# Patient Record
Sex: MALE | Race: WHITE | NOT HISPANIC OR LATINO | Employment: OTHER | URBAN - METROPOLITAN AREA
[De-identification: names, ages, dates, MRNs, and addresses within clinical notes are randomized per-mention and may not be internally consistent; named-entity substitution may affect disease eponyms.]

---

## 2017-06-11 ENCOUNTER — APPOINTMENT (OUTPATIENT)
Dept: URGENT CARE | Facility: CLINIC | Age: 57
End: 2017-06-11
Payer: COMMERCIAL

## 2017-06-11 ENCOUNTER — TRANSCRIBE ORDERS (OUTPATIENT)
Dept: URGENT CARE | Facility: CLINIC | Age: 57
End: 2017-06-11

## 2017-06-11 ENCOUNTER — OFFICE VISIT (OUTPATIENT)
Dept: URGENT CARE | Facility: CLINIC | Age: 57
End: 2017-06-11
Attending: EMERGENCY MEDICINE
Payer: COMMERCIAL

## 2017-06-11 ENCOUNTER — OFFICE VISIT (OUTPATIENT)
Dept: URGENT CARE | Facility: CLINIC | Age: 57
End: 2017-06-11
Payer: COMMERCIAL

## 2017-06-11 DIAGNOSIS — R07.9 CHEST PAIN, UNSPECIFIED: Primary | ICD-10-CM

## 2017-06-11 DIAGNOSIS — R07.9 CHEST PAIN, UNSPECIFIED: ICD-10-CM

## 2017-06-11 LAB — GLUCOSE SERPL-MCNC: 87 MG/DL (ref 65–140)

## 2017-06-11 PROCEDURE — 82948 REAGENT STRIP/BLOOD GLUCOSE: CPT

## 2017-06-11 PROCEDURE — 93005 ELECTROCARDIOGRAM TRACING: CPT

## 2017-06-11 PROCEDURE — 99213 OFFICE O/P EST LOW 20 MIN: CPT

## 2017-06-12 LAB
ATRIAL RATE: 166 BPM
QRS AXIS: 19 DEGREES
QRSD INTERVAL: 94 MS
QT INTERVAL: 294 MS
QTC INTERVAL: 465 MS
T WAVE AXIS: 75 DEGREES
VENTRICULAR RATE: 151 BPM

## 2017-11-12 ENCOUNTER — OFFICE VISIT (OUTPATIENT)
Dept: URGENT CARE | Facility: CLINIC | Age: 57
End: 2017-11-12
Payer: COMMERCIAL

## 2017-11-12 PROCEDURE — 99213 OFFICE O/P EST LOW 20 MIN: CPT
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
## 2018-09-10 PROBLEM — E78.5 DYSLIPIDEMIA: Status: ACTIVE | Noted: 2018-09-10

## 2018-09-10 PROBLEM — G47.30 SLEEP APNEA: Status: ACTIVE | Noted: 2018-09-10

## 2018-09-10 PROBLEM — E78.2 MIXED HYPERLIPIDEMIA: Status: ACTIVE | Noted: 2018-09-10

## 2018-09-10 PROBLEM — I25.10 CORONARY ARTERIOSCLEROSIS IN NATIVE ARTERY: Status: ACTIVE | Noted: 2018-09-10

## 2018-09-10 PROBLEM — I48.91 ATRIAL FIBRILLATION (HCC): Status: ACTIVE | Noted: 2018-09-10

## 2018-09-10 PROBLEM — I10 ESSENTIAL (PRIMARY) HYPERTENSION: Status: ACTIVE | Noted: 2018-09-10

## 2018-09-10 PROBLEM — F32.A DEPRESSION: Status: ACTIVE | Noted: 2018-09-10

## 2018-09-10 PROBLEM — F41.9 ANXIETY: Status: ACTIVE | Noted: 2018-09-10

## 2018-09-10 PROBLEM — E66.9 OBESITY: Status: ACTIVE | Noted: 2018-09-10

## 2018-09-10 PROBLEM — R00.2 PALPITATIONS: Status: ACTIVE | Noted: 2018-09-10

## 2018-09-10 RX ORDER — FLUOXETINE HYDROCHLORIDE 20 MG/1
40 CAPSULE ORAL DAILY
COMMUNITY

## 2018-09-10 RX ORDER — ALPRAZOLAM 1 MG/1
1 TABLET ORAL
COMMUNITY

## 2018-09-10 RX ORDER — ASPIRIN 325 MG
325 TABLET ORAL DAILY
COMMUNITY
End: 2019-10-08 | Stop reason: HOSPADM

## 2018-09-10 RX ORDER — BUSPIRONE HYDROCHLORIDE 5 MG/1
TABLET ORAL
COMMUNITY
End: 2018-10-08

## 2018-10-08 ENCOUNTER — OFFICE VISIT (OUTPATIENT)
Dept: CARDIOLOGY CLINIC | Facility: CLINIC | Age: 58
End: 2018-10-08
Payer: COMMERCIAL

## 2018-10-08 VITALS
DIASTOLIC BLOOD PRESSURE: 76 MMHG | WEIGHT: 315 LBS | HEART RATE: 68 BPM | HEIGHT: 68 IN | BODY MASS INDEX: 47.74 KG/M2 | SYSTOLIC BLOOD PRESSURE: 130 MMHG

## 2018-10-08 DIAGNOSIS — I10 ESSENTIAL (PRIMARY) HYPERTENSION: ICD-10-CM

## 2018-10-08 DIAGNOSIS — I48.0 PAROXYSMAL ATRIAL FIBRILLATION (HCC): Primary | ICD-10-CM

## 2018-10-08 PROCEDURE — 99214 OFFICE O/P EST MOD 30 MIN: CPT | Performed by: INTERNAL MEDICINE

## 2018-10-08 RX ORDER — METOPROLOL SUCCINATE 50 MG/1
50 TABLET, EXTENDED RELEASE ORAL DAILY
Qty: 30 TABLET | Refills: 5
Start: 2018-10-08 | End: 2018-12-10 | Stop reason: SDUPTHER

## 2018-10-08 NOTE — PROGRESS NOTES
Subjective:        Patient ID: Antonette Mendoza is a 62 y o  male  Chief Complaint:  Michelle Harmon offers no specific cardiac complaints today  No unusual dyspnea with exertion no change in his mild lower extremity edema which is worse at night better by mornings, no claudication like symptoms, no chest pains with or without exertion  Sounds of like he is having some right-sided sciatica, told to bring this to your attention  Spent a lot of time reviewing his records  He has no reminiscent AFib symptoms post ablation  He has no bradycardic symptoms  He is on CPAP  He remains on aspirin therapy  He is on low-dose long-acting beta-blocker therapy  For the record, despite with the after visit summary says I did not tell him to stop any medications today whatsoever, I simply deleted medications he was not taking  Epic states then this is me telling him to stop meds  This is not the case  The following portions of the patient's history were reviewed and updated as appropriate: allergies, current medications, past family history, past medical history, past social history, past surgical history and problem list   Review of Systems   Constitution: Negative for chills, diaphoresis, malaise/fatigue and weight gain  HENT: Negative for nosebleeds and stridor  Eyes: Negative for double vision, vision loss in left eye, vision loss in right eye and visual disturbance  Cardiovascular: Positive for dyspnea on exertion (Mild nonprogressive) and leg swelling ( chronic non progressive)  Negative for chest pain, claudication, cyanosis, irregular heartbeat, near-syncope, orthopnea, palpitations, paroxysmal nocturnal dyspnea and syncope  Respiratory: Negative for cough, shortness of breath, snoring and wheezing  Endocrine: Negative for polydipsia, polyphagia and polyuria  Hematologic/Lymphatic: Negative for bleeding problem  Does not bruise/bleed easily  Skin: Negative for flushing and rash     Musculoskeletal: Negative for falls and myalgias  Gastrointestinal: Negative for abdominal pain, heartburn, hematemesis, hematochezia, melena and nausea  Genitourinary: Negative for hematuria  Neurological: Negative for brief paralysis, dizziness, focal weakness, headaches, light-headedness, loss of balance and vertigo  Psychiatric/Behavioral: Negative for altered mental status and substance abuse  Allergic/Immunologic: Negative for hives  Objective:      /76   Pulse 68   Ht 5' 8" (1 727 m)   Wt (!) 153 kg (338 lb)   BMI 51 39 kg/m²   Physical Exam   Constitutional: He is oriented to person, place, and time  He appears well-developed and well-nourished  No distress  HENT:   Head: Normocephalic and atraumatic  Eyes: Pupils are equal, round, and reactive to light  EOM are normal  No scleral icterus  Neck: Normal range of motion  Neck supple  No JVD present  No thyromegaly present  Cardiovascular: Normal rate, regular rhythm and normal heart sounds  Exam reveals no gallop and no friction rub  No murmur heard  Pulmonary/Chest: Effort normal and breath sounds normal  No stridor  No respiratory distress  He has no wheezes  He has no rales  Abdominal: Soft  Bowel sounds are normal  He exhibits no distension and no mass  There is no tenderness  Musculoskeletal: Normal range of motion  He exhibits edema (Mild bilateral lower extremity below-knee)  He exhibits no deformity  Neurological: He is alert and oriented to person, place, and time  Coordination normal    Skin: Skin is warm and dry  No erythema  No pallor  Psychiatric: He has a normal mood and affect  His behavior is normal        Lab Review:   No visits with results within 2 Month(s) from this visit     Latest known visit with results is:   Office Visit on 06/11/2017   Component Date Value    Ventricular Rate 06/11/2017 151     Atrial Rate 06/11/2017 166     QRSD Interval 06/11/2017 94     QT Interval 06/11/2017 294     QTC Interval 06/11/2017 465     QRS Axis 06/11/2017 19     T Wave Axis 06/11/2017 75      No results found  Assessment:       1  Paroxysmal atrial fibrillation (HCC)  metoprolol succinate (TOPROL-XL) 50 mg 24 hr tablet   2  Essential (primary) hypertension          Plan:       Alcides Plascencia is without signs or symptoms reminiscent of angina, heart failure, nor electrical instability/recurrent AFib  He remains on aspirin for stroke prevention in light of previously deemed low stroke risk score  Blood pressure is well controlled  I advise no changes today and no surveillance testing  I will see him back in 6 months, sooner as needed

## 2018-10-08 NOTE — PATIENT INSTRUCTIONS

## 2018-12-10 DIAGNOSIS — I48.0 PAROXYSMAL ATRIAL FIBRILLATION (HCC): ICD-10-CM

## 2018-12-10 RX ORDER — METOPROLOL SUCCINATE 50 MG/1
50 TABLET, EXTENDED RELEASE ORAL DAILY
Qty: 90 TABLET | Refills: 3 | Status: SHIPPED | OUTPATIENT
Start: 2018-12-10 | End: 2018-12-10

## 2018-12-10 RX ORDER — METOPROLOL SUCCINATE 50 MG/1
TABLET, EXTENDED RELEASE ORAL
Qty: 30 TABLET | Refills: 0 | Status: SHIPPED | OUTPATIENT
Start: 2018-12-10 | End: 2018-12-18 | Stop reason: SDUPTHER

## 2018-12-18 ENCOUNTER — CONSULT (OUTPATIENT)
Dept: VASCULAR SURGERY | Facility: CLINIC | Age: 58
End: 2018-12-18
Payer: COMMERCIAL

## 2018-12-18 VITALS
SYSTOLIC BLOOD PRESSURE: 100 MMHG | HEART RATE: 60 BPM | DIASTOLIC BLOOD PRESSURE: 60 MMHG | WEIGHT: 315 LBS | BODY MASS INDEX: 47.74 KG/M2 | HEIGHT: 68 IN | TEMPERATURE: 95.8 F

## 2018-12-18 DIAGNOSIS — I87.2 CHRONIC VENOUS INSUFFICIENCY: Primary | ICD-10-CM

## 2018-12-18 DIAGNOSIS — I48.0 PAROXYSMAL ATRIAL FIBRILLATION (HCC): ICD-10-CM

## 2018-12-18 PROCEDURE — 99243 OFF/OP CNSLTJ NEW/EST LOW 30: CPT | Performed by: SURGERY

## 2018-12-18 RX ORDER — BUSPIRONE HYDROCHLORIDE 5 MG/1
TABLET ORAL
COMMUNITY

## 2018-12-18 RX ORDER — METOPROLOL SUCCINATE 50 MG/1
50 TABLET, EXTENDED RELEASE ORAL DAILY
Qty: 30 TABLET | Refills: 5 | Status: SHIPPED | OUTPATIENT
Start: 2018-12-18 | End: 2019-06-28 | Stop reason: SDUPTHER

## 2018-12-18 NOTE — PATIENT INSTRUCTIONS
Patient presents with chronic venous insufficiency hyperpigmentation changes left lower extremity  Also has symptomatic varicosities left greater than right with heaviness and aching after long day standing  Plan:  Obtain reflux study for further evaluation and return to the office for recommendations

## 2018-12-18 NOTE — PROGRESS NOTES
Assessment/Plan:    Chronic venous insufficiency  Patient presents with chronic venous insufficiency hyperpigmentation changes left lower extremity  Also has symptomatic varicosities left greater than right with heaviness and aching after long day standing  Plan:  Obtain reflux study for further evaluation and return to the office for recommendations  Diagnoses and all orders for this visit:    Chronic venous insufficiency  -     VAS reflux lower limb venous duplex study with reflux assessment, complete bilateral; Future    Other orders  -     busPIRone (BUSPAR) 5 mg tablet; take 2 tablets in the morning and 1 tablet at night        Subjective:      Patient ID: Baljit Bryan is a 62 y o  male  HPI heaviness and aching after long day standing, hyperpigmentation changes along the left medial malleolar region indicative of chronic venous insufficiency as well as varicosities proximal to this area  The following portions of the patient's history were reviewed and updated as appropriate: allergies, current medications, past family history, past medical history, past social history, past surgical history and problem list     Review of Systems  bilateral heaviness and aching with venous insufficiency, all other systems negative    Objective:      /60 (BP Location: Left arm, Patient Position: Sitting)   Pulse 60   Temp (!) 95 8 °F (35 4 °C)   Ht 5' 8" (1 727 m)   Wt (!) 156 kg (344 lb)   BMI 52 31 kg/m²          Physical Exam        Oriented x3 no evidence of clinical depression  Neck is supple carotid pulses equal bilaterally no bruits heard    Chest lungs clear, heart regular rhythm  Abdomen soft nontender no evidence of pulsatile masses  Pulses are palpable bilateral femoral popliteal and dorsalis pedis pulses bilaterally  Hyperpigmentation changes seen in the left medial malleolar region upon standing large varicosities are seen clustering above that area    Bilateral modest swelling  Paucity of other varicosity seen bilaterally  Neurological exam intact cranial nerves 2-12 grossly intact no gross motor sensory deficits detected    Vitals:    12/18/18 1011   BP: 100/60   BP Location: Left arm   Patient Position: Sitting   Pulse: 60   Temp: (!) 95 8 °F (35 4 °C)   Weight: (!) 156 kg (344 lb)   Height: 5' 8" (1 727 m)       Patient Active Problem List   Diagnosis    Osteoarthritis of knees, bilateral    Coronary arteriosclerosis in native artery    Sleep apnea    Obesity    Mixed hyperlipidemia    Atrial fibrillation (HCC)    Palpitations    Anxiety    Depression    Essential (primary) hypertension    Dyslipidemia    Chronic venous insufficiency       Past Surgical History:   Procedure Laterality Date    ABLATION RADIO FREQUENCY ATRIAL (MAZE/CRYOABLATION)  02/08/2018    Cryoablation lesion summary, 10 applications were delivered   CARDIAC CATHETERIZATION  11/2012    No sig CAD  6/26/17    COLONOSCOPY      [place] TOTAL KNEE ARTHROPLASTY Bilateral 6/1/2016    Procedure: ARTHROPLASTY KNEE TOTAL BILATERAL;  Surgeon: Lucia Bobby MD;  Location: [facility];  Service: Orthopedics       Family History   Problem Relation Age of Onset    Coronary artery disease Family         less than 61 yrs of age   [facility] Heart attack Mother         MI    Heart attack Sister         MI    Heart attack Brother         MI       Social History     Social History    Marital status: Unknown     Spouse name: N/A    Number of children: N/A    Years of education: N/A     Occupational History    Not on file       Social History Main Topics    Smoking status: Former Smoker     Packs/day: 1 00     Years: 18 00     Types: Cigarettes    Smokeless tobacco: Never Used      Comment: Quit 21 years ago    Alcohol use Yes    Drug use: No    Sexual activity: Not on file     Other Topics Concern    Not on file     Social History Narrative    No narrative on file       Allergies   Allergen Reactions    Benadryl [Diphenhydramine] Other (See Comments)     Tachycardia         Current Outpatient Prescriptions:     ALPRAZolam (XANAX) 1 mg tablet, Take 1 mg by mouth daily at bedtime, Disp: , Rfl:     aspirin 325 mg tablet, Take 325 mg by mouth daily, Disp: , Rfl:     busPIRone (BUSPAR) 5 mg tablet, take 2 tablets in the morning and 1 tablet at night, Disp: , Rfl:     FLUoxetine (PROzac) 20 mg capsule, Take 40 mg by mouth daily In the morning, Disp: , Rfl:     metoprolol succinate (TOPROL-XL) 50 mg 24 hr tablet, TAKE ONE TABLET DAILY   , Disp: 30 tablet, Rfl: 0    zolpidem (AMBIEN) 5 mg tablet, Take 5 mg by mouth daily at bedtime as needed for sleep , Disp: , Rfl:     escitalopram (LEXAPRO) 10 mg tablet, Take 10 mg by mouth daily at bedtime  , Disp: , Rfl:     LORazepam (ATIVAN) 0 5 mg tablet, Take 0 5 mg by mouth as needed for anxiety  , Disp: , Rfl:

## 2018-12-31 ENCOUNTER — HOSPITAL ENCOUNTER (OUTPATIENT)
Dept: ULTRASOUND IMAGING | Facility: HOSPITAL | Age: 58
Discharge: HOME | End: 2018-12-31
Attending: SURGERY
Payer: COMMERCIAL

## 2018-12-31 DIAGNOSIS — I87.2 CHRONIC VENOUS INSUFFICIENCY: ICD-10-CM

## 2018-12-31 PROCEDURE — 93970 EXTREMITY STUDY: CPT

## 2019-01-01 PROCEDURE — 93970 EXTREMITY STUDY: CPT | Performed by: SURGERY

## 2019-01-17 ENCOUNTER — OFFICE VISIT (OUTPATIENT)
Dept: URGENT CARE | Facility: CLINIC | Age: 59
End: 2019-01-17
Payer: COMMERCIAL

## 2019-01-17 VITALS
DIASTOLIC BLOOD PRESSURE: 72 MMHG | HEIGHT: 68 IN | HEART RATE: 74 BPM | BODY MASS INDEX: 47.74 KG/M2 | TEMPERATURE: 98 F | WEIGHT: 315 LBS | OXYGEN SATURATION: 97 % | RESPIRATION RATE: 32 BRPM | SYSTOLIC BLOOD PRESSURE: 140 MMHG

## 2019-01-17 DIAGNOSIS — R05.9 COUGH: Primary | ICD-10-CM

## 2019-01-17 PROCEDURE — 99213 OFFICE O/P EST LOW 20 MIN: CPT | Performed by: PHYSICIAN ASSISTANT

## 2019-01-17 RX ORDER — ALBUTEROL SULFATE 90 UG/1
2 AEROSOL, METERED RESPIRATORY (INHALATION) EVERY 6 HOURS PRN
Qty: 18 G | Refills: 0 | Status: SHIPPED | OUTPATIENT
Start: 2019-01-17 | End: 2020-02-10

## 2019-01-17 RX ORDER — BENZONATATE 100 MG/1
100 CAPSULE ORAL 3 TIMES DAILY PRN
Qty: 30 CAPSULE | Refills: 0 | Status: SHIPPED | OUTPATIENT
Start: 2019-01-17 | End: 2019-02-21 | Stop reason: ALTCHOICE

## 2019-01-17 RX ORDER — AZITHROMYCIN 250 MG/1
TABLET, FILM COATED ORAL
Qty: 6 TABLET | Refills: 0 | Status: SHIPPED | OUTPATIENT
Start: 2019-01-17 | End: 2019-01-21

## 2019-01-17 NOTE — PROGRESS NOTES
[facility]        NAME: Millie Skelton is a 62 y o  male  : 1960    MRN: [id number]  DATE: 2019  TIME: 1:34 PM    Assessment and Plan   Cough [R05]  1  Cough  azithromycin (ZITHROMAX Z-CESARIO) 250 mg tablet    benzonatate (TESSALON PERLES) 100 mg capsule    albuterol (VENTOLIN HFA) 90 mcg/act inhaler         Patient Instructions   Due to cough x2+ weeks with worsening of sx's, recommend abx treatment at this time and pt agrees  Will rx azithromycin  Tesselon perles and ventolin inhaler for symptomatic relief of cough/dyspnea  Increase fluids and rest     Follow up with PCP in 3-5 days  Proceed to  ER if symptoms worsen  Chief Complaint     Chief Complaint   Patient presents with    Cold Like Symptoms     C/O harsh cough ,sinus pressure, headache,post nasal drip, tight in the chest and SOB for more than 2 weeks  Pt thought symptoms were improving but worsened yesterday  Pt did not have the flu vaccine  History of Present Illness       Pt presents for eval of cough onset 2-3 weeks ago with associated dyspnea, chest tightness, sinus pain/pressure, PND  Pt states sx's were improving but then worsened yesterday  Has tried taking delsym with mild improvement  Pt notes his wife is sick at home with similar sx's  Review of Systems   Review of Systems   Constitutional: Negative for chills, fatigue and fever  HENT: Positive for congestion, postnasal drip and sinus pressure  Negative for ear pain, rhinorrhea and sore throat  Eyes: Negative for discharge and redness  Respiratory: Positive for cough and shortness of breath  Negative for wheezing  Cardiovascular: Negative for chest pain and palpitations  Gastrointestinal: Negative for abdominal pain, diarrhea, nausea and vomiting  Musculoskeletal: Negative for myalgias  Skin: Negative for rash  Neurological: Negative for dizziness and headaches           Current Medications       Current Outpatient Prescriptions:    ALPRAZolam (XANAX) 1 mg tablet, Take 1 mg by mouth daily at bedtime, Disp: , Rfl:     aspirin 325 mg tablet, Take 325 mg by mouth daily, Disp: , Rfl:     busPIRone (BUSPAR) 5 mg tablet, take 2 tablets in the morning and 1 tablet at night, Disp: , Rfl:     FLUoxetine (PROzac) 20 mg capsule, Take 40 mg by mouth daily In the morning, Disp: , Rfl:     metoprolol succinate (TOPROL-XL) 50 mg 24 hr tablet, Take 1 tablet (50 mg total) by mouth daily, Disp: 30 tablet, Rfl: 5    zolpidem (AMBIEN) 5 mg tablet, Take 5 mg by mouth daily at bedtime as needed for sleep , Disp: , Rfl:     albuterol (VENTOLIN HFA) 90 mcg/act inhaler, Inhale 2 puffs every 6 (six) hours as needed for wheezing, Disp: 18 g, Rfl: 0    azithromycin (ZITHROMAX Z-CESARIO) 250 mg tablet, Take 2 tabs PO on the first day, then 1 tab PO daily thereafter, Disp: 6 tablet, Rfl: 0    benzonatate (TESSALON PERLES) 100 mg capsule, Take 1 capsule (100 mg total) by mouth 3 (three) times a day as needed for cough, Disp: 30 capsule, Rfl: 0    Current Allergies     Allergies as of 01/17/2019 - Reviewed 01/17/2019   Allergen Reaction Noted    Benadryl [diphenhydramine] Other (See Comments) 05/03/2016            The following portions of the patient's history were reviewed and updated as appropriate: allergies, current medications, past family history, past medical history, past social history, past surgical history and problem list      Past Medical History:   Diagnosis Date    Anxiety     Arthritis     Athscl heart disease of native coronary artery w/o ang pctrs     Atrial fibrillation ([address] ) 2012 and 2016    CPAP (continuous positive airway pressure) dependence     Diverticulitis     Edema     Legs and hands    Essential tremor     Hard of hearing     Hx of cardiovascular stress test 04/28/2016    EF0 50 (50%) normal LVSF  No evidence for prior ischemia or MI    Hx of echocardiogram 05/18/2016    EF0 55 (55%) severely technically limited suboptimal study  No significant valvular insufficiency or stenosis   / EF0 60 (60%) Trace MR and TR 6/22/17  / EF0 65 (65%) technically difficult study  no hemodynamically significant valve disease identified  11/30/17      Hypercholesteremia     Hypertension     Mixed hyperlipidemia     Morbid obesity ([address] )     Obesity     Panic attacks     Rash     On extremeties and chest  Chronic, recurrent    Sleep apnea     Varicose veins of left lower extremity     Bilateral       Past Surgical History:   Procedure Laterality Date    ABLATION RADIO FREQUENCY ATRIAL (MAZE/CRYOABLATION)  02/08/2018    Cryoablation lesion summary, 10 applications were delivered   CARDIAC CATHETERIZATION  11/2012    No sig CAD  6/26/17    COLONOSCOPY      [place] TOTAL KNEE ARTHROPLASTY Bilateral 6/1/2016    Procedure: ARTHROPLASTY KNEE TOTAL BILATERAL;  Surgeon: Karrie Beebe MD;  Location: [facility];  Service: Orthopedics       Family History   Problem Relation Age of Onset    Coronary artery disease Family         less than 61 yrs of age   [facility] Heart attack Mother         MI    Heart attack Sister         MI    Heart attack Brother         MI         Medications have been verified  Objective   /72   Pulse 74   Temp 98 °F (36 7 °C) (Tympanic)   Resp (!) 32   Ht 5' 8" (1 727 m)   Wt (!) 156 kg (344 lb)   SpO2 97%   BMI 52 31 kg/m²        Physical Exam     Physical Exam   Constitutional: He is oriented to person, place, and time  He appears well-developed and well-nourished  HENT:   Head: Normocephalic  Right Ear: Tympanic membrane, external ear and ear canal normal    Left Ear: Tympanic membrane, external ear and ear canal normal    Nose: Nose normal    Mouth/Throat: Uvula is midline, oropharynx is clear and moist and mucous membranes are normal    Eyes: Pupils are equal, round, and reactive to light  Conjunctivae and EOM are normal    Cardiovascular: Normal rate, regular rhythm and normal heart sounds      Pulmonary/Chest: Effort normal and breath sounds normal    Lymphadenopathy:     He has no cervical adenopathy  Neurological: He is alert and oriented to person, place, and time  Skin: Skin is warm and dry  No rash noted  Psychiatric: He has a normal mood and affect  Nursing note and vitals reviewed

## 2019-01-21 ENCOUNTER — OFFICE VISIT (OUTPATIENT)
Dept: URGENT CARE | Facility: CLINIC | Age: 59
End: 2019-01-21
Payer: COMMERCIAL

## 2019-01-21 VITALS
WEIGHT: 315 LBS | TEMPERATURE: 98.7 F | BODY MASS INDEX: 47.74 KG/M2 | OXYGEN SATURATION: 97 % | RESPIRATION RATE: 24 BRPM | HEIGHT: 68 IN | HEART RATE: 88 BPM | DIASTOLIC BLOOD PRESSURE: 80 MMHG | SYSTOLIC BLOOD PRESSURE: 126 MMHG

## 2019-01-21 DIAGNOSIS — J20.8 ACUTE BACTERIAL BRONCHITIS: Primary | ICD-10-CM

## 2019-01-21 DIAGNOSIS — B96.89 ACUTE BACTERIAL BRONCHITIS: Primary | ICD-10-CM

## 2019-01-21 PROCEDURE — 99213 OFFICE O/P EST LOW 20 MIN: CPT | Performed by: FAMILY MEDICINE

## 2019-01-21 RX ORDER — GUAIFENESIN AND CODEINE PHOSPHATE 100; 10 MG/5ML; MG/5ML
5 SOLUTION ORAL 3 TIMES DAILY PRN
Qty: 120 ML | Refills: 0 | Status: SHIPPED | OUTPATIENT
Start: 2019-01-21 | End: 2019-02-21 | Stop reason: ALTCHOICE

## 2019-01-21 RX ORDER — AMOXICILLIN AND CLAVULANATE POTASSIUM 875; 125 MG/1; MG/1
1 TABLET, FILM COATED ORAL EVERY 12 HOURS SCHEDULED
COMMUNITY
End: 2019-02-21 | Stop reason: ALTCHOICE

## 2019-01-21 NOTE — PATIENT INSTRUCTIONS
Plain Mucinex, plain Robitussin, or plain guaifenesin for congestion and cough during the day  Prescription cough medicine with codeine at bedtime  You may use the a m  your is often as every 3 or 4 hr  If worsening of symptoms, recommended evaluation in the emergency room  Follow up with PCP in 3-5 days  Proceed to  ER if symptoms worsen  Acute Bronchitis   AMBULATORY CARE:   Acute bronchitis  is swelling and irritation in the air passages of your lungs  This irritation may cause you to cough or have other breathing problems  Acute bronchitis often starts because of another illness, such as a cold or the flu  The illness spreads from your nose and throat to your windpipe and airways  Bronchitis is often called a chest cold  Acute bronchitis lasts about 3 to 6 weeks and is usually not a serious illness  Your cough can last for several weeks  You may have any of the following symptoms:   · A cough with sputum that may be clear, yellow, or green    · Feeling more tired than usual, and body aches    · A fever and chills    · Wheezing when you breathe    · A tight chest or pain when you breathe or cough  Seek care immediately if:   · You cough up blood  · Your lips or fingernails turn blue  · You feel like you are not getting enough air when you breathe  Contact your healthcare provider if:   · You have a fever  · Your breathing problems do not go away or get worse  · Your cough does not get better within 4 weeks  · You have questions or concerns about your condition or care  Self-care:   · Get more rest   Rest helps your body to heal  Slowly start to do more each day  Rest when you feel it is needed  · Avoid irritants in the air  Avoid chemicals, fumes, and dust  Wear a face mask if you must work around dust or fumes  Stay inside on days when air pollution levels are high  If you have allergies, stay inside when pollen counts are high   Do not use aerosol products, such as spray-on deodorant, bug spray, and hair spray  · Do not smoke or be around others who smoke  Nicotine and other chemicals in cigarettes and cigars damages the cilia that move mucus out of your lungs  Ask your healthcare provider for information if you currently smoke and need help to quit  E-cigarettes or smokeless tobacco still contain nicotine  Talk to your healthcare provider before you use these products  · Drink liquids as directed  Liquids help keep your air passages moist and help you cough up mucus  You may need to drink more liquids when you have acute bronchitis  Ask how much liquid to drink each day and which liquids are best for you  · Use a humidifier or vaporizer  Use a cool mist humidifier or a vaporizer to increase air moisture in your home  This may make it easier for you to breathe and help decrease your cough  Prevent acute bronchitis by doing the following:   · Get the vaccinations you need  Ask your healthcare provider if you should get vaccinated against the flu or pneumonia  · Prevent the spread of germs  You can decrease your risk of acute bronchitis and other illnesses by doing the following:     [facility] your hands often with soap and water  Carry germ-killing hand lotion or gel with you  You can use the lotion or gel to clean your hands when soap and water are not available  ¨ Do not touch your eyes, nose, or mouth unless you have washed your hands first     ¨ Always cover your mouth when you cough to prevent the spread of germs  It is best to cough into a tissue or your shirt sleeve instead of into your hand  Ask those around you cover their mouths when they cough  ¨ Try to avoid people who have a cold or the flu  If you are sick, stay away from others as much as possible  Medicines: Your healthcare provider may  give you any of the following:  · Ibuprofen or acetaminophen  are medicines that help lower your fever  They are available without a doctor's order   Ask your healthcare provider which medicine is right for you  Ask how much to take and how often to take it  Follow directions  These medicines can cause stomach bleeding if not taken correctly  Ibuprofen can cause kidney damage  Do not take ibuprofen if you have kidney disease, an ulcer, or allergies to aspirin  Acetaminophen can cause liver damage  Do not take more than 4,000 milligrams in 24 hours  · Decongestants  help loosen mucus in your lungs and make it easier to cough up  This can help you breathe easier  · Cough suppressants  decrease your urge to cough  If your cough produces mucus, do not take a cough suppressant unless your healthcare provider tells you to  Your healthcare provider may suggest that you take a cough suppressant at night so you can rest     · Inhalers  may be given  Your healthcare provider may give you one or more inhalers to help you breathe easier and cough less  An inhaler gives your medicine to open your airways  Ask your healthcare provider to show you how to use your inhaler correctly  Follow up with your healthcare provider as directed:  Write down questions you have so you will remember to ask them during your follow-up visits  © 2017 [facility] Information is for End User's use only and may not be sold, redistributed or otherwise used for commercial purposes  All illustrations and images included in CareNotes® are the copyrighted property of A D [employer] M [employer]  or Steffen Bowden  The above information is an  only  It is not intended as medical advice for individual conditions or treatments  Talk to your doctor, nurse or pharmacist before following any medical regimen to see if it is safe and effective for you

## 2019-01-21 NOTE — PROGRESS NOTES
[facility]        NAME: Brittaney Wyman is a 62 y o  male  : 1960    MRN: [id number]  DATE: 2019  TIME: 1:41 PM    Assessment and Plan   Acute bacterial bronchitis [J20 8, B96 89]  1  Acute bacterial bronchitis  guaifenesin-codeine (GUAIFENESIN AC) 100-10 MG/5ML liquid     Patient could benefit from prednisone, but refused  Patient had a bad interaction with prednisone in the past that exacerbated atrial fibrillation  Patient has had a cardiac ablation, but still prefers not to try the prednisone again  Patient Instructions     Plain Mucinex, plain Robitussin, or plain guaifenesin for congestion and cough during the day  Prescription cough medicine with codeine at bedtime  You may use the a m  your is often as every 3 or 4 hr  If worsening of symptoms, recommended evaluation in the emergency room  Follow up with PCP in 3-5 days  Proceed to  ER if symptoms worsen  Chief Complaint     Chief Complaint   Patient presents with    Cough     Cough, congestion, wheezing and chills started last week  History of Present Illness       Cough (Cough, congestion, wheezing and chills started last week  )  Patient is taking the azithromycin, Tessalon Perles, and using the inhaler twice a day  Still with a mucousy cough that is keeping him awake at night  Cough   This is a recurrent problem  The current episode started 1 to 4 weeks ago  The problem has been gradually improving  The problem occurs constantly  The cough is productive of sputum  Associated symptoms include chills and wheezing  He has tried a beta-agonist inhaler and OTC cough suppressant for the symptoms  Review of Systems   Review of Systems   Constitutional: Positive for chills and fatigue  HENT: Positive for congestion  Respiratory: Positive for cough and wheezing  Cardiovascular: Negative            Current Medications       Current Outpatient Prescriptions:     albuterol (VENTOLIN HFA) 90 mcg/act inhaler, Inhale 2 puffs every 6 (six) hours as needed for wheezing, Disp: 18 g, Rfl: 0    ALPRAZolam (XANAX) 1 mg tablet, Take 1 mg by mouth daily at bedtime, Disp: , Rfl:     amoxicillin-clavulanate (AUGMENTIN) 875-125 mg per tablet, Take 1 tablet by mouth every 12 (twelve) hours, Disp: , Rfl:     aspirin 325 mg tablet, Take 325 mg by mouth daily, Disp: , Rfl:     benzonatate (TESSALON PERLES) 100 mg capsule, Take 1 capsule (100 mg total) by mouth 3 (three) times a day as needed for cough, Disp: 30 capsule, Rfl: 0    busPIRone (BUSPAR) 5 mg tablet, take 2 tablets in the morning and 1 tablet at night, Disp: , Rfl:     FLUoxetine (PROzac) 20 mg capsule, Take 40 mg by mouth daily In the morning, Disp: , Rfl:     metoprolol succinate (TOPROL-XL) 50 mg 24 hr tablet, Take 1 tablet (50 mg total) by mouth daily, Disp: 30 tablet, Rfl: 5    zolpidem (AMBIEN) 5 mg tablet, Take 5 mg by mouth daily at bedtime as needed for sleep , Disp: , Rfl:     azithromycin (ZITHROMAX Z-CESARIO) 250 mg tablet, Take 2 tabs PO on the first day, then 1 tab PO daily thereafter (Patient not taking: Reported on 1/21/2019 ), Disp: 6 tablet, Rfl: 0    guaifenesin-codeine (GUAIFENESIN AC) 100-10 MG/5ML liquid, Take 5 mL by mouth 3 (three) times a day as needed for cough, Disp: 120 mL, Rfl: 0    Current Allergies     Allergies as of 01/21/2019 - Reviewed 01/21/2019   Allergen Reaction Noted    Benadryl [diphenhydramine] Other (See Comments) 05/03/2016            The following portions of the patient's history were reviewed and updated as appropriate: allergies, current medications, past family history, past medical history, past social history, past surgical history and problem list      Past Medical History:   Diagnosis Date    Anxiety     Arthritis     Athscl heart disease of native coronary artery w/o ang pctrs     Atrial fibrillation ([facility] [address] ) 2012 and 2016    CPAP (continuous positive airway pressure) dependence     Diverticulitis     Edema     Legs and hands    Essential tremor     Hard of hearing     Hx of cardiovascular stress test 04/28/2016    EF0 50 (50%) normal LVSF  No evidence for prior ischemia or MI    Hx of echocardiogram 05/18/2016    EF0 55 (55%) severely technically limited suboptimal study  No significant valvular insufficiency or stenosis   / EF0 60 (60%) Trace MR and TR 6/22/17  / EF0 65 (65%) technically difficult study  no hemodynamically significant valve disease identified  11/30/17      Hypercholesteremia     Hypertension     Mixed hyperlipidemia     Morbid obesity ([address] )     Obesity     Panic attacks     Rash     On extremeties and chest  Chronic, recurrent    Sleep apnea     Varicose veins of left lower extremity     Bilateral       Past Surgical History:   Procedure Laterality Date    ABLATION RADIO FREQUENCY ATRIAL (MAZE/CRYOABLATION)  02/08/2018    Cryoablation lesion summary, 10 applications were delivered   CARDIAC CATHETERIZATION  11/2012    No sig CAD  6/26/17    COLONOSCOPY      MA TOTAL KNEE ARTHROPLASTY Bilateral 6/1/2016    Procedure: ARTHROPLASTY KNEE TOTAL BILATERAL;  Surgeon: Claudio Reid MD;  Location: [facility];  Service: Orthopedics       Family History   Problem Relation Age of Onset    Coronary artery disease Family         less than 61 yrs of age   [facility] Heart attack Mother         MI    Heart attack Sister         MI    Heart attack Brother         MI         Medications have been verified  Objective   /80   Pulse 88   Temp 98 7 °F (37 1 °C) (Tympanic)   Resp (!) 24   Ht 5' 8" (1 727 m)   Wt (!) 152 kg (335 lb)   SpO2 97%   BMI 50 94 kg/m²        Physical Exam     Physical Exam   Constitutional: He appears well-developed  HENT:   Right Ear: External ear normal    Left Ear: External ear normal    Nose: Nose normal    Mouth/Throat: Oropharynx is clear and moist  No oropharyngeal exudate     Eyes: Conjunctivae are normal    Neck: Normal range of motion  Neck supple  Cardiovascular: Normal rate, regular rhythm and normal heart sounds  No murmur heard  Pulmonary/Chest: Effort normal  No respiratory distress  He has wheezes  He has no rales  He exhibits no tenderness  Lymphadenopathy:     He has no cervical adenopathy

## 2019-02-21 ENCOUNTER — APPOINTMENT (EMERGENCY)
Dept: RADIOLOGY | Facility: HOSPITAL | Age: 59
End: 2019-02-21
Payer: COMMERCIAL

## 2019-02-21 ENCOUNTER — APPOINTMENT (EMERGENCY)
Dept: CT IMAGING | Facility: HOSPITAL | Age: 59
End: 2019-02-21
Payer: COMMERCIAL

## 2019-02-21 ENCOUNTER — HOSPITAL ENCOUNTER (EMERGENCY)
Facility: HOSPITAL | Age: 59
Discharge: HOME | End: 2019-02-22
Attending: EMERGENCY MEDICINE
Payer: COMMERCIAL

## 2019-02-21 DIAGNOSIS — N39.0 UTI (URINARY TRACT INFECTION): Primary | ICD-10-CM

## 2019-02-21 LAB
ALBUMIN SERPL BCP-MCNC: 4.1 G/DL (ref 3.5–5.7)
ALP SERPL-CCNC: 52 U/L (ref 40–150)
ALT SERPL W P-5'-P-CCNC: 19 U/L (ref 7–52)
ANION GAP SERPL CALCULATED.3IONS-SCNC: 6 MMOL/L (ref 4–13)
APTT PPP: 32 SECONDS (ref 26–38)
AST SERPL W P-5'-P-CCNC: 12 U/L (ref 13–39)
BACTERIA UR QL AUTO: ABNORMAL /HPF
BASOPHILS # BLD AUTO: 0.2 THOUSANDS/ΜL (ref 0–0.1)
BASOPHILS NFR BLD AUTO: 2 % (ref 0–2)
BILIRUB SERPL-MCNC: 0.8 MG/DL (ref 0.2–1)
BILIRUB UR QL STRIP: NEGATIVE
BNP SERPL-MCNC: 56 PG/ML (ref 1–100)
BUN SERPL-MCNC: 17 MG/DL (ref 7–25)
CALCIUM SERPL-MCNC: 9.6 MG/DL (ref 8.6–10.5)
CHLORIDE SERPL-SCNC: 98 MMOL/L (ref 98–107)
CLARITY UR: CLEAR
CO2 SERPL-SCNC: 27 MMOL/L (ref 21–31)
COLOR UR: YELLOW
CREAT SERPL-MCNC: 1.06 MG/DL (ref 0.7–1.3)
EOSINOPHIL # BLD AUTO: 0.2 THOUSAND/ΜL (ref 0–0.61)
EOSINOPHIL NFR BLD AUTO: 2 % (ref 0–5)
ERYTHROCYTE [DISTWIDTH] IN BLOOD BY AUTOMATED COUNT: 15 % (ref 11.5–14.5)
GFR SERPL CREATININE-BSD FRML MDRD: 77 ML/MIN/1.73SQ M
GLUCOSE SERPL-MCNC: 106 MG/DL (ref 65–99)
GLUCOSE UR STRIP-MCNC: NEGATIVE MG/DL
HCT VFR BLD AUTO: 43.8 % (ref 42–47)
HGB BLD-MCNC: 14.8 G/DL (ref 14–18)
HGB UR QL STRIP.AUTO: ABNORMAL
INR PPP: 1.18 (ref 0.9–1.5)
KETONES UR STRIP-MCNC: ABNORMAL MG/DL
LACTATE SERPL-SCNC: 1 MMOL/L (ref 0.5–2)
LEUKOCYTE ESTERASE UR QL STRIP: ABNORMAL
LYMPHOCYTES # BLD AUTO: 0.5 THOUSANDS/ΜL (ref 0.6–4.47)
LYMPHOCYTES NFR BLD AUTO: 4 % (ref 21–51)
MCH RBC QN AUTO: 28.2 PG (ref 26–34)
MCHC RBC AUTO-ENTMCNC: 33.9 G/DL (ref 31–37)
MCV RBC AUTO: 83 FL (ref 81–99)
MONOCYTES # BLD AUTO: 0.5 THOUSAND/ΜL (ref 0.17–1.22)
MONOCYTES NFR BLD AUTO: 5 % (ref 2–12)
NEUTROPHILS # BLD AUTO: 9.2 THOUSANDS/ΜL (ref 1.4–6.5)
NEUTS SEG NFR BLD AUTO: 88 % (ref 42–75)
NITRITE UR QL STRIP: NEGATIVE
NON-SQ EPI CELLS URNS QL MICRO: ABNORMAL /HPF
NRBC BLD AUTO-RTO: 0 /100 WBCS
PH UR STRIP.AUTO: 7.5 [PH] (ref 5–8)
PLATELET # BLD AUTO: 192 THOUSANDS/UL (ref 149–390)
PMV BLD AUTO: 7.3 FL (ref 8.6–11.7)
POTASSIUM SERPL-SCNC: 4 MMOL/L (ref 3.5–5.5)
PROT SERPL-MCNC: 7.3 G/DL (ref 6.4–8.9)
PROT UR STRIP-MCNC: NEGATIVE MG/DL
PROTHROMBIN TIME: 13.7 SECONDS (ref 10.2–13)
RBC # BLD AUTO: 5.27 MILLION/UL (ref 4.3–5.9)
RBC #/AREA URNS AUTO: ABNORMAL /HPF
SODIUM SERPL-SCNC: 131 MMOL/L (ref 134–143)
SP GR UR STRIP.AUTO: 1.01 (ref 1–1.03)
TROPONIN I SERPL-MCNC: <0.03 NG/ML
UROBILINOGEN UR QL STRIP.AUTO: 1 E.U./DL
WBC # BLD AUTO: 10.5 THOUSAND/UL (ref 4.8–10.8)
WBC #/AREA URNS AUTO: ABNORMAL /HPF

## 2019-02-21 PROCEDURE — 96375 TX/PRO/DX INJ NEW DRUG ADDON: CPT

## 2019-02-21 PROCEDURE — 83880 ASSAY OF NATRIURETIC PEPTIDE: CPT | Performed by: EMERGENCY MEDICINE

## 2019-02-21 PROCEDURE — 36415 COLL VENOUS BLD VENIPUNCTURE: CPT | Performed by: EMERGENCY MEDICINE

## 2019-02-21 PROCEDURE — 71046 X-RAY EXAM CHEST 2 VIEWS: CPT

## 2019-02-21 PROCEDURE — 99284 EMERGENCY DEPT VISIT MOD MDM: CPT

## 2019-02-21 PROCEDURE — 84484 ASSAY OF TROPONIN QUANT: CPT | Performed by: EMERGENCY MEDICINE

## 2019-02-21 PROCEDURE — 81003 URINALYSIS AUTO W/O SCOPE: CPT | Performed by: EMERGENCY MEDICINE

## 2019-02-21 PROCEDURE — 85730 THROMBOPLASTIN TIME PARTIAL: CPT | Performed by: EMERGENCY MEDICINE

## 2019-02-21 PROCEDURE — 74177 CT ABD & PELVIS W/CONTRAST: CPT

## 2019-02-21 PROCEDURE — 85610 PROTHROMBIN TIME: CPT | Performed by: EMERGENCY MEDICINE

## 2019-02-21 PROCEDURE — 87040 BLOOD CULTURE FOR BACTERIA: CPT | Performed by: EMERGENCY MEDICINE

## 2019-02-21 PROCEDURE — 85025 COMPLETE CBC W/AUTO DIFF WBC: CPT | Performed by: EMERGENCY MEDICINE

## 2019-02-21 PROCEDURE — 80053 COMPREHEN METABOLIC PANEL: CPT | Performed by: EMERGENCY MEDICINE

## 2019-02-21 PROCEDURE — 84145 PROCALCITONIN (PCT): CPT | Performed by: EMERGENCY MEDICINE

## 2019-02-21 PROCEDURE — 96361 HYDRATE IV INFUSION ADD-ON: CPT

## 2019-02-21 PROCEDURE — 81001 URINALYSIS AUTO W/SCOPE: CPT | Performed by: EMERGENCY MEDICINE

## 2019-02-21 PROCEDURE — 93005 ELECTROCARDIOGRAM TRACING: CPT

## 2019-02-21 PROCEDURE — 83605 ASSAY OF LACTIC ACID: CPT | Performed by: EMERGENCY MEDICINE

## 2019-02-21 PROCEDURE — 96365 THER/PROPH/DIAG IV INF INIT: CPT

## 2019-02-21 RX ORDER — KETOROLAC TROMETHAMINE 30 MG/ML
15 INJECTION, SOLUTION INTRAMUSCULAR; INTRAVENOUS ONCE
Status: COMPLETED | OUTPATIENT
Start: 2019-02-21 | End: 2019-02-21

## 2019-02-21 RX ORDER — ONDANSETRON 2 MG/ML
4 INJECTION INTRAMUSCULAR; INTRAVENOUS ONCE
Status: COMPLETED | OUTPATIENT
Start: 2019-02-21 | End: 2019-02-21

## 2019-02-21 RX ORDER — ACETAMINOPHEN 325 MG/1
975 TABLET ORAL ONCE
Status: COMPLETED | OUTPATIENT
Start: 2019-02-21 | End: 2019-02-21

## 2019-02-21 RX ORDER — CEFTRIAXONE 2 G/50ML
2000 INJECTION, SOLUTION INTRAVENOUS ONCE
Status: DISCONTINUED | OUTPATIENT
Start: 2019-02-21 | End: 2019-02-21

## 2019-02-21 RX ORDER — 0.9 % SODIUM CHLORIDE 0.9 %
3 VIAL (ML) INJECTION AS NEEDED
Status: DISCONTINUED | OUTPATIENT
Start: 2019-02-21 | End: 2019-02-22 | Stop reason: HOSPADM

## 2019-02-21 RX ORDER — MORPHINE SULFATE 10 MG/ML
6 INJECTION, SOLUTION INTRAMUSCULAR; INTRAVENOUS ONCE
Status: COMPLETED | OUTPATIENT
Start: 2019-02-21 | End: 2019-02-21

## 2019-02-21 RX ADMIN — IOHEXOL 100 ML: 350 INJECTION, SOLUTION INTRAVENOUS at 23:04

## 2019-02-21 RX ADMIN — MORPHINE SULFATE 6 MG: 10 INJECTION, SOLUTION INTRAMUSCULAR; INTRAVENOUS at 22:21

## 2019-02-21 RX ADMIN — ONDANSETRON 4 MG: 2 INJECTION INTRAMUSCULAR; INTRAVENOUS at 22:15

## 2019-02-21 RX ADMIN — KETOROLAC TROMETHAMINE 15 MG: 30 INJECTION, SOLUTION INTRAMUSCULAR; INTRAVENOUS at 23:08

## 2019-02-21 RX ADMIN — SODIUM CHLORIDE 1000 ML: 0.9 INJECTION, SOLUTION INTRAVENOUS at 22:33

## 2019-02-21 RX ADMIN — ACETAMINOPHEN 975 MG: 325 TABLET ORAL at 22:11

## 2019-02-21 RX ADMIN — SODIUM CHLORIDE 1000 ML: 0.9 INJECTION, SOLUTION INTRAVENOUS at 22:15

## 2019-02-21 RX ADMIN — PIPERACILLIN SODIUM AND TAZOBACTAM SODIUM 4.5 G: 4; .5 INJECTION, POWDER, LYOPHILIZED, FOR SOLUTION INTRAVENOUS at 23:09

## 2019-02-22 VITALS
RESPIRATION RATE: 20 BRPM | OXYGEN SATURATION: 94 % | SYSTOLIC BLOOD PRESSURE: 108 MMHG | TEMPERATURE: 99.2 F | BODY MASS INDEX: 47.74 KG/M2 | HEART RATE: 68 BPM | HEIGHT: 68 IN | DIASTOLIC BLOOD PRESSURE: 59 MMHG | WEIGHT: 315 LBS

## 2019-02-22 LAB
ATRIAL RATE: 95 BPM
P AXIS: 50 DEGREES
PR INTERVAL: 136 MS
PROCALCITONIN SERPL-MCNC: 0.07 NG/ML
QRS AXIS: 16 DEGREES
QRSD INTERVAL: 94 MS
QT INTERVAL: 356 MS
QTC INTERVAL: 447 MS
T WAVE AXIS: 48 DEGREES
VENTRICULAR RATE: 95 BPM

## 2019-02-22 PROCEDURE — 96361 HYDRATE IV INFUSION ADD-ON: CPT

## 2019-02-22 PROCEDURE — 93010 ELECTROCARDIOGRAM REPORT: CPT | Performed by: INTERNAL MEDICINE

## 2019-02-22 PROCEDURE — 96376 TX/PRO/DX INJ SAME DRUG ADON: CPT

## 2019-02-22 RX ORDER — CEPHALEXIN 500 MG/1
500 CAPSULE ORAL EVERY 12 HOURS SCHEDULED
Qty: 20 CAPSULE | Refills: 0 | Status: SHIPPED | OUTPATIENT
Start: 2019-02-22 | End: 2019-03-04

## 2019-02-22 RX ORDER — MORPHINE SULFATE 4 MG/ML
4 INJECTION, SOLUTION INTRAMUSCULAR; INTRAVENOUS ONCE
Status: COMPLETED | OUTPATIENT
Start: 2019-02-22 | End: 2019-02-22

## 2019-02-22 RX ADMIN — MORPHINE SULFATE 4 MG: 4 INJECTION INTRAVENOUS at 00:34

## 2019-02-22 RX ADMIN — SODIUM CHLORIDE 1000 ML: 0.9 INJECTION, SOLUTION INTRAVENOUS at 00:34

## 2019-02-22 NOTE — ED PROVIDER NOTES
History  Chief Complaint   Patient presents with    Back Pain     pt dev back and abd pain a few days ago, today C/O fever and chills  80-year-old male with history of AFib, diverticulitis, CAD, hypertension and hyperlipidemia presents for evaluation of abdominal and back pain x3 days  Pain is bilateral back with radiation to the lower abdomen and currently an 8/10  Patient reports no aggravating or relieving factors  Patient reports fevers and chills x1 day with T-max of 101° at home  Patient denies sick contacts, chest pain, shortness of breath, nausea or vomiting  Patient with no history of ureteral stones in the past       History provided by:  Patient   used: No    Back Pain   Associated symptoms: abdominal pain and fever    Associated symptoms: no chest pain, no dysuria and no headaches        Prior to Admission Medications   Prescriptions Last Dose Informant Patient Reported? Taking? ALPRAZolam (XANAX) 1 mg tablet 2/20/2019 at Unknown time  Yes Yes   Sig: Take 1 mg by mouth daily at bedtime   FLUoxetine (PROzac) 20 mg capsule 2/21/2019 at Unknown time  Yes Yes   Sig: Take 40 mg by mouth daily In the morning   albuterol (VENTOLIN HFA) 90 mcg/act inhaler   No No   Sig: Inhale 2 puffs every 6 (six) hours as needed for wheezing   aspirin 325 mg tablet 2/21/2019 at Unknown time  Yes Yes   Sig: Take 325 mg by mouth daily   busPIRone (BUSPAR) 5 mg tablet 2/21/2019 at Unknown time  Yes Yes   Sig: take 2 tablets in the morning and 1 tablet at night   metoprolol succinate (TOPROL-XL) 50 mg 24 hr tablet 2/21/2019 at Unknown time  No Yes   Sig: Take 1 tablet (50 mg total) by mouth daily   zolpidem (AMBIEN) 5 mg tablet 2/20/2019 at Unknown time  Yes Yes   Sig: Take 5 mg by mouth daily at bedtime as needed for sleep        Facility-Administered Medications: None       Past Medical History:   Diagnosis Date    Anxiety     Arthritis     Athscl heart disease of native coronary artery w/o ang pctrs     Atrial fibrillation ([facility] [address] ) 2012 and 2016    CPAP (continuous positive airway pressure) dependence     Diverticulitis     Edema     Legs and hands    Essential tremor     Hard of hearing     Hx of cardiovascular stress test 04/28/2016    EF0 50 (50%) normal LVSF  No evidence for prior ischemia or MI    Hx of echocardiogram 05/18/2016    EF0 55 (55%) severely technically limited suboptimal study  No significant valvular insufficiency or stenosis   / EF0 60 (60%) Trace MR and TR 6/22/17  / EF0 65 (65%) technically difficult study  no hemodynamically significant valve disease identified  11/30/17      Hypercholesteremia     Hypertension     Mixed hyperlipidemia     Morbid obesity ([facility] [address] )     Obesity     Panic attacks     Rash     On extremeties and chest  Chronic, recurrent    Sleep apnea     Varicose veins of left lower extremity     Bilateral       Past Surgical History:   Procedure Laterality Date    ABLATION RADIO FREQUENCY ATRIAL (MAZE/CRYOABLATION)  02/08/2018    Cryoablation lesion summary, 10 applications were delivered   CARDIAC CATHETERIZATION  11/2012    No sig CAD  6/26/17    COLONOSCOPY      [place] TOTAL KNEE ARTHROPLASTY Bilateral 6/1/2016    Procedure: ARTHROPLASTY KNEE TOTAL BILATERAL;  Surgeon: Miguel Myles MD;  Location: [facility];  Service: Orthopedics       Family History   Problem Relation Age of Onset    Coronary artery disease Family         less than 61 yrs of age   [facility] Heart attack Mother         MI    Heart attack Sister         MI    Heart attack Brother         MI     I have reviewed and agree with the history as documented  Social History     Tobacco Use    Smoking status: Former Smoker     Packs/day: 1 00     Years: 18 00     Pack years: 18 00     Types: Cigarettes    Smokeless tobacco: Never Used    Tobacco comment: Quit 21 years ago   Substance Use Topics    Alcohol use:  Yes    Drug use: No        Review of Systems   Constitutional: Positive for chills and fever  HENT: Negative for rhinorrhea and sore throat  Eyes: Negative for visual disturbance  Respiratory: Negative for cough and shortness of breath  Cardiovascular: Negative for chest pain and leg swelling  Gastrointestinal: Positive for abdominal pain  Negative for constipation, diarrhea, nausea and vomiting  Genitourinary: Positive for hematuria  Negative for dysuria  Musculoskeletal: Positive for back pain  Negative for myalgias  Skin: Negative for rash  Neurological: Negative for dizziness and headaches  Psychiatric/Behavioral: Negative for confusion  All other systems reviewed and are negative  Physical Exam  Physical Exam   Constitutional: He is oriented to person, place, and time  He appears well-developed and well-nourished  HENT:   Nose: Nose normal    Mouth/Throat: Oropharynx is clear and moist  No oropharyngeal exudate  Eyes: Pupils are equal, round, and reactive to light  Conjunctivae and EOM are normal  No scleral icterus  Neck: Normal range of motion  Neck supple  No JVD present  No tracheal deviation present  Cardiovascular: Normal rate, regular rhythm and normal heart sounds  No murmur heard  Pulmonary/Chest: Effort normal and breath sounds normal  No respiratory distress  He has no wheezes  He has no rales  Abdominal: Soft  Bowel sounds are normal  There is tenderness in the right lower quadrant and left lower quadrant  There is CVA tenderness (Bilaterally)  There is no guarding, no tenderness at McBurney's point and negative Marroquin's sign  Musculoskeletal: Normal range of motion  He exhibits no edema or tenderness  Neurological: He is alert and oriented to person, place, and time  No cranial nerve deficit or sensory deficit  He exhibits normal muscle tone  5/5 motor, nl sens   Skin: Skin is warm and dry  Psychiatric: He has a normal mood and affect  His behavior is normal    Nursing note and vitals reviewed        Vital Signs  ED Triage Vitals Temperature Pulse Respirations Blood Pressure SpO2   02/21/19 2125 02/21/19 2126 02/21/19 2126 02/21/19 2126 02/21/19 2126   (!) 101 6 °F (38 7 °C) 101 20 146/75 94 %      Temp Source Heart Rate Source Patient Position - Orthostatic VS BP Location FiO2 (%)   02/21/19 2125 02/21/19 2126 02/21/19 2126 02/21/19 2126 --   Temporal Monitor Lying Left arm       Pain Score       02/21/19 2126       7           Vitals:    02/21/19 2130 02/21/19 2230 02/21/19 2327 02/22/19 0000   BP: 146/75 136/73 138/66 110/57   Pulse: 101 93 86 74   Patient Position - Orthostatic VS:   Lying        Visual Acuity      ED Medications  Medications   sodium chloride (PF) 0 9 % injection 3 mL (has no administration in time range)   sodium chloride 0 9 % bolus 1,000 mL (0 mL Intravenous Stopped 2/22/19 0035)     Followed by   sodium chloride 0 9 % bolus 1,000 mL (1,000 mL Intravenous New Bag 2/21/19 2233)     Followed by   sodium chloride 0 9 % bolus 1,000 mL (1,000 mL Intravenous New Bag 2/22/19 0034)   ondansetron (ZOFRAN) injection 4 mg (4 mg Intravenous Given 2/21/19 2215)   morphine (PF) 10 mg/mL injection 6 mg (6 mg Intravenous Given 2/21/19 2221)   acetaminophen (TYLENOL) tablet 975 mg (975 mg Oral Given 2/21/19 2211)   ketorolac (TORADOL) injection 15 mg (15 mg Intravenous Given 2/21/19 2308)   piperacillin-tazobactam (ZOSYN) 4 5 g in sodium chloride 0 9 % 100 mL IVPB (0 g Intravenous Stopped 2/22/19 0001)   iohexol (OMNIPAQUE) 350 MG/ML injection (MULTI-DOSE) 100 mL (100 mL Intravenous Given 2/21/19 2304)   morphine (PF) 4 mg/mL injection 4 mg (4 mg Intravenous Given 2/22/19 0034)       Diagnostic Studies  Results Reviewed     Procedure Component Value Units Date/Time    Troponin I [[id number]]     Lab Status:  No result Specimen:  Blood     Troponin I [[id number]]     Lab Status:  No result Specimen:  Blood     B-Type Natriuretic Peptide [facility] ONLY) [[id number]]  (Normal) Collected:  02/21/19 2157    Lab Status:  Final result Specimen:  Blood from Arm, Right Updated:  02/21/19 2233     BNP 56 pg/mL     Lactic Acid x2 [[id number]]  (Normal) Collected:  02/21/19 2157    Lab Status:  Final result Specimen:  Blood from Arm, Right Updated:  02/21/19 2232     LACTIC ACID 1 0 mmol/L     Narrative:       Result may be elevated if tourniquet was used during collection  Troponin I [[id number]]  (Normal) Collected:  02/21/19 2157    Lab Status:  Final result Specimen:  Blood from Arm, Right Updated:  02/21/19 2232     Troponin I <0 03 ng/mL     Comprehensive metabolic panel [[id number]]  (Abnormal) Collected:  02/21/19 2157    Lab Status:  Final result Specimen:  Blood from Arm, Right Updated:  02/21/19 2232     Sodium 131 mmol/L      Potassium 4 0 mmol/L      Chloride 98 mmol/L      CO2 27 mmol/L      ANION GAP 6 mmol/L      BUN 17 mg/dL      Creatinine 1 06 mg/dL      Glucose 106 mg/dL      Calcium 9 6 mg/dL      AST 12 U/L      ALT 19 U/L      Alkaline Phosphatase 52 U/L      Total Protein 7 3 g/dL      Albumin 4 1 g/dL      Total Bilirubin 0 80 mg/dL      eGFR 77 ml/min/1 73sq m     Narrative:       National Kidney Disease Education Program recommendations are as follows:  GFR calculation is accurate only with a steady state creatinine  Chronic Kidney disease less than 60 ml/min/1 73 sq  meters  Kidney failure less than 15 ml/min/1 73 sq  meters      Protime-INR [[id number]]  (Abnormal) Collected:  02/21/19 2157    Lab Status:  Final result Specimen:  Blood from Arm, Right Updated:  02/21/19 2221     Protime 13 7 seconds      INR 1 18    APTT [[id number]]  (Normal) Collected:  02/21/19 2157    Lab Status:  Final result Specimen:  Blood from Arm, Right Updated:  02/21/19 2221     PTT 32 seconds     CBC and differential [[id number]]  (Abnormal) Collected:  02/21/19 2156    Lab Status:  Final result Specimen:  Blood from Arm, Right Updated:  02/21/19 2211     WBC 10 50 Thousand/uL      RBC 5 27 Million/uL      Hemoglobin 14 8 g/dL Hematocrit 43 8 %      MCV 83 fL      MCH 28 2 pg      MCHC 33 9 g/dL      RDW 15 0 %      MPV 7 3 fL      Platelets 035 Thousands/uL      nRBC 0 /100 WBCs      Neutrophils Relative 88 %      Lymphocytes Relative 4 %      Monocytes Relative 5 %      Eosinophils Relative 2 %      Basophils Relative 2 %      Neutrophils Absolute 9 20 Thousands/µL      Lymphocytes Absolute 0 50 Thousands/µL      Monocytes Absolute 0 50 Thousand/µL      Eosinophils Absolute 0 20 Thousand/µL      Basophils Absolute 0 20 Thousands/µL     Procalcitonin [[id number]] Collected:  02/21/19 2157    Lab Status: In process Specimen:  Blood from Arm, Right Updated:  02/21/19 2207    Blood culture #2 [[id number]] Collected:  02/21/19 2155    Lab Status: In process Specimen:  Blood from Arm, Right Updated:  02/21/19 2206    Blood culture #1 [[id number]] Collected:  02/21/19 2155    Lab Status: In process Specimen:  Blood from Arm, Right Updated:  02/21/19 2206    Urine Microscopic [[id number]]  (Abnormal) Collected:  02/21/19 2146    Lab Status:  Final result Specimen:  Urine, Clean Catch Updated:  02/21/19 2156     RBC, UA 10-20 /hpf      WBC, UA 4-10 /hpf      Epithelial Cells Occasional /hpf      Bacteria, UA Occasional /hpf     UA w Reflex to Microscopic w Reflex to Culture [[id number]]  (Abnormal) Collected:  02/21/19 2146    Lab Status:  Final result Specimen:  Urine, Clean Catch Updated:  02/21/19 2152     Color, UA Yellow     Clarity, UA Clear     Specific Gravity, UA 1 015     pH, UA 7 5     Leukocytes, UA 1+     Nitrite, UA Negative     Protein, UA Negative mg/dl      Glucose, UA Negative mg/dl      Ketones, UA Trace mg/dl      Urobilinogen, UA 1 0 E U /dl      Bilirubin, UA Negative     Blood, UA 2+    Lactic Acid x2 [[id number]]     Lab Status:  No result Specimen:  Blood                  CT abdomen pelvis with contrast   Final Result by Enoch Lindquist MD (02/22 0038)      1    No acute inflammatory process identified within the abdomen or pelvis  2   Diverticulosis without evidence of diverticulitis  3   Enlarged prostate  4   4 mm right lower lobe pulmonary nodule  Based on current Fleischner Society 2017 Guidelines on incidental pulmonary nodule, no routine follow-up is needed if the patient is considered low risk for lung cancer  If the patient is considered high risk    for lung cancer, 12 month follow-up non-contrast chest CT is recommended  The study was marked in EPIC for significant notification  Workstation performed: [id number]         XR chest 2 views    (Results Pending)              Procedures  ECG 12 Lead Documentation  Date/Time: 2/21/2019 10:11 PM  Performed by: Isabella Mendez DO  Authorized by: Isabella Mendez DO     Indications / Diagnosis:  Sepsis  ECG reviewed by me, the ED Provider: yes    Patient location:  ED  Previous ECG:     Previous ECG:  Compared to current    Comparison ECG info:  6/11/16    Similarity:  Changes noted (AFib improved compared to prior)    Comparison to cardiac monitor: Yes    Interpretation:     Interpretation: normal    Rate:     ECG rate:  95 BPM    ECG rate assessment: normal    Rhythm:     Rhythm: sinus rhythm    Ectopy:     Ectopy: none    QRS:     QRS axis:  Normal    QRS intervals:  Normal  Conduction:     Conduction: normal    ST segments:     ST segments:  Normal  T waves:     T waves: normal    Q waves:     Q waves:  III, aVR and V1           Phone Contacts  ED Phone Contact    ED Course  ED Course as of Feb 22 0057   Thu Feb 21, 2019   2130 Patient seen and examined at bedside  Labs, imaging and EKG ordered  NS bolus 30 cc/kg (ideal body weight) morphine 6 mg and Tylenol 975 mg ordered  2239 Labs reviewed, lactic acid 1 troponin negative and BNP 56  No renal dysfunction and no liver dysfunction  No leukocytosis, WBC 10 5  Plan to give dose of Toradol and obtain CT abdomen pelvis  2246 Zosyn 4 5 g ordered and CT abdomen pelvis pending        Fri Feb 22, 2019   0049 Imaging reviewed no acute abnormalities noted  Discussed with patient results of imaging and labs and plan for discharge home  Discussed with patient discharge plan and instructions  Patient to follow up with primary care physician as an outpatient  Patient to return to ED if any change or worsening condition: increased pain, new onset fever or bleeding  MDM    Disposition  Final diagnoses:   UTI (urinary tract infection)     Time reflects when diagnosis was documented in both MDM as applicable and the Disposition within this note     Time User Action Codes Description Comment    2/22/2019 12:49 AM Zenaida Terry Add [N39 0] UTI (urinary tract infection)       ED Disposition     ED Disposition Condition Date/Time Comment    Discharge Stable Fri Feb 22, 2019 12:49 AM Owen Zelaya discharge to home/self care  Follow-up Information     Follow up With Specialties Details Why [address] [facility], Emergency Medicine In 2 days  Jostin Peterson [address] [facility] [place] [facility] [address]  Emergency Department Emergency Medicine  If symptoms worsen, As needed [address] [place] [id number] [phone]          Patient's Medications   Discharge Prescriptions    CEPHALEXIN (KEFLEX) 500 MG CAPSULE    Take 1 capsule (500 mg total) by mouth every 12 (twelve) hours for 10 days       Start Date: 2/22/2019 End Date: 3/4/2019       Order Dose: 500 mg       Quantity: 20 capsule    Refills: 0     No discharge procedures on file      ED Provider  Electronically Signed by           Chelsy Vela DO  02/22/19 8659

## 2019-02-26 ENCOUNTER — OFFICE VISIT (OUTPATIENT)
Dept: VASCULAR SURGERY | Facility: CLINIC | Age: 59
End: 2019-02-26
Payer: COMMERCIAL

## 2019-02-26 VITALS
WEIGHT: 315 LBS | BODY MASS INDEX: 47.74 KG/M2 | HEIGHT: 68 IN | HEART RATE: 60 BPM | DIASTOLIC BLOOD PRESSURE: 70 MMHG | SYSTOLIC BLOOD PRESSURE: 120 MMHG

## 2019-02-26 DIAGNOSIS — I83.893 SYMPTOMATIC VARICOSE VEINS, BILATERAL: Primary | ICD-10-CM

## 2019-02-26 PROCEDURE — 99213 OFFICE O/P EST LOW 20 MIN: CPT | Performed by: SURGERY

## 2019-02-26 NOTE — PROGRESS NOTES
Assessment/Plan:    Symptomatic varicose veins, bilateral  The patient is complaining of heaviness and aching left lower extremity greater than right with superficial varicosities and hyperpigmentation changes  No history of deep vein thrombosis he has had history of superficial thrombophlebitis in the medial aspect of his left calf  Plan:  After review of his reflux study and his symptomatology we have elected to continue to follow him medically with compression stockings elevation and weight loss program   I will see him back in the office in 6 months       Diagnoses and all orders for this visit:    Symptomatic varicose veins, bilateral        Subjective:      Patient ID: August Flowers is a 62 y o  male  HPI history of heaviness and aching left lower extremity greater than right pigmentation changes history of venous laser/phlebectomy surgery in the past   Medical history of atrial fibrillation and ablation  The following portions of the patient's history were reviewed and updated as appropriate: allergies, current medications, past family history, past medical history, past social history, past surgical history and problem list     Review of Systems  venous stasis changes left greater than right lower extremity, obesity, all other systems negative    Objective:      /70   Pulse 60   Ht 5' 8" (1 727 m)   Wt (!) 148 kg (326 lb)   BMI 49 57 kg/m²          Physical Exam        Oriented x3 no evidence of clinical depression  Neck is supple carotid pulses equal bilaterally no bruits heard    Chest lungs clear, heart regular rhythm  Abdomen soft nontender no evidence of pulsatile masses  Pulses are palpable bilateral femoral popliteal dorsalis pedis pulses palpable bilaterally  Upon standing varicosities are seen mostly in the left lower extremity hyperpigmentation changes and mild stasis dermatitis        Neurological exam intact cranial nerves 2-12 grossly intact no gross motor sensory deficits detected  I have reviewed and made appropriate changes to the review of systems input by the medical assistant  Vitals:    02/26/19 1457   BP: 120/70   Pulse: 60   Weight: (!) 148 kg (326 lb)   Height: 5' 8" (1 727 m)       Patient Active Problem List   Diagnosis    Osteoarthritis of knees, bilateral    Coronary arteriosclerosis in native artery    Sleep apnea    Obesity    Mixed hyperlipidemia    Atrial fibrillation (HCC)    Palpitations    Anxiety    Depression    Essential (primary) hypertension    Dyslipidemia    Chronic venous insufficiency    Symptomatic varicose veins, bilateral       Past Surgical History:   Procedure Laterality Date    ABLATION RADIO FREQUENCY ATRIAL (MAZE/CRYOABLATION)  02/08/2018    Cryoablation lesion summary, 10 applications were delivered      CARDIAC CATHETERIZATION  11/2012    No sig CAD  6/26/17    COLONOSCOPY      [place] TOTAL KNEE ARTHROPLASTY Bilateral 6/1/2016    Procedure: ARTHROPLASTY KNEE TOTAL BILATERAL;  Surgeon: Claudio Reid MD;  Location: [facility];  Service: Orthopedics       Family History   Problem Relation Age of Onset    Coronary artery disease Family         less than 61 yrs of age   [facility] Heart attack Mother         MI    Heart attack Sister         MI    Heart attack Brother         MI       Social History     Socioeconomic History    Marital status: Unknown     Spouse name: Not on file    Number of children: Not on file    Years of education: Not on file    Highest education level: Not on file   Occupational History    Not on file   Social Needs    Financial resource strain: Not on file    Food insecurity:     Worry: Not on file     Inability: Not on file    Transportation needs:     Medical: Not on file     Non-medical: Not on file   Tobacco Use    Smoking status: Former Smoker     Packs/day: 1 00     Years: 18 00     Pack years: 18 00     Types: Cigarettes    Smokeless tobacco: Never Used    Tobacco comment: Quit 21 years ago Substance and Sexual Activity    Alcohol use:  Yes    Drug use: No    Sexual activity: Not on file   Lifestyle    Physical activity:     Days per week: Not on file     Minutes per session: Not on file    Stress: Not on file   Relationships    Social connections:     Talks on phone: Not on file     Gets together: Not on file     Attends Gnosticism service: Not on file     Active member of club or organization: Not on file     Attends meetings of clubs or organizations: Not on file     Relationship status: Not on file    Intimate partner violence:     Fear of current or ex partner: Not on file     Emotionally abused: Not on file     Physically abused: Not on file     Forced sexual activity: Not on file   Other Topics Concern    Not on file   Social History Narrative    Not on file       Allergies   Allergen Reactions    Benadryl [Diphenhydramine] Other (See Comments)     Tachycardia         Current Outpatient Medications:     albuterol (VENTOLIN HFA) 90 mcg/act inhaler, Inhale 2 puffs every 6 (six) hours as needed for wheezing, Disp: 18 g, Rfl: 0    ALPRAZolam (XANAX) 1 mg tablet, Take 1 mg by mouth daily at bedtime, Disp: , Rfl:     aspirin 325 mg tablet, Take 325 mg by mouth daily, Disp: , Rfl:     busPIRone (BUSPAR) 5 mg tablet, take 2 tablets in the morning and 1 tablet at night, Disp: , Rfl:     cephalexin (KEFLEX) 500 mg capsule, Take 1 capsule (500 mg total) by mouth every 12 (twelve) hours for 10 days, Disp: 20 capsule, Rfl: 0    FLUoxetine (PROzac) 20 mg capsule, Take 40 mg by mouth daily In the morning, Disp: , Rfl:     metoprolol succinate (TOPROL-XL) 50 mg 24 hr tablet, Take 1 tablet (50 mg total) by mouth daily, Disp: 30 tablet, Rfl: 5    zolpidem (AMBIEN) 5 mg tablet, Take 5 mg by mouth daily at bedtime as needed for sleep , Disp: , Rfl:

## 2019-02-26 NOTE — PATIENT INSTRUCTIONS
The patient is complaining of heaviness and aching left lower extremity greater than right with superficial varicosities and hyperpigmentation changes  No history of deep vein thrombosis he has had history of superficial thrombophlebitis in the medial aspect of his left calf      Plan:  After review of his reflux study and his symptomatology we have elected to continue to follow him medically with compression stockings elevation and weight loss program   I will see him back in the office in 6 months

## 2019-02-27 LAB
BACTERIA BLD CULT: NORMAL
BACTERIA BLD CULT: NORMAL

## 2019-04-24 ENCOUNTER — APPOINTMENT (OUTPATIENT)
Dept: LAB | Facility: MEDICAL CENTER | Age: 59
End: 2019-04-24
Payer: COMMERCIAL

## 2019-04-24 ENCOUNTER — TRANSCRIBE ORDERS (OUTPATIENT)
Dept: LAB | Facility: MEDICAL CENTER | Age: 59
End: 2019-04-24

## 2019-04-24 DIAGNOSIS — Z12.5 SPECIAL SCREENING FOR MALIGNANT NEOPLASM OF PROSTATE: ICD-10-CM

## 2019-04-24 DIAGNOSIS — R25.1 TREMORS OF NERVOUS SYSTEM: ICD-10-CM

## 2019-04-24 DIAGNOSIS — R35.0 URINE FREQUENCY: Primary | ICD-10-CM

## 2019-04-24 DIAGNOSIS — R35.0 URINE FREQUENCY: ICD-10-CM

## 2019-04-24 DIAGNOSIS — Z09 HOSPITAL DISCHARGE FOLLOW-UP: ICD-10-CM

## 2019-04-24 PROCEDURE — 36415 COLL VENOUS BLD VENIPUNCTURE: CPT

## 2019-04-24 PROCEDURE — 82607 VITAMIN B-12: CPT

## 2019-04-24 PROCEDURE — 84443 ASSAY THYROID STIM HORMONE: CPT

## 2019-04-24 PROCEDURE — 84439 ASSAY OF FREE THYROXINE: CPT

## 2019-04-24 PROCEDURE — G0103 PSA SCREENING: HCPCS

## 2019-04-25 ENCOUNTER — TRANSCRIBE ORDERS (OUTPATIENT)
Dept: LAB | Facility: MEDICAL CENTER | Age: 59
End: 2019-04-25

## 2019-04-25 ENCOUNTER — APPOINTMENT (OUTPATIENT)
Dept: LAB | Facility: MEDICAL CENTER | Age: 59
End: 2019-04-25
Payer: COMMERCIAL

## 2019-04-25 DIAGNOSIS — R35.0 URINE FREQUENCY: Primary | ICD-10-CM

## 2019-04-25 LAB
PSA SERPL-MCNC: 0.6 NG/ML (ref 0–4)
T4 FREE SERPL-MCNC: 0.9 NG/DL (ref 0.76–1.46)
TSH SERPL DL<=0.05 MIU/L-ACNC: 2.75 UIU/ML (ref 0.36–3.74)
VIT B12 SERPL-MCNC: 392 PG/ML (ref 100–900)

## 2019-04-25 PROCEDURE — 87186 SC STD MICRODIL/AGAR DIL: CPT

## 2019-04-25 PROCEDURE — 87086 URINE CULTURE/COLONY COUNT: CPT

## 2019-04-25 PROCEDURE — 87077 CULTURE AEROBIC IDENTIFY: CPT

## 2019-04-27 LAB — BACTERIA UR CULT: ABNORMAL

## 2019-06-28 ENCOUNTER — OFFICE VISIT (OUTPATIENT)
Dept: CARDIOLOGY CLINIC | Facility: CLINIC | Age: 59
End: 2019-06-28
Payer: COMMERCIAL

## 2019-06-28 VITALS
HEART RATE: 74 BPM | BODY MASS INDEX: 47.74 KG/M2 | DIASTOLIC BLOOD PRESSURE: 80 MMHG | HEIGHT: 68 IN | WEIGHT: 315 LBS | SYSTOLIC BLOOD PRESSURE: 140 MMHG

## 2019-06-28 DIAGNOSIS — I10 ESSENTIAL (PRIMARY) HYPERTENSION: Primary | ICD-10-CM

## 2019-06-28 DIAGNOSIS — I48.0 PAROXYSMAL ATRIAL FIBRILLATION (HCC): ICD-10-CM

## 2019-06-28 DIAGNOSIS — E78.2 MIXED HYPERLIPIDEMIA: ICD-10-CM

## 2019-06-28 PROCEDURE — 99214 OFFICE O/P EST MOD 30 MIN: CPT | Performed by: INTERNAL MEDICINE

## 2019-06-28 RX ORDER — METOPROLOL SUCCINATE 50 MG/1
50 TABLET, EXTENDED RELEASE ORAL DAILY
Qty: 90 TABLET | Refills: 3 | Status: SHIPPED | OUTPATIENT
Start: 2019-06-28 | End: 2019-07-15 | Stop reason: SDUPTHER

## 2019-07-15 DIAGNOSIS — I48.0 PAROXYSMAL ATRIAL FIBRILLATION (HCC): ICD-10-CM

## 2019-07-15 RX ORDER — METOPROLOL SUCCINATE 50 MG/1
50 TABLET, EXTENDED RELEASE ORAL DAILY
Qty: 30 TABLET | Refills: 5 | Status: SHIPPED | OUTPATIENT
Start: 2019-07-15 | End: 2019-12-11 | Stop reason: SDUPTHER

## 2019-08-29 ENCOUNTER — HOSPITAL ENCOUNTER (EMERGENCY)
Facility: HOSPITAL | Age: 59
Discharge: HOME | End: 2019-08-29
Attending: INTERNAL MEDICINE | Admitting: INTERNAL MEDICINE
Payer: COMMERCIAL

## 2019-08-29 ENCOUNTER — APPOINTMENT (OUTPATIENT)
Dept: RADIOLOGY | Facility: CLINIC | Age: 59
End: 2019-08-29
Payer: COMMERCIAL

## 2019-08-29 ENCOUNTER — OFFICE VISIT (OUTPATIENT)
Dept: URGENT CARE | Facility: CLINIC | Age: 59
End: 2019-08-29
Payer: COMMERCIAL

## 2019-08-29 VITALS
DIASTOLIC BLOOD PRESSURE: 67 MMHG | OXYGEN SATURATION: 97 % | SYSTOLIC BLOOD PRESSURE: 138 MMHG | HEART RATE: 61 BPM | TEMPERATURE: 98.2 F | RESPIRATION RATE: 18 BRPM

## 2019-08-29 VITALS
OXYGEN SATURATION: 99 % | BODY MASS INDEX: 47.74 KG/M2 | SYSTOLIC BLOOD PRESSURE: 124 MMHG | HEIGHT: 68 IN | DIASTOLIC BLOOD PRESSURE: 71 MMHG | WEIGHT: 315 LBS | RESPIRATION RATE: 18 BRPM | HEART RATE: 84 BPM

## 2019-08-29 DIAGNOSIS — R07.9 CHEST PAIN, UNSPECIFIED TYPE: Primary | ICD-10-CM

## 2019-08-29 DIAGNOSIS — R07.9 CHEST PAIN, UNSPECIFIED TYPE: ICD-10-CM

## 2019-08-29 DIAGNOSIS — R07.89 ATYPICAL CHEST PAIN: Primary | ICD-10-CM

## 2019-08-29 LAB
ANION GAP SERPL CALCULATED.3IONS-SCNC: 5 MMOL/L (ref 4–13)
APTT PPP: 34 SECONDS (ref 23–37)
BASOPHILS # BLD AUTO: 0 THOUSANDS/ΜL (ref 0–0.1)
BASOPHILS NFR BLD AUTO: 1 % (ref 0–2)
BUN SERPL-MCNC: 18 MG/DL (ref 7–25)
CALCIUM SERPL-MCNC: 9.4 MG/DL (ref 8.6–10.5)
CHLORIDE SERPL-SCNC: 104 MMOL/L (ref 98–107)
CO2 SERPL-SCNC: 29 MMOL/L (ref 21–31)
CREAT SERPL-MCNC: 0.9 MG/DL (ref 0.7–1.3)
DEPRECATED D DIMER PPP: 172 NG/ML (FEU)
EOSINOPHIL # BLD AUTO: 0.3 THOUSAND/ΜL (ref 0–0.61)
EOSINOPHIL NFR BLD AUTO: 6 % (ref 0–5)
ERYTHROCYTE [DISTWIDTH] IN BLOOD BY AUTOMATED COUNT: 15.2 % (ref 11.5–14.5)
GFR SERPL CREATININE-BSD FRML MDRD: 94 ML/MIN/1.73SQ M
GLUCOSE SERPL-MCNC: 88 MG/DL (ref 65–99)
HCT VFR BLD AUTO: 43.5 % (ref 42–47)
HGB BLD-MCNC: 15.1 G/DL (ref 14–18)
INR PPP: 1.13 (ref 0.9–1.5)
LYMPHOCYTES # BLD AUTO: 1 THOUSANDS/ΜL (ref 0.6–4.47)
LYMPHOCYTES NFR BLD AUTO: 17 % (ref 21–51)
MCH RBC QN AUTO: 29.3 PG (ref 26–34)
MCHC RBC AUTO-ENTMCNC: 34.7 G/DL (ref 31–37)
MCV RBC AUTO: 85 FL (ref 81–99)
MONOCYTES # BLD AUTO: 0.8 THOUSAND/ΜL (ref 0.17–1.22)
MONOCYTES NFR BLD AUTO: 14 % (ref 2–12)
NEUTROPHILS # BLD AUTO: 3.5 THOUSANDS/ΜL (ref 1.4–6.5)
NEUTS SEG NFR BLD AUTO: 63 % (ref 42–75)
PLATELET # BLD AUTO: 152 THOUSANDS/UL (ref 149–390)
PMV BLD AUTO: 7.4 FL (ref 8.6–11.7)
POTASSIUM SERPL-SCNC: 4.2 MMOL/L (ref 3.5–5.5)
PROTHROMBIN TIME: 13.1 SECONDS (ref 10.2–13)
RBC # BLD AUTO: 5.14 MILLION/UL (ref 4.3–5.9)
SODIUM SERPL-SCNC: 138 MMOL/L (ref 134–143)
TROPONIN I SERPL-MCNC: <0.03 NG/ML
WBC # BLD AUTO: 5.6 THOUSAND/UL (ref 4.8–10.8)

## 2019-08-29 PROCEDURE — 99285 EMERGENCY DEPT VISIT HI MDM: CPT

## 2019-08-29 PROCEDURE — 93005 ELECTROCARDIOGRAM TRACING: CPT | Performed by: NURSE PRACTITIONER

## 2019-08-29 PROCEDURE — 99213 OFFICE O/P EST LOW 20 MIN: CPT | Performed by: NURSE PRACTITIONER

## 2019-08-29 PROCEDURE — 84484 ASSAY OF TROPONIN QUANT: CPT | Performed by: INTERNAL MEDICINE

## 2019-08-29 PROCEDURE — 96374 THER/PROPH/DIAG INJ IV PUSH: CPT

## 2019-08-29 PROCEDURE — 85610 PROTHROMBIN TIME: CPT | Performed by: INTERNAL MEDICINE

## 2019-08-29 PROCEDURE — 71046 X-RAY EXAM CHEST 2 VIEWS: CPT

## 2019-08-29 PROCEDURE — 36415 COLL VENOUS BLD VENIPUNCTURE: CPT | Performed by: INTERNAL MEDICINE

## 2019-08-29 PROCEDURE — 85379 FIBRIN DEGRADATION QUANT: CPT | Performed by: INTERNAL MEDICINE

## 2019-08-29 PROCEDURE — 80048 BASIC METABOLIC PNL TOTAL CA: CPT | Performed by: INTERNAL MEDICINE

## 2019-08-29 PROCEDURE — 93005 ELECTROCARDIOGRAM TRACING: CPT

## 2019-08-29 PROCEDURE — 85025 COMPLETE CBC W/AUTO DIFF WBC: CPT | Performed by: INTERNAL MEDICINE

## 2019-08-29 PROCEDURE — 85730 THROMBOPLASTIN TIME PARTIAL: CPT | Performed by: INTERNAL MEDICINE

## 2019-08-29 RX ORDER — 0.9 % SODIUM CHLORIDE 0.9 %
3 VIAL (ML) INJECTION AS NEEDED
Status: DISCONTINUED | OUTPATIENT
Start: 2019-08-29 | End: 2019-08-29 | Stop reason: HOSPADM

## 2019-08-29 RX ORDER — KETOROLAC TROMETHAMINE 30 MG/ML
30 INJECTION, SOLUTION INTRAMUSCULAR; INTRAVENOUS ONCE
Status: COMPLETED | OUTPATIENT
Start: 2019-08-29 | End: 2019-08-29

## 2019-08-29 RX ORDER — IBUPROFEN 600 MG/1
600 TABLET ORAL EVERY 6 HOURS PRN
Qty: 20 TABLET | Refills: 0 | Status: SHIPPED | OUTPATIENT
Start: 2019-08-29 | End: 2019-10-08 | Stop reason: HOSPADM

## 2019-08-29 RX ADMIN — KETOROLAC TROMETHAMINE 30 MG: 30 INJECTION, SOLUTION INTRAMUSCULAR; INTRAVENOUS at 19:28

## 2019-08-29 NOTE — PROGRESS NOTES
[address]        NAME: Alfredo Sinclair is a 62 y o  male  : 1960    MRN: [id number]  DATE: 2019  TIME: 6:32 PM    Assessment and Plan   Chest pain, unspecified type [R07 9]  1  Chest pain, unspecified type  XR chest pa & lateral    Transfer to other facility     Discussed with patient since pain is not reproducible on exam discussed with patient and pain is numbers on exam and not worsening but movements I recommend he go to the ER for full evaluation of symptoms  Patient Instructions     Patient Instructions   As we discussed I would recommend going to the ER for full evaluation  EKG is normal sinus rhythm with PVCs  Chest x-ray does not show any abnormality  You were agreeable with plan and will go to [facility]  Chief Complaint     Chief Complaint   Patient presents with    Chest Pain     Pt c/o left sided chest pain for two weeks  History of Present Illness   Alfredo Sinclair presents to the clinic c/o    This is a 72-year-old male here today with complaints of left-sided chest pain  He states symptoms started about 2 weeks ago  He describes the pain as a toothache  He has been trying Aleve Motrin which have not been helping  He states he thinks this pain is muscle  He denies any specific injury or trauma  Although he does say he the some heavy stuff  He states the pain is fairly constant  He denies any increased shortness of breath although he states he does have some and shortness of breath at baseline  He denies any headaches  He does state that he has had some numbness into the left 5th finger  He has a history of having an AFib with ablation  He states his mother and his father both have had myocardial infarctions  Review of Systems   Review of Systems   Constitutional: Negative  HENT: Negative  Respiratory: Negative  Cardiovascular: Positive for chest pain  Negative for palpitations  Neurological: Positive for numbness  Psychiatric/Behavioral: Negative  Current Medications     Long-Term Medications   Medication Sig Dispense Refill    ALPRAZolam (XANAX) 1 mg tablet Take 1 mg by mouth daily at bedtime      FLUoxetine (PROzac) 20 mg capsule Take 40 mg by mouth daily In the morning      metoprolol succinate (TOPROL-XL) 50 mg 24 hr tablet Take 1 tablet (50 mg total) by mouth daily 30 tablet 5       Current Allergies     Allergies as of 08/29/2019 - Reviewed 08/29/2019   Allergen Reaction Noted    Benadryl [diphenhydramine] Other (See Comments) 05/03/2016            The following portions of the patient's history were reviewed and updated as appropriate: allergies, current medications, past family history, past medical history, past social history, past surgical history and problem list     Objective   /67   Pulse 61   Temp 98 2 °F (36 8 °C)   Resp 18   SpO2 97%        Physical Exam     Physical Exam   Constitutional: He is oriented to person, place, and time  He appears well-developed and well-nourished  Cardiovascular: Normal rate and regular rhythm  Pulmonary/Chest: Effort normal and breath sounds normal    Musculoskeletal:   No reproducible pain over chest wall no increased pain with movements of the left arm or left shoulder   Neurological: He is alert and oriented to person, place, and time  Psychiatric: He has a normal mood and affect  His behavior is normal    Nursing note and vitals reviewed

## 2019-08-29 NOTE — ED PROVIDER NOTES
History  Chief Complaint   Patient presents with    Chest Pain     dull aching pain x2 weeks, described as constant today  was sent by urgent care facility  chest pain nonreproduceable  denies SOB, dizziness, N/V     62year-old white male with history of atrial fibrillation, sleep apnea, and significant anxiety presents with chest pain for the last 2 weeks  Been fairly constant  He describes it is a dull toothache  Just not going away so presents for further evaluation  He did not injury  Has not been doing any heavy lifting  Inspiration does not make it worse or better  Thinks it may be more muscle but he is just not sure  Significant family history significant for premature heart disease in a brother  Exertion does not seem to make the pain worse or better  He has a numb left pinky now for several days as well  Prior to Admission Medications   Prescriptions Last Dose Informant Patient Reported? Taking? ALPRAZolam (XANAX) 1 mg tablet 8/28/2019 at Unknown time  Yes Yes   Sig: Take 1 mg by mouth daily at bedtime   FLUoxetine (PROzac) 20 mg capsule 8/29/2019 at Unknown time  Yes Yes   Sig: Take 40 mg by mouth daily In the morning   albuterol (VENTOLIN HFA) 90 mcg/act inhaler Not Taking at Unknown time  No No   Sig: Inhale 2 puffs every 6 (six) hours as needed for wheezing   Patient not taking: Reported on 6/28/2019   aspirin 325 mg tablet 8/28/2019 at Unknown time  Yes Yes   Sig: Take 325 mg by mouth daily   busPIRone (BUSPAR) 5 mg tablet 8/29/2019 at Unknown time  Yes Yes   Sig: take 2 tablets in the morning and 1 tablet at night   metoprolol succinate (TOPROL-XL) 50 mg 24 hr tablet 8/29/2019 at Unknown time  No Yes   Sig: Take 1 tablet (50 mg total) by mouth daily   zolpidem (AMBIEN) 5 mg tablet 8/28/2019 at Unknown time  Yes Yes   Sig: Take 5 mg by mouth daily at bedtime as needed for sleep        Facility-Administered Medications: None       Past Medical History:   Diagnosis Date    Anxiety  Arthritis     Athscl heart disease of native coronary artery w/o ang pctrs     Atrial fibrillation ([facility] [address] ) 2012 and 2016    CPAP (continuous positive airway pressure) dependence     Diverticulitis     Edema     Legs and hands    Essential tremor     Hard of hearing     Hx of cardiovascular stress test 04/28/2016    EF0 50 (50%) normal LVSF  No evidence for prior ischemia or MI    Hx of echocardiogram 05/18/2016    EF0 55 (55%) severely technically limited suboptimal study  No significant valvular insufficiency or stenosis   / EF0 60 (60%) Trace MR and TR 6/22/17  / EF0 65 (65%) technically difficult study  no hemodynamically significant valve disease identified  11/30/17      Hypercholesteremia     Hypertension     Mixed hyperlipidemia     Morbid obesity ([facility] [address] )     Obesity     Panic attacks     Rash     On extremeties and chest  Chronic, recurrent    Sleep apnea     Varicose veins of left lower extremity     Bilateral       Past Surgical History:   Procedure Laterality Date    ABLATION RADIO FREQUENCY ATRIAL (MAZE/CRYOABLATION)  02/08/2018    Cryoablation lesion summary, 10 applications were delivered   CARDIAC CATHETERIZATION  11/2012    No sig CAD  6/26/17    COLONOSCOPY      JOINT REPLACEMENT Bilateral     knees    ME TOTAL KNEE ARTHROPLASTY Bilateral 6/1/2016    Procedure: ARTHROPLASTY KNEE TOTAL BILATERAL;  Surgeon: Ed Connors MD;  Location: [facility];  Service: Orthopedics       Family History   Problem Relation Age of Onset    Coronary artery disease Family         less than 61 yrs of age   Shelly Biggs Heart attack Mother         MI    Heart attack Sister         MI    Heart attack Brother         MI     I have reviewed and agree with the history as documented      Social History     Tobacco Use    Smoking status: Former Smoker     Packs/day: 1 00     Years: 18 00     Pack years: 18 00     Types: Cigarettes    Smokeless tobacco: Never Used    Tobacco comment: Quit 21 years ago Substance Use Topics    Alcohol use: Yes     Comment: socially    Drug use: No        Review of Systems   Constitutional: Negative for chills and fever  HENT: Negative for ear pain, rhinorrhea and sore throat  Eyes: Negative for pain, redness and visual disturbance  Respiratory: Negative for cough and shortness of breath  Cardiovascular: Positive for chest pain and leg swelling  Gastrointestinal: Negative for abdominal pain, diarrhea, nausea and vomiting  Genitourinary: Negative for dysuria, flank pain, frequency and urgency  Musculoskeletal: Positive for myalgias  Negative for back pain and neck pain  Skin: Negative for rash  Neurological: Negative for dizziness, weakness, light-headedness and headaches  Hematological: Negative  Psychiatric/Behavioral: Negative for agitation, confusion and suicidal ideas  The patient is not nervous/anxious  All other systems reviewed and are negative  Physical Exam  Physical Exam   Constitutional: He is oriented to person, place, and time  He appears well-developed  Morbidly obese   HENT:   Nose: Nose normal    Mouth/Throat: Oropharynx is clear and moist  No oropharyngeal exudate  Eyes: Pupils are equal, round, and reactive to light  Conjunctivae and EOM are normal  No scleral icterus  Neck: Normal range of motion  Neck supple  No JVD present  No tracheal deviation present  Cardiovascular: Normal rate, regular rhythm and normal heart sounds  No murmur heard  Pulmonary/Chest: Effort normal and breath sounds normal  No respiratory distress  He has no wheezes  He has no rales  Abdominal: Soft  Bowel sounds are normal  There is no tenderness  There is no guarding  Musculoskeletal: He exhibits no edema or tenderness  3+ lower extremity edema which is pitting, negative Homans   Neurological: He is alert and oriented to person, place, and time  No cranial nerve deficit or sensory deficit  He exhibits normal muscle tone     5/5 motor, nl sens   Tremor in the right hand  Skin: Skin is warm and dry  Psychiatric: He has a normal mood and affect  His behavior is normal    Nursing note and vitals reviewed        Vital Signs  ED Triage Vitals [08/29/19 1817]   Temp Pulse Respirations Blood Pressure SpO2   -- 64 18 (!) 173/77 100 %      Temp src Heart Rate Source Patient Position - Orthostatic VS BP Location FiO2 (%)   -- -- -- -- --      Pain Score       7           Vitals:    08/29/19 1817   BP: (!) 173/77   Pulse: 64         Visual Acuity      ED Medications  Medications   sodium chloride (PF) 0 9 % injection 3 mL (has no administration in time range)       Diagnostic Studies  Results Reviewed     Procedure Component Value Units Date/Time    CBC and differential [[id number]]     Lab Status:  No result Specimen:  Blood     Basic metabolic panel [[id number]]     Lab Status:  No result Specimen:  Blood     Troponin I [[id number]]     Lab Status:  No result Specimen:  Blood     D-Dimer [[id number]]     Lab Status:  No result Specimen:  Blood                  X-ray chest 2 views    (Results Pending)              Procedures  ECG 12 Lead Documentation Only  Date/Time: 8/29/2019 6:30 PM  Performed by: Edmundo Solis DO  Authorized by: Edmundo Solis DO     Indications / Diagnosis:  Chest pain  ECG reviewed by me, the ED Provider: yes    Patient location:  ED  Previous ECG:     Previous ECG:  Unavailable  Interpretation:     Interpretation: normal    Rate:     ECG rate:  61    ECG rate assessment: normal    Rhythm:     Rhythm: sinus rhythm    Ectopy:     Ectopy: none    QRS:     QRS axis:  Normal  Conduction:     Conduction: normal    ST segments:     ST segments:  Normal  T waves:     T waves: normal             ED Course  ED Course as of Aug 29 1908   Thu Aug 29, 2019   1908 Chest x-ray reviewed from Urgent Care reveal no acute disease                                  MDM    Disposition  Final diagnoses:   None     ED Disposition     None      Follow-up Information    None         Patient's Medications   Discharge Prescriptions    No medications on file     No discharge procedures on file      ED Provider  Electronically Signed by           Edmundo Solis DO  08/29/19 [id number]

## 2019-08-30 LAB
ATRIAL RATE: 61 BPM
ATRIAL RATE: 62 BPM
P AXIS: 24 DEGREES
P AXIS: 7 DEGREES
PR INTERVAL: 146 MS
PR INTERVAL: 152 MS
QRS AXIS: 23 DEGREES
QRS AXIS: 30 DEGREES
QRSD INTERVAL: 94 MS
QRSD INTERVAL: 98 MS
QT INTERVAL: 428 MS
QT INTERVAL: 432 MS
QTC INTERVAL: 430 MS
QTC INTERVAL: 438 MS
T WAVE AXIS: 52 DEGREES
T WAVE AXIS: 70 DEGREES
VENTRICULAR RATE: 61 BPM
VENTRICULAR RATE: 62 BPM

## 2019-08-30 PROCEDURE — 93010 ELECTROCARDIOGRAM REPORT: CPT | Performed by: INTERNAL MEDICINE

## 2019-09-09 ENCOUNTER — TRANSCRIBE ORDERS (OUTPATIENT)
Dept: RADIOLOGY | Facility: CLINIC | Age: 59
End: 2019-09-09

## 2019-09-09 ENCOUNTER — APPOINTMENT (OUTPATIENT)
Dept: RADIOLOGY | Facility: CLINIC | Age: 59
End: 2019-09-09
Payer: COMMERCIAL

## 2019-09-09 DIAGNOSIS — M54.2 NECK PAIN: ICD-10-CM

## 2019-09-09 DIAGNOSIS — I25.10 CORONARY ARTERIOSCLEROSIS IN NATIVE ARTERY: ICD-10-CM

## 2019-09-09 DIAGNOSIS — R06.02 SOB (SHORTNESS OF BREATH): ICD-10-CM

## 2019-09-09 DIAGNOSIS — M54.2 NECK PAIN: Primary | ICD-10-CM

## 2019-09-09 DIAGNOSIS — I48.0 PAROXYSMAL ATRIAL FIBRILLATION (HCC): ICD-10-CM

## 2019-09-09 DIAGNOSIS — R07.89 OTHER CHEST PAIN: ICD-10-CM

## 2019-09-09 PROCEDURE — 72050 X-RAY EXAM NECK SPINE 4/5VWS: CPT

## 2019-09-20 ENCOUNTER — HOSPITAL ENCOUNTER (OUTPATIENT)
Dept: NON INVASIVE DIAGNOSTICS | Facility: CLINIC | Age: 59
Discharge: HOME | End: 2019-09-20
Payer: COMMERCIAL

## 2019-09-20 ENCOUNTER — OFFICE VISIT (OUTPATIENT)
Dept: CARDIOLOGY CLINIC | Facility: CLINIC | Age: 59
End: 2019-09-20
Payer: COMMERCIAL

## 2019-09-20 VITALS
DIASTOLIC BLOOD PRESSURE: 84 MMHG | SYSTOLIC BLOOD PRESSURE: 142 MMHG | BODY MASS INDEX: 49.44 KG/M2 | WEIGHT: 315 LBS | HEIGHT: 67 IN | HEART RATE: 58 BPM

## 2019-09-20 DIAGNOSIS — R07.2 PRECORDIAL PAIN: ICD-10-CM

## 2019-09-20 DIAGNOSIS — R07.89 OTHER CHEST PAIN: ICD-10-CM

## 2019-09-20 DIAGNOSIS — I10 ESSENTIAL (PRIMARY) HYPERTENSION: ICD-10-CM

## 2019-09-20 DIAGNOSIS — I48.0 PAROXYSMAL ATRIAL FIBRILLATION (HCC): Primary | ICD-10-CM

## 2019-09-20 DIAGNOSIS — I48.0 PAROXYSMAL ATRIAL FIBRILLATION (HCC): ICD-10-CM

## 2019-09-20 DIAGNOSIS — I25.10 CORONARY ARTERIOSCLEROSIS IN NATIVE ARTERY: ICD-10-CM

## 2019-09-20 DIAGNOSIS — R06.02 SOB (SHORTNESS OF BREATH): ICD-10-CM

## 2019-09-20 LAB
ARRHY DURING EX: NORMAL
CHEST PAIN STATEMENT: NORMAL
MAX DIASTOLIC BP: 84 MMHG
MAX HEART RATE: 80 BPM
MAX PREDICTED HEART RATE: 162 BPM
MAX. SYSTOLIC BP: 146 MMHG
PROTOCOL NAME: NORMAL
REASON FOR TERMINATION: NORMAL
TARGET HR FORMULA: NORMAL
TEST INDICATION: NORMAL
TIME IN EXERCISE PHASE: NORMAL

## 2019-09-20 PROCEDURE — 78452 HT MUSCLE IMAGE SPECT MULT: CPT | Performed by: INTERNAL MEDICINE

## 2019-09-20 PROCEDURE — 93017 CV STRESS TEST TRACING ONLY: CPT

## 2019-09-20 PROCEDURE — 78452 HT MUSCLE IMAGE SPECT MULT: CPT

## 2019-09-20 PROCEDURE — 99214 OFFICE O/P EST MOD 30 MIN: CPT | Performed by: INTERNAL MEDICINE

## 2019-09-20 PROCEDURE — 93018 CV STRESS TEST I&R ONLY: CPT | Performed by: INTERNAL MEDICINE

## 2019-09-20 PROCEDURE — A9502 TC99M TETROFOSMIN: HCPCS

## 2019-09-20 PROCEDURE — 93016 CV STRESS TEST SUPVJ ONLY: CPT | Performed by: INTERNAL MEDICINE

## 2019-09-20 RX ADMIN — REGADENOSON 0.4 MG: 0.08 INJECTION, SOLUTION INTRAVENOUS at 13:35

## 2019-09-20 NOTE — PATIENT INSTRUCTIONS
Chest Pain   AMBULATORY CARE:   Chest pain  can be caused by a range of conditions, from not serious to life-threatening  It may be caused by a heart attack or a blood clot in your lungs  Sometimes chest pain or pressure is caused by poor blood flow to your heart (angina)  Infection, inflammation, or a fracture in the bones or cartilage in your chest can cause pain or discomfort  Chest pain can also be a symptom of a digestive problem, such as acid reflux or a stomach ulcer  An anxiety attack or a strong emotion such as anger can also cause chest pain  It is important to follow up with your healthcare provider to find the cause of your chest pain  Common symptoms you may have with chest pain:   · Fever or sweating     · Nausea or vomiting     · Shortness of breath     · Discomfort or pressure that spreads from your chest to your back, jaw, or arm     · A racing or slow heartbeat     · Feeling weak, tired, or faint  Call 911 if:   · You have any of the following signs of a heart attack:      ¨ Squeezing, pressure, or pain in your chest that lasts longer than 5 minutes or returns    ¨ Discomfort or pain in your back, neck, jaw, stomach, or arm     ¨ Trouble breathing    ¨ Nausea or vomiting    ¨ Lightheadedness or a sudden cold sweat, especially with chest pain or trouble breathing    Seek care immediately if:   · You have chest discomfort that gets worse, even with medicine  · You cough or vomit blood  · Your bowel movements are black or bloody  · You cannot stop vomiting, or it hurts to swallow  Contact your healthcare provider if:   · You have questions or concerns about your condition or care  Treatment for chest pain  may include medicine to treat your symptoms while your healthcare provider finds the cause of your chest pain  · Medicines  may be given to treat the cause of your chest pain  Examples include pain medicine, anxiety medicine, or medicines to increase blood flow to your heart  · Do not take certain medicines without asking your healthcare provider first   These include NSAIDs, herbal or vitamin supplements, or hormones (estrogen or progestin)  Follow up with your healthcare provider within 72 hours, or as directed: You may need to return for more tests to find the cause of your chest pain  You may be referred to a specialist, such as a cardiologist or gastroenterologist  Write down your questions so you remember to ask them during your visits  Healthy living tips: The following are general healthy guidelines  If your chest pain is caused by a heart problem, your healthcare provider will give you specific guidelines to follow  · Do not smoke  Nicotine and other chemicals in cigarettes and cigars can cause lung and heart damage  Ask your healthcare provider for information if you currently smoke and need help to quit  E-cigarettes or smokeless tobacco still contain nicotine  Talk to your healthcare provider before you use these products  · Eat a variety of healthy, low-fat foods  Healthy foods include fruits, vegetables, whole-grain breads, low-fat dairy products, beans, lean meats, and fish  Ask for more information about a heart healthy diet  · Ask about activity  Your healthcare provider will tell you which activities to limit or avoid  Ask when you can drive, return to work, and have sex  Ask about the best exercise plan for you  · Maintain a healthy weight  Ask your healthcare provider how much you should weigh  Ask him or her to help you create a weight loss plan if you are overweight  · Get the flu and pneumonia vaccines  All adults should get the influenza (flu) vaccine  Get it every year as soon as it becomes available  The pneumococcal vaccine is given to adults aged 72 years or older  The vaccine is given every 5 years to prevent pneumococcal disease, such as pneumonia    © 2017 [employer] Information is for End User's use only and may not be sold, redistributed or otherwise used for commercial purposes  All illustrations and images included in CareNotes® are the copyrighted property of A D A M , Inc  or Steffen Bowden  The above information is an  only  It is not intended as medical advice for individual conditions or treatments  Talk to your doctor, nurse or pharmacist before following any medical regimen to see if it is safe and effective for you

## 2019-09-20 NOTE — PROGRESS NOTES
Subjective:        Patient ID: Sanya Macias is a 62 y o  male  Chief Complaint:  Alvy Schilder is here after ER evaluation for some atypical chest pain  Nuclear stress test today revealed normal myocardial perfusion  Visible ejection fraction 50-55% no regional   It is clearly reproducible with digital palpation over his left trapezius area and left anterior chest wall  Is increased with a  Chest fly maneuver of his arms   Says he has lost some strength is upper arm  Fourth and 5th digits are numb  Just started Flexeril yesterday  On prescription strength nap percent  Also on prednisone at your direction wants know if I could give him something stronger  Pain rather continuous  In the ER his troponin was normal EKG normal   Chest x-ray no acute process  The following portions of the patient's history were reviewed and updated as appropriate: allergies, current medications, past family history, past medical history, past social history, past surgical history and problem list   Review of Systems   Constitution: Negative for chills, diaphoresis, malaise/fatigue and weight gain  HENT: Negative for nosebleeds and stridor  Eyes: Negative for double vision, vision loss in left eye, vision loss in right eye and visual disturbance  Cardiovascular: Positive for chest pain  Negative for claudication, cyanosis, dyspnea on exertion, irregular heartbeat, leg swelling, near-syncope, orthopnea, palpitations, paroxysmal nocturnal dyspnea and syncope  With left upper extremity radiculopathy symptoms and signs  Respiratory: Negative for cough, shortness of breath, snoring and wheezing  Endocrine: Negative for polydipsia, polyphagia and polyuria  Hematologic/Lymphatic: Negative for bleeding problem  Does not bruise/bleed easily  Skin: Negative for flushing and rash  Musculoskeletal: Negative for falls and myalgias     Gastrointestinal: Negative for abdominal pain, heartburn, hematemesis, hematochezia, melena and nausea  Genitourinary: Negative for hematuria  Neurological: Negative for brief paralysis, dizziness, focal weakness, headaches, light-headedness, loss of balance and vertigo  Psychiatric/Behavioral: Negative for altered mental status and substance abuse  Allergic/Immunologic: Negative for hives  Objective:      /84   Pulse 58   Ht 5' 7" (1 702 m)   Wt (!) 150 kg (330 lb)   BMI 51 69 kg/m²   Physical Exam   Constitutional: He is oriented to person, place, and time  He appears well-developed and well-nourished  No distress  HENT:   Head: Normocephalic and atraumatic  Eyes: Pupils are equal, round, and reactive to light  EOM are normal  No scleral icterus  Neck: Normal range of motion  Neck supple  No JVD present  No thyromegaly present  Cardiovascular: Normal rate, regular rhythm and normal heart sounds  Exam reveals no gallop and no friction rub  No murmur heard  Pulmonary/Chest: Effort normal and breath sounds normal  No stridor  No respiratory distress  He has no wheezes  He has no rales  Abdominal: Soft  Bowel sounds are normal  He exhibits no distension and no mass  There is no tenderness  Musculoskeletal: Normal range of motion  He exhibits no edema or deformity  Neurological: He is alert and oriented to person, place, and time  Coordination normal    Skin: Skin is warm and dry  No erythema  No pallor  Psychiatric: He has a normal mood and affect  His behavior is normal        Lab Review:   Hospital Outpatient Visit on 09/20/2019   Component Date Value    Protocol Name 09/20/2019 GLORY SIT     Time In Exercise Phase 09/20/2019 00:03:00     MAX   SYSTOLIC BP 82/42/1509 237     Max Diastolic Bp 56/53/9365 84     Max Heart Rate 09/20/2019 80     Max Predicted Heart Rate 09/20/2019 162     Reason for Termination 09/20/2019 Test Complete     Test Indication 09/20/2019 EVAL KNOWN CAD     Target Hr Formular 09/20/2019 (220 - Age)*85%     Arrhy During Ex 09/20/2019 none     Chest Pain Statement 09/20/2019 none    Admission on 08/29/2019, Discharged on 08/29/2019   Component Date Value    WBC 08/29/2019 5 60     RBC 08/29/2019 5 14     Hemoglobin 08/29/2019 15 1     Hematocrit 08/29/2019 43 5     MCV 08/29/2019 85     MCH 08/29/2019 29 3     MCHC 08/29/2019 34 7     RDW 08/29/2019 15 2*    MPV 08/29/2019 7 4*    Platelets 96/22/3966 152     Neutrophils Relative 08/29/2019 63     Lymphocytes Relative 08/29/2019 17*    Monocytes Relative 08/29/2019 14*    Eosinophils Relative 08/29/2019 6*    Basophils Relative 08/29/2019 1     Neutrophils Absolute 08/29/2019 3 50     Lymphocytes Absolute 08/29/2019 1 00     Monocytes Absolute 08/29/2019 0 80     Eosinophils Absolute 08/29/2019 0 30     Basophils Absolute 08/29/2019 0 00     Sodium 08/29/2019 138     Potassium 08/29/2019 4 2     Chloride 08/29/2019 104     CO2 08/29/2019 29     ANION GAP 08/29/2019 5     BUN 08/29/2019 18     Creatinine 08/29/2019 0 90     Glucose 08/29/2019 88     Calcium 08/29/2019 9 4     eGFR 08/29/2019 94     Troponin I 08/29/2019 <0 03     D-Dimer, Quant 08/29/2019 172     Protime 08/29/2019 13 1*    INR 08/29/2019 1 13     PTT 08/29/2019 34    Appointment on 08/29/2019   Component Date Value    Ventricular Rate 08/29/2019 61     Atrial Rate 08/29/2019 61     MI Interval 08/29/2019 152     QRSD Interval 08/29/2019 94     QT Interval 08/29/2019 428     QTC Interval 08/29/2019 430     P Axis 08/29/2019 7     QRS Axis 08/29/2019 30     T Wave Axis 08/29/2019 70    Office Visit on 08/29/2019   Component Date Value    Ventricular Rate 08/29/2019 62     Atrial Rate 08/29/2019 62     MI Interval 08/29/2019 146     QRSD Interval 08/29/2019 98     QT Interval 08/29/2019 432     QTC Interval 08/29/2019 438     P Axis 08/29/2019 24     QRS Axis 08/29/2019 23     T Wave Axis 08/29/2019 52      Xr Chest Pa & Lateral    Result Date: 8/29/2019  Narrative: CHEST INDICATION:   R07 9: Chest pain, unspecified  COMPARISON:  Chest x-ray dated February 21, 2019  EXAM PERFORMED/VIEWS:  XR CHEST PA & LATERAL FINDINGS: Cardiomediastinal silhouette appears unremarkable  The lungs are clear  No pneumothorax or pleural effusion  Osseous structures appear within normal limits for patient age  Impression: No acute cardiopulmonary disease  Workstation performed: [id number]     Xr Spine Cervical Complete 4 Or 5 Vw Non Injury    Result Date: 9/12/2019  Narrative: CERVICAL SPINE INDICATION: M54 2: Cervicalgia COMPARISON:  None EXAM PERFORMED/VIEWS:  XR SPINE CERVICAL COMPLETE 4 OR 5 VW NON INJURY IMAGES:  6 FINDINGS: Alignment is unremarkable  Mild disc degenerative narrowing at C5-6 and C6-7  There is no radiographic evidence of acute fracture or destructive osseous lesion  Prevertebral soft tissues appear unremarkable  No significant abnormality identified in the visualized lung apices  Impression: No acute osseous abnormality  Degenerative changes as described  Workstation performed: [id number]         Assessment:       1  Paroxysmal atrial fibrillation (HCC)  Echo complete with contrast if indicated   2  Essential (primary) hypertension  Echo complete with contrast if indicated   3  Precordial pain          Plan:         I suspect he has noncardiac reproducible musculoskeletal chest pain  Advised that heFollow up with you  MRI should likely be considered due to lack of improvement  Heating pad and continuation of present medications recommended  No cardiac specific recommendations other than echocardiogram ordered to confirm preserved LVEF as I disagree with EF calculation on gated SPECT today of 45%  Will keep his previously scheduled appointment

## 2019-09-27 ENCOUNTER — TRANSCRIBE ORDERS (OUTPATIENT)
Dept: ADMINISTRATIVE | Facility: HOSPITAL | Age: 59
End: 2019-09-27

## 2019-09-27 DIAGNOSIS — R07.89 CHEST WALL PAIN: Primary | ICD-10-CM

## 2019-10-03 ENCOUNTER — TELEPHONE (OUTPATIENT)
Dept: VASCULAR SURGERY | Facility: CLINIC | Age: 59
End: 2019-10-03

## 2019-10-04 ENCOUNTER — TELEPHONE (OUTPATIENT)
Dept: CARDIOLOGY CLINIC | Facility: CLINIC | Age: 59
End: 2019-10-04

## 2019-10-04 NOTE — TELEPHONE ENCOUNTER
He is there today to get an injection in his shoulder  He stated that high dose steroids give him a fluttering sensation  Is it ok to proceed? Recs?

## 2019-10-07 ENCOUNTER — APPOINTMENT (EMERGENCY)
Dept: RADIOLOGY | Facility: HOSPITAL | Age: 59
End: 2019-10-07
Payer: COMMERCIAL

## 2019-10-07 ENCOUNTER — APPOINTMENT (EMERGENCY)
Dept: CT IMAGING | Facility: HOSPITAL | Age: 59
End: 2019-10-07
Payer: COMMERCIAL

## 2019-10-07 ENCOUNTER — HOSPITAL ENCOUNTER (OUTPATIENT)
Facility: HOSPITAL | Age: 59
Setting detail: OBSERVATION
Discharge: HOME | End: 2019-10-08
Attending: EMERGENCY MEDICINE | Admitting: INTERNAL MEDICINE
Payer: COMMERCIAL

## 2019-10-07 DIAGNOSIS — M17.0 PRIMARY OSTEOARTHRITIS OF BOTH KNEES: ICD-10-CM

## 2019-10-07 DIAGNOSIS — I20.8 STABLE ANGINA PECTORIS (HCC): Primary | ICD-10-CM

## 2019-10-07 PROBLEM — G47.30 SLEEP APNEA: Chronic | Status: ACTIVE | Noted: 2018-09-10

## 2019-10-07 PROBLEM — G89.29 CHRONIC LEFT SHOULDER PAIN: Chronic | Status: ACTIVE | Noted: 2019-10-07

## 2019-10-07 PROBLEM — R07.9 CHEST PAIN: Chronic | Status: ACTIVE | Noted: 2019-10-07

## 2019-10-07 PROBLEM — I10 ESSENTIAL (PRIMARY) HYPERTENSION: Chronic | Status: ACTIVE | Noted: 2018-09-10

## 2019-10-07 PROBLEM — E66.9 OBESITY: Chronic | Status: ACTIVE | Noted: 2018-09-10

## 2019-10-07 PROBLEM — M25.512 CHRONIC LEFT SHOULDER PAIN: Chronic | Status: ACTIVE | Noted: 2019-10-07

## 2019-10-07 PROBLEM — R07.9 CHEST PAIN: Status: ACTIVE | Noted: 2019-10-07

## 2019-10-07 PROBLEM — I25.10 CORONARY ARTERIOSCLEROSIS IN NATIVE ARTERY: Chronic | Status: ACTIVE | Noted: 2018-09-10

## 2019-10-07 PROBLEM — I48.91 ATRIAL FIBRILLATION (HCC): Chronic | Status: ACTIVE | Noted: 2018-09-10

## 2019-10-07 LAB
ALBUMIN SERPL BCP-MCNC: 4.5 G/DL (ref 3.5–5.7)
ALP SERPL-CCNC: 39 U/L (ref 40–150)
ALT SERPL W P-5'-P-CCNC: 19 U/L (ref 7–52)
ANION GAP SERPL CALCULATED.3IONS-SCNC: 7 MMOL/L (ref 4–13)
AST SERPL W P-5'-P-CCNC: 17 U/L (ref 13–39)
ATRIAL RATE: 69 BPM
BASOPHILS # BLD AUTO: 0 THOUSANDS/ΜL (ref 0–0.1)
BASOPHILS NFR BLD AUTO: 1 % (ref 0–2)
BILIRUB SERPL-MCNC: 0.6 MG/DL (ref 0.2–1)
BUN SERPL-MCNC: 18 MG/DL (ref 7–25)
CALCIUM SERPL-MCNC: 10 MG/DL (ref 8.6–10.5)
CHLORIDE SERPL-SCNC: 102 MMOL/L (ref 98–107)
CO2 SERPL-SCNC: 28 MMOL/L (ref 21–31)
CREAT SERPL-MCNC: 0.78 MG/DL (ref 0.7–1.3)
EOSINOPHIL # BLD AUTO: 0.2 THOUSAND/ΜL (ref 0–0.61)
EOSINOPHIL NFR BLD AUTO: 4 % (ref 0–5)
ERYTHROCYTE [DISTWIDTH] IN BLOOD BY AUTOMATED COUNT: 15.1 % (ref 11.5–14.5)
GFR SERPL CREATININE-BSD FRML MDRD: 99 ML/MIN/1.73SQ M
GLUCOSE SERPL-MCNC: 99 MG/DL (ref 65–99)
HCT VFR BLD AUTO: 48.1 % (ref 42–47)
HGB BLD-MCNC: 16.4 G/DL (ref 14–18)
LYMPHOCYTES # BLD AUTO: 1.1 THOUSANDS/ΜL (ref 0.6–4.47)
LYMPHOCYTES NFR BLD AUTO: 19 % (ref 21–51)
MCH RBC QN AUTO: 29.3 PG (ref 26–34)
MCHC RBC AUTO-ENTMCNC: 34.2 G/DL (ref 31–37)
MCV RBC AUTO: 86 FL (ref 81–99)
MONOCYTES # BLD AUTO: 0.6 THOUSAND/ΜL (ref 0.17–1.22)
MONOCYTES NFR BLD AUTO: 10 % (ref 2–12)
NEUTROPHILS # BLD AUTO: 3.8 THOUSANDS/ΜL (ref 1.4–6.5)
NEUTS SEG NFR BLD AUTO: 66 % (ref 42–75)
P AXIS: 60 DEGREES
PLATELET # BLD AUTO: 166 THOUSANDS/UL (ref 149–390)
PMV BLD AUTO: 7.3 FL (ref 8.6–11.7)
POTASSIUM SERPL-SCNC: 4.2 MMOL/L (ref 3.5–5.5)
PR INTERVAL: 140 MS
PROT SERPL-MCNC: 7.8 G/DL (ref 6.4–8.9)
QRS AXIS: 51 DEGREES
QRSD INTERVAL: 90 MS
QT INTERVAL: 396 MS
QTC INTERVAL: 424 MS
RBC # BLD AUTO: 5.62 MILLION/UL (ref 4.3–5.9)
SODIUM SERPL-SCNC: 137 MMOL/L (ref 134–143)
T WAVE AXIS: 75 DEGREES
TROPONIN I SERPL-MCNC: 0.03 NG/ML
TROPONIN I SERPL-MCNC: <0.03 NG/ML
TSH SERPL DL<=0.05 MIU/L-ACNC: 1.44 UIU/ML (ref 0.45–5.33)
VENTRICULAR RATE: 69 BPM
WBC # BLD AUTO: 5.7 THOUSAND/UL (ref 4.8–10.8)

## 2019-10-07 PROCEDURE — 36415 COLL VENOUS BLD VENIPUNCTURE: CPT | Performed by: EMERGENCY MEDICINE

## 2019-10-07 PROCEDURE — 99285 EMERGENCY DEPT VISIT HI MDM: CPT

## 2019-10-07 PROCEDURE — 71275 CT ANGIOGRAPHY CHEST: CPT

## 2019-10-07 PROCEDURE — 84484 ASSAY OF TROPONIN QUANT: CPT | Performed by: EMERGENCY MEDICINE

## 2019-10-07 PROCEDURE — 96375 TX/PRO/DX INJ NEW DRUG ADDON: CPT

## 2019-10-07 PROCEDURE — 84443 ASSAY THYROID STIM HORMONE: CPT | Performed by: NURSE PRACTITIONER

## 2019-10-07 PROCEDURE — 94760 N-INVAS EAR/PLS OXIMETRY 1: CPT

## 2019-10-07 PROCEDURE — 85025 COMPLETE CBC W/AUTO DIFF WBC: CPT | Performed by: EMERGENCY MEDICINE

## 2019-10-07 PROCEDURE — 71046 X-RAY EXAM CHEST 2 VIEWS: CPT

## 2019-10-07 PROCEDURE — 99220 PR INITIAL OBSERVATION CARE/DAY 70 MINUTES: CPT | Performed by: NURSE PRACTITIONER

## 2019-10-07 PROCEDURE — 96374 THER/PROPH/DIAG INJ IV PUSH: CPT

## 2019-10-07 PROCEDURE — 93010 ELECTROCARDIOGRAM REPORT: CPT | Performed by: INTERNAL MEDICINE

## 2019-10-07 PROCEDURE — 84484 ASSAY OF TROPONIN QUANT: CPT | Performed by: NURSE PRACTITIONER

## 2019-10-07 PROCEDURE — 93005 ELECTROCARDIOGRAM TRACING: CPT

## 2019-10-07 PROCEDURE — 74175 CTA ABDOMEN W/CONTRAST: CPT

## 2019-10-07 PROCEDURE — 80053 COMPREHEN METABOLIC PANEL: CPT | Performed by: EMERGENCY MEDICINE

## 2019-10-07 PROCEDURE — 90682 RIV4 VACC RECOMBINANT DNA IM: CPT | Performed by: HOSPITALIST

## 2019-10-07 RX ORDER — MORPHINE SULFATE 4 MG/ML
4 INJECTION, SOLUTION INTRAMUSCULAR; INTRAVENOUS ONCE
Status: COMPLETED | OUTPATIENT
Start: 2019-10-07 | End: 2019-10-07

## 2019-10-07 RX ORDER — BUSPIRONE HYDROCHLORIDE 5 MG/1
10 TABLET ORAL ONCE
Status: COMPLETED | OUTPATIENT
Start: 2019-10-07 | End: 2019-10-07

## 2019-10-07 RX ORDER — ACETAMINOPHEN 325 MG/1
650 TABLET ORAL EVERY 6 HOURS PRN
Status: DISCONTINUED | OUTPATIENT
Start: 2019-10-07 | End: 2019-10-08 | Stop reason: HOSPADM

## 2019-10-07 RX ORDER — ALBUTEROL SULFATE 90 UG/1
2 AEROSOL, METERED RESPIRATORY (INHALATION) EVERY 6 HOURS PRN
Status: DISCONTINUED | OUTPATIENT
Start: 2019-10-07 | End: 2019-10-08 | Stop reason: HOSPADM

## 2019-10-07 RX ORDER — HEPARIN SODIUM 5000 [USP'U]/ML
5000 INJECTION, SOLUTION INTRAVENOUS; SUBCUTANEOUS EVERY 8 HOURS SCHEDULED
Status: DISCONTINUED | OUTPATIENT
Start: 2019-10-07 | End: 2019-10-08 | Stop reason: HOSPADM

## 2019-10-07 RX ORDER — FLUOXETINE HYDROCHLORIDE 20 MG/1
40 CAPSULE ORAL DAILY
Status: DISCONTINUED | OUTPATIENT
Start: 2019-10-08 | End: 2019-10-08 | Stop reason: HOSPADM

## 2019-10-07 RX ORDER — BUSPIRONE HYDROCHLORIDE 10 MG/1
10 TABLET ORAL 2 TIMES DAILY
Status: DISCONTINUED | OUTPATIENT
Start: 2019-10-07 | End: 2019-10-07

## 2019-10-07 RX ORDER — METHOCARBAMOL 750 MG/1
TABLET, FILM COATED ORAL
COMMUNITY
Start: 2019-09-25 | End: 2019-10-08 | Stop reason: HOSPADM

## 2019-10-07 RX ORDER — AMOXICILLIN 250 MG
1 CAPSULE ORAL 2 TIMES DAILY PRN
COMMUNITY
Start: 2019-09-24 | End: 2020-09-23

## 2019-10-07 RX ORDER — ASPIRIN 325 MG
325 TABLET ORAL DAILY
Status: DISCONTINUED | OUTPATIENT
Start: 2019-10-08 | End: 2019-10-08 | Stop reason: HOSPADM

## 2019-10-07 RX ORDER — BUSPIRONE HYDROCHLORIDE 5 MG/1
TABLET ORAL
COMMUNITY
Start: 2017-12-01

## 2019-10-07 RX ORDER — CYCLOBENZAPRINE HCL 10 MG
10 TABLET ORAL 3 TIMES DAILY PRN
Status: DISCONTINUED | OUTPATIENT
Start: 2019-10-07 | End: 2019-10-08 | Stop reason: HOSPADM

## 2019-10-07 RX ORDER — NAPROXEN 500 MG/1
500 TABLET ORAL
COMMUNITY
End: 2020-02-10

## 2019-10-07 RX ORDER — ZOLPIDEM TARTRATE 5 MG/1
5 TABLET ORAL
Status: DISCONTINUED | OUTPATIENT
Start: 2019-10-07 | End: 2019-10-08 | Stop reason: HOSPADM

## 2019-10-07 RX ORDER — DIAZEPAM 5 MG/ML
2.5 INJECTION, SOLUTION INTRAMUSCULAR; INTRAVENOUS ONCE
Status: COMPLETED | OUTPATIENT
Start: 2019-10-07 | End: 2019-10-07

## 2019-10-07 RX ORDER — ALPRAZOLAM 0.5 MG/1
1 TABLET ORAL
Status: DISCONTINUED | OUTPATIENT
Start: 2019-10-07 | End: 2019-10-08 | Stop reason: HOSPADM

## 2019-10-07 RX ORDER — DIGOXIN 250 MCG
TABLET ORAL
COMMUNITY
End: 2020-02-10

## 2019-10-07 RX ORDER — ONDANSETRON 2 MG/ML
4 INJECTION INTRAMUSCULAR; INTRAVENOUS EVERY 6 HOURS PRN
Status: DISCONTINUED | OUTPATIENT
Start: 2019-10-07 | End: 2019-10-08 | Stop reason: HOSPADM

## 2019-10-07 RX ORDER — ASPIRIN 81 MG/1
324 TABLET, CHEWABLE ORAL ONCE
Status: COMPLETED | OUTPATIENT
Start: 2019-10-07 | End: 2019-10-07

## 2019-10-07 RX ORDER — METOPROLOL SUCCINATE 50 MG/1
50 TABLET, EXTENDED RELEASE ORAL DAILY
Status: DISCONTINUED | OUTPATIENT
Start: 2019-10-08 | End: 2019-10-08 | Stop reason: HOSPADM

## 2019-10-07 RX ORDER — 0.9 % SODIUM CHLORIDE 0.9 %
3 VIAL (ML) INJECTION AS NEEDED
Status: DISCONTINUED | OUTPATIENT
Start: 2019-10-07 | End: 2019-10-08 | Stop reason: HOSPADM

## 2019-10-07 RX ORDER — BUSPIRONE HYDROCHLORIDE 5 MG/1
5 TABLET ORAL EVERY EVENING
Status: DISCONTINUED | OUTPATIENT
Start: 2019-10-07 | End: 2019-10-08 | Stop reason: HOSPADM

## 2019-10-07 RX ORDER — BUSPIRONE HYDROCHLORIDE 5 MG/1
10 TABLET ORAL DAILY
Status: DISCONTINUED | OUTPATIENT
Start: 2019-10-08 | End: 2019-10-08 | Stop reason: HOSPADM

## 2019-10-07 RX ORDER — NITROGLYCERIN 0.4 MG/1
0.4 TABLET SUBLINGUAL
Status: DISCONTINUED | OUTPATIENT
Start: 2019-10-07 | End: 2019-10-08 | Stop reason: HOSPADM

## 2019-10-07 RX ADMIN — HEPARIN SODIUM 5000 UNITS: 5000 INJECTION INTRAVENOUS; SUBCUTANEOUS at 22:59

## 2019-10-07 RX ADMIN — ASPIRIN 81 MG 324 MG: 81 TABLET ORAL at 10:47

## 2019-10-07 RX ADMIN — IOHEXOL 100 ML: 350 INJECTION, SOLUTION INTRAVENOUS at 13:58

## 2019-10-07 RX ADMIN — INFLUENZA A VIRUS A/BRISBANE/02/2018 (H1N1) RECOMBINANT HEMAGGLUTININ ANTIGEN, INFLUENZA A VIRUS A/KANSAS/14/2017 (H3N2) RECOMBINANT HEMAGGLUTININ ANTIGEN, INFLUENZA B VIRUS B/PHUKET/3073/2013 RECOMBINANT HEMAGGLUTININ ANTIGEN, AND INFLUENZA B VIRUS B/MARYLAND/15/2016 RECOMBINANT HEMAGGLUTININ ANTIGEN 0.5 ML: 45; 45; 45; 45 INJECTION INTRAMUSCULAR at 18:18

## 2019-10-07 RX ADMIN — BUSPIRONE HYDROCHLORIDE 5 MG: 5 TABLET ORAL at 18:09

## 2019-10-07 RX ADMIN — MORPHINE SULFATE 2 MG: 2 INJECTION, SOLUTION INTRAMUSCULAR; INTRAVENOUS at 20:12

## 2019-10-07 RX ADMIN — HEPARIN SODIUM 5000 UNITS: 5000 INJECTION INTRAVENOUS; SUBCUTANEOUS at 18:08

## 2019-10-07 RX ADMIN — ALPRAZOLAM 1 MG: 0.5 TABLET ORAL at 22:59

## 2019-10-07 RX ADMIN — MORPHINE SULFATE 4 MG: 4 INJECTION INTRAVENOUS at 14:59

## 2019-10-07 RX ADMIN — BUSPIRONE HYDROCHLORIDE 10 MG: 5 TABLET ORAL at 12:56

## 2019-10-07 RX ADMIN — DIAZEPAM 2.5 MG: 5 INJECTION, SOLUTION INTRAMUSCULAR; INTRAVENOUS at 15:39

## 2019-10-07 NOTE — ASSESSMENT & PLAN NOTE
· Has been on and off for approximately 2 weeks  · Worsened 1 hour after left shoulder cortisone injection  · Has been seen in emergency department x2 with similar complaints  · Trend troponins  · Consult Cardiology  · EKG and echocardiogram ordered  · Monitor on telemetry  · Check TSH and lipids

## 2019-10-07 NOTE — ED PROVIDER NOTES
History  Chief Complaint   Patient presents with    Chest Pain     substernal, 1 hour ago post cortisone shot, 8/10 burning, non-reproducible     54-year-old male presenting with substernal chest pain after he received a cortisone shot in his left shoulder today  Patient has been experiencing intermittent chest pain for the past 2 weeks, seen in the ED twice over that period of time and both workups were negative as per patient  States the pain today is similar than prior pain  Chest Pain   Pain location:  Substernal area  Pain quality: aching and burning    Pain radiates to:  Does not radiate  Pain radiates to the back: no    Pain severity:  Moderate  Onset quality:  Gradual  Duration:  2 weeks  Timing:  Constant  Progression:  Waxing and waning  Context: movement, raising an arm and at rest    Context: not breathing, not lifting and no trauma    Relieved by:  Nothing  Associated symptoms: heartburn    Associated symptoms: no abdominal pain, no altered mental status, no back pain, no cough, no dizziness, no fatigue, no fever, no headache, no lower extremity edema, no nausea, no near-syncope, no numbness, no palpitations, no shortness of breath, no syncope, not vomiting and no weakness        Prior to Admission Medications   Prescriptions Last Dose Informant Patient Reported? Taking?    ALPRAZolam (XANAX) 1 mg tablet 10/7/2019 at Unknown time  Yes Yes   Sig: Take 1 mg by mouth daily at bedtime   FLUoxetine (PROzac) 20 mg capsule 10/7/2019 at Unknown time  Yes Yes   Sig: Take 40 mg by mouth daily In the morning   albuterol (VENTOLIN HFA) 90 mcg/act inhaler Not Taking at Unknown time  No No   Sig: Inhale 2 puffs every 6 (six) hours as needed for wheezing   Patient not taking: Reported on 9/20/2019   aspirin (ECOTRIN) 325 mg EC tablet Not Taking at Unknown time  Yes No   Sig: Take 325 mg by mouth   aspirin 325 mg tablet 10/7/2019 at Unknown time  Yes Yes   Sig: Take 325 mg by mouth daily   busPIRone (BUSPAR) 5 mg tablet Not Taking at Unknown time  Yes No   Sig: take 2 tablets in the morning and 1 tablet at night   busPIRone (BUSPAR) 5 mg tablet 10/7/2019 at Unknown time  Yes Yes   Sig: take 2 tablets by oral route in the morning and 1 tablet in the evening  digoxin (LANOXIN) 0 25 mg tablet Not Taking at Unknown time  Yes No   Sig: digoxin 250 mcg (0 25 mg) tablet   ibuprofen (MOTRIN) 600 mg tablet 10/6/2019 at Unknown time  No Yes   Sig: Take 1 tablet (600 mg total) by mouth every 6 (six) hours as needed for mild pain or moderate pain for up to 20 doses   methocarbamol (ROBAXIN) 750 mg tablet Past Week at Unknown time  Yes Yes   metoprolol succinate (TOPROL-XL) 50 mg 24 hr tablet 10/6/2019 at Unknown time  No Yes   Sig: Take 1 tablet (50 mg total) by mouth daily   naproxen (NAPROSYN) 500 mg tablet Not Taking at Unknown time  Yes No   Sig: Take 500 mg by mouth   senna-docusate sodium (SENOKOT S) 8 6-50 mg per tablet Not Taking at Unknown time  Yes No   Sig: Take 1 tablet by mouth 2 (two) times a day as needed   zolpidem (AMBIEN) 5 mg tablet 10/6/2019 at Unknown time  Yes Yes   Sig: Take 5 mg by mouth daily at bedtime as needed for sleep  Facility-Administered Medications: None       Past Medical History:   Diagnosis Date    Anxiety     Arthritis     Athscl heart disease of native coronary artery w/o ang pctrs     Atrial fibrillation ([address] ) 2012 and 2016    CPAP (continuous positive airway pressure) dependence     Diverticulitis     Edema     Legs and hands    Essential tremor     Hard of hearing     Hx of cardiovascular stress test 04/28/2016    EF0 50 (50%) normal LVSF  No evidence for prior ischemia or MI    Hx of echocardiogram 05/18/2016    EF0 55 (55%) severely technically limited suboptimal study  No significant valvular insufficiency or stenosis   / EF0 60 (60%) Trace MR and TR 6/22/17  / EF0 65 (65%) technically difficult study  no hemodynamically significant valve disease identified   11/30/17  Hypercholesteremia     Hypertension     Mixed hyperlipidemia     Morbid obesity ([facility] [address] )     Obesity     Panic attacks     Rash     On extremeties and chest  Chronic, recurrent    Sleep apnea     Varicose veins of left lower extremity     Bilateral       Past Surgical History:   Procedure Laterality Date    ABLATION RADIO FREQUENCY ATRIAL (MAZE/CRYOABLATION)  02/08/2018    Cryoablation lesion summary, 10 applications were delivered   CARDIAC CATHETERIZATION  11/2012    No sig CAD  6/26/17    COLONOSCOPY      JOINT REPLACEMENT Bilateral     knees    DC TOTAL KNEE ARTHROPLASTY Bilateral 6/1/2016    Procedure: ARTHROPLASTY KNEE TOTAL BILATERAL;  Surgeon: Kyra Cotton MD;  Location: [facility];  Service: Orthopedics       Family History   Problem Relation Age of Onset    Coronary artery disease Family         less than 61 yrs of age   Sunita Sheppards Heart attack Mother         MI    Heart attack Sister         MI    Heart attack Brother         MI     I have reviewed and agree with the history as documented  Social History     Tobacco Use    Smoking status: Former Smoker     Packs/day: 1 00     Years: 18 00     Pack years: 18 00     Types: Cigarettes    Smokeless tobacco: Never Used    Tobacco comment: Quit 21 years ago   Substance Use Topics    Alcohol use: Yes     Comment: socially    Drug use: No        Review of Systems   Constitutional: Negative for fatigue and fever  HENT: Negative for rhinorrhea  Eyes: Negative for visual disturbance  Respiratory: Negative for cough and shortness of breath  Cardiovascular: Positive for chest pain  Negative for palpitations, syncope and near-syncope  Gastrointestinal: Positive for heartburn  Negative for abdominal pain, nausea and vomiting  Genitourinary: Negative for hematuria  Musculoskeletal: Negative for back pain  Skin: Negative for rash  Neurological: Negative for dizziness, weakness, numbness and headaches     Psychiatric/Behavioral: Negative for hallucinations  Physical Exam  Physical Exam   Constitutional: He is oriented to person, place, and time  He appears well-developed and well-nourished  He does not appear ill  No distress  HENT:   Head: Normocephalic and atraumatic  Right Ear: External ear normal    Left Ear: External ear normal    Eyes: Pupils are equal, round, and reactive to light  Conjunctivae and EOM are normal    Neck: Normal range of motion  Neck supple  No JVD present  Cardiovascular: Normal rate, regular rhythm, normal heart sounds, intact distal pulses and normal pulses  No murmur heard  Pulmonary/Chest: Effort normal and breath sounds normal  No respiratory distress  He has no wheezes  He has no rales  Abdominal: Soft  Bowel sounds are normal  There is no tenderness  There is no guarding  Musculoskeletal: He exhibits no edema  Right lower leg: Normal         Left lower leg: Normal    Range of motion of the left upper extremity is mildly decreased   Neurological: He is alert and oriented to person, place, and time  No cranial nerve deficit  Strength is 5/5 in all 4 extremities  Sensation to light touch intact in all 4 distal extremities     Skin: Skin is warm and dry  Capillary refill takes less than 2 seconds  Psychiatric: He has a normal mood and affect  Nursing note and vitals reviewed        Vital Signs  ED Triage Vitals   Temperature Pulse Respirations Blood Pressure SpO2   10/07/19 1031 10/07/19 1031 10/07/19 1031 10/07/19 1031 10/07/19 1031   98 5 °F (36 9 °C) 65 22 164/90 97 %      Temp Source Heart Rate Source Patient Position - Orthostatic VS BP Location FiO2 (%)   10/07/19 1657 10/07/19 1031 10/07/19 1031 10/07/19 1031 --   Temporal Monitor Lying Right arm       Pain Score       10/07/19 1031       8           Vitals:    10/07/19 1657 10/07/19 2004 10/07/19 2356 10/08/19 0324   BP: 162/98 129/69 126/81 115/56   Pulse: 59 75 76 60   Patient Position - Orthostatic VS: Lying Lying Lying Lying         Visual Acuity      ED Medications  Medications   sodium chloride (PF) 0 9 % injection 3 mL (has no administration in time range)   ALPRAZolam (XANAX) tablet 1 mg (1 mg Oral Given 10/7/19 2259)   aspirin tablet 325 mg (has no administration in time range)   busPIRone (BUSPAR) tablet 10 mg (has no administration in time range)   busPIRone (BUSPAR) tablet 5 mg (5 mg Oral Given 10/7/19 1809)   FLUoxetine (PROzac) capsule 40 mg (has no administration in time range)   metoprolol succinate (TOPROL-XL) 24 hr tablet 50 mg (has no administration in time range)   zolpidem (AMBIEN) tablet 5 mg (has no administration in time range)   ondansetron (ZOFRAN) injection 4 mg (has no administration in time range)   nitroglycerin (NITROSTAT) SL tablet 0 4 mg (has no administration in time range)   heparin (porcine) subcutaneous injection 5,000 Units (5,000 Units Subcutaneous Given 10/8/19 0522)   acetaminophen (TYLENOL) tablet 650 mg (has no administration in time range)   albuterol (PROVENTIL HFA,VENTOLIN HFA) inhaler 2 puff (has no administration in time range)   cyclobenzaprine (FLEXERIL) tablet 10 mg (has no administration in time range)   morphine injection 2 mg (2 mg Intravenous Given 10/8/19 0522)   aspirin chewable tablet 324 mg (324 mg Oral Given 10/7/19 1047)   busPIRone (BUSPAR) tablet 10 mg (10 mg Oral Given 10/7/19 1256)   iohexol (OMNIPAQUE) 350 MG/ML injection (MULTI-DOSE) 100 mL (100 mL Intravenous Given 10/7/19 1358)   morphine (PF) 4 mg/mL injection 4 mg (4 mg Intravenous Given 10/7/19 1459)   diazepam (VALIUM) injection 2 5 mg (2 5 mg Intravenous Given 10/7/19 1539)   influenza vaccine, recombinant, quadrivalent (FLUBLOK) IM injection 0 5 mL (0 5 mL Intramuscular Given 10/7/19 1818)       Diagnostic Studies  Results Reviewed     Procedure Component Value Units Date/Time    Troponin I [[id number]]  (Normal) Collected:  10/07/19 1344    Lab Status:  Final result Specimen:  Blood from Arm, Right Updated: 10/07/19 1409     Troponin I <0 03 ng/mL     Comprehensive metabolic panel [[id number]]  (Abnormal) Collected:  10/07/19 1025    Lab Status:  Final result Specimen:  Blood from Arm, Left Updated:  10/07/19 1059     Sodium 137 mmol/L      Potassium 4 2 mmol/L      Chloride 102 mmol/L      CO2 28 mmol/L      ANION GAP 7 mmol/L      BUN 18 mg/dL      Creatinine 0 78 mg/dL      Glucose 99 mg/dL      Calcium 10 0 mg/dL      AST 17 U/L      ALT 19 U/L      Alkaline Phosphatase 39 U/L      Total Protein 7 8 g/dL      Albumin 4 5 g/dL      Total Bilirubin 0 60 mg/dL      eGFR 99 ml/min/1 73sq m     Narrative:       National Kidney Disease Foundation guidelines for Chronic Kidney Disease (CKD):     Stage 1 with normal or high GFR (GFR > 90 mL/min/1 73 square meters)    Stage 2 Mild CKD (GFR = 60-89 mL/min/1 73 square meters)    Stage 3A Moderate CKD (GFR = 45-59 mL/min/1 73 square meters)    Stage 3B Moderate CKD (GFR = 30-44 mL/min/1 73 square meters)    Stage 4 Severe CKD (GFR = 15-29 mL/min/1 73 square meters)    Stage 5 End Stage CKD (GFR <15 mL/min/1 73 square meters)  Note: GFR calculation is accurate only with a steady state creatinine    Troponin I [[id number]]  (Normal) Collected:  10/07/19 1025    Lab Status:  Final result Specimen:  Blood from Arm, Left Updated:  10/07/19 1055     Troponin I 0 03 ng/mL     CBC and differential [[id number]]  (Abnormal) Collected:  10/07/19 1025    Lab Status:  Final result Specimen:  Blood from Arm, Left Updated:  10/07/19 1038     WBC 5 70 Thousand/uL      RBC 5 62 Million/uL      Hemoglobin 16 4 g/dL      Hematocrit 48 1 %      MCV 86 fL      MCH 29 3 pg      MCHC 34 2 g/dL      RDW 15 1 %      MPV 7 3 fL      Platelets 334 Thousands/uL      Neutrophils Relative 66 %      Lymphocytes Relative 19 %      Monocytes Relative 10 %      Eosinophils Relative 4 %      Basophils Relative 1 %      Neutrophils Absolute 3 80 Thousands/µL      Lymphocytes Absolute 1 10 Thousands/µL Monocytes Absolute 0 60 Thousand/µL      Eosinophils Absolute 0 20 Thousand/µL      Basophils Absolute 0 00 Thousands/µL                  CTA dissection protocol chest and abdomen   Final Result by Eliot De Guzman MD (10/07 2411)   1  Ascending aortic aneurysm measuring up to 4 7 cm at the level the right pulmonary artery  No evidence of aortic dissection  2   Aberrant takeoff of the right subclavian artery, a normal variant  3   4 mm right lower lobe pulmonary nodule unchanged from 2/21/2019  Based on current Fleischner Society 2017 Guidelines on incidental pulmonary nodule, no routine follow-up is needed if the patient is considered low risk for lung cancer  If the patient    is considered high risk for lung cancer, 12 month follow-up non-contrast chest CT is recommended  4   Hepatic steatosis  The study was marked in EPIC for significant notification  Workstation performed: [id number]         X-ray chest 2 views   Final Result by Tonio Payne MD (10/07 4483)      No acute cardiopulmonary disease  Workstation performed: [id number]         MRI inpatient order    (Results Pending)              Procedures  Procedures       ED Course                               MDM  Number of Diagnoses or Management Options  Diagnosis management comments: 1:30 p m :  Patient with negative troponin  No acute EKG ischemic changes  Second set of troponin pending  Pain had subsided initially but has returned  Patient now complaining of pain radiating to his back  CT angio to rule out dissection has been ordered  Offered pain medication but patient declined  4:15 p m :  Patient has CTA was negative for dissection, however incidentally an aneurysm was noted  Repeat EKG also without any ischemic changes  Second troponin was negative  Patient still endorsing intermittent chest pain  Due to this, patient was placed in observation with telemetry    I discussed with Sujey Solorzano who agrees to admit on behalf of Dr Eliza Talley  Patient is agreeable to plan  Amount and/or Complexity of Data Reviewed  Clinical lab tests: ordered and reviewed  Tests in the radiology section of CPT®: ordered and reviewed  Tests in the medicine section of CPT®: ordered and reviewed  Decide to obtain previous medical records or to obtain history from someone other than the patient: yes  Obtain history from someone other than the patient: yes  Review and summarize past medical records: yes  Discuss the patient with other providers: yes  Independent visualization of images, tracings, or specimens: yes    Risk of Complications, Morbidity, and/or Mortality  Presenting problems: high  Diagnostic procedures: moderate  Management options: moderate    Patient Progress  Patient progress: stable      Disposition  Final diagnoses:   Stable angina pectoris ([address] )     Time reflects when diagnosis was documented in both MDM as applicable and the Disposition within this note     Time User Action Codes Description Comment    10/7/2019  4:20 PM Whitley Silva Add [I20 8] Stable angina pectoris ([facility] [address] )     10/7/2019  4:20 PM Keeley Silva Modify [I20 8] Stable angina pectoris [facility])       ED Disposition     ED Disposition Condition Date/Time Comment    Admit Stable Mon Oct 7, 2019  4:15 PM Case was discussed with Karina and the patient's admission status was agreed to be Admission Status: observation status to the service of Dr Eliza Talley   Follow-up Information    None         Current Discharge Medication List      CONTINUE these medications which have NOT CHANGED    Details   ALPRAZolam (XANAX) 1 mg tablet Take 1 mg by mouth daily at bedtime      aspirin 325 mg tablet Take 325 mg by mouth daily      !! busPIRone (BUSPAR) 5 mg tablet take 2 tablets by oral route in the morning and 1 tablet in the evening        FLUoxetine (PROzac) 20 mg capsule Take 40 mg by mouth daily In the morning      ibuprofen (MOTRIN) 600 mg tablet Take 1 tablet (600 mg total) by mouth every 6 (six) hours as needed for mild pain or moderate pain for up to 20 doses  Qty: 20 tablet, Refills: 0    Associated Diagnoses: Atypical chest pain      methocarbamol (ROBAXIN) 750 mg tablet       metoprolol succinate (TOPROL-XL) 50 mg 24 hr tablet Take 1 tablet (50 mg total) by mouth daily  Qty: 30 tablet, Refills: 5    Associated Diagnoses: Paroxysmal atrial fibrillation (HCC)      zolpidem (AMBIEN) 5 mg tablet Take 5 mg by mouth daily at bedtime as needed for sleep  albuterol (VENTOLIN HFA) 90 mcg/act inhaler Inhale 2 puffs every 6 (six) hours as needed for wheezing  Qty: 18 g, Refills: 0    Associated Diagnoses: Cough      aspirin (ECOTRIN) 325 mg EC tablet Take 325 mg by mouth      !! busPIRone (BUSPAR) 5 mg tablet take 2 tablets in the morning and 1 tablet at night      digoxin (LANOXIN) 0 25 mg tablet digoxin 250 mcg (0 25 mg) tablet      naproxen (NAPROSYN) 500 mg tablet Take 500 mg by mouth      senna-docusate sodium (SENOKOT S) 8 6-50 mg per tablet Take 1 tablet by mouth 2 (two) times a day as needed       !! - Potential duplicate medications found  Please discuss with provider  No discharge procedures on file      ED Provider  Electronically Signed by           Lindy Palumbo DO  10/08/19 0067

## 2019-10-07 NOTE — H&P
H&P- Korin Collins 1960, 61 y o  male MRN: [id number]    Unit/Bed#: -02 Encounter: [id number]    Primary Care Provider: Betty Vargas DO   Date and time admitted to hospital: 10/7/2019 10:13 AM        * Chest pain  Assessment & Plan  · Has been on and off for approximately 2 weeks  · Worsened 1 hour after left shoulder cortisone injection  · Has been seen in emergency department x2 with similar complaints  · Trend troponins  · Consult Cardiology  · EKG and echocardiogram ordered  · Monitor on telemetry  · Check TSH and lipids    Chronic left shoulder pain  Assessment & Plan  · Patient is current with pain management  · Received injection into left shoulder 1 hour prior to having onset of chest pain  · Patient states he had discontinued his Xanax, BuSpar for 48 hours secondary to cortisone injection  · Will order patient Flexeril and morphine for pain    Atrial fibrillation ([facility] [address] )  Assessment & Plan  · Not on any chronic anticoagulation  · Continue Toprol XL    Coronary arteriosclerosis in native artery  Assessment & Plan  · Continue aspirin    Essential (primary) hypertension  Assessment & Plan  · Continue Toprol XL    Obesity  Assessment & Plan  · Supportive care  · BMI is 50 72    Sleep apnea  Assessment & Plan  · Patient uses CPAP - setting of 14    VTE Prophylaxis: Heparin  Code Status: full code  POLST: POLST is not applicable to this patient  Discussion with family: discussed with patient and his daughter at the bedside    Anticipated Length of Stay:  Patient will be admitted on an Observation basis with an anticipated length of stay of  < 2 midnights  Justification for Hospital Stay: trend troponins and have consultation with cardiology    Chief Complaint:   Substernal chest pain    History of Present Illness:    Korin Collins is a 61 y o  male who presents with substernal chest pain  Patient has had on and off intermittent chest pain for approximately 2 weeks    He did believe that it had been associated with his left shoulder pain  Patient was at pain management on the day of admission for a cortisone injection into his left shoulder  Patient's daughter stated that the patient had stopped taking his Xanax and BuSpar for 48 hours prior to his injection of cortisone  The daughter states that she believes this was more of an anxiety attack more than any type of chest discomfort or chest pain issue  The patient did have a left shoulder cortisone injection without any significant relief  The patient states that he had initially substernal chest pain however now the pain is more localized over his left pectoral muscle  He states that he feels that this is related to muscle pain more than heart pain  Patient does rate this left pectoral muscle pain an 8/10  Patient states that he has had Robaxin in the past that did not have any effectiveness, he has taken Flexeril in the past which did help minimally  Review of Systems:  Review of Systems   Musculoskeletal: Positive for back pain and neck pain  All other systems reviewed and are negative  Past Medical and Surgical History:   Past Medical History:   Diagnosis Date    Anxiety     Arthritis     Athscl heart disease of native coronary artery w/o ang pctrs     Atrial fibrillation ([facility] [address] ) 2012 and 2016    CPAP (continuous positive airway pressure) dependence     Diverticulitis     Edema     Legs and hands    Essential tremor     Hard of hearing     Hx of cardiovascular stress test 04/28/2016    EF0 50 (50%) normal LVSF  No evidence for prior ischemia or MI    Hx of echocardiogram 05/18/2016    EF0 55 (55%) severely technically limited suboptimal study  No significant valvular insufficiency or stenosis   / EF0 60 (60%) Trace MR and TR 6/22/17  / EF0 65 (65%) technically difficult study  no hemodynamically significant valve disease identified   11/30/17      Hypercholesteremia     Hypertension     Mixed hyperlipidemia     Morbid obesity ([facility] [address] )     Obesity     Panic attacks     Rash     On extremeties and chest  Chronic, recurrent    Sleep apnea     Varicose veins of left lower extremity     Bilateral       Past Surgical History:   Procedure Laterality Date    ABLATION RADIO FREQUENCY ATRIAL (MAZE/CRYOABLATION)  02/08/2018    Cryoablation lesion summary, 10 applications were delivered   CARDIAC CATHETERIZATION  11/2012    No sig CAD  6/26/17    COLONOSCOPY      JOINT REPLACEMENT Bilateral     knees    WI TOTAL KNEE ARTHROPLASTY Bilateral 6/1/2016    Procedure: ARTHROPLASTY KNEE TOTAL BILATERAL;  Surgeon: Devika Marie MD;  Location: [facility];  Service: Orthopedics       Meds/Allergies:  Prior to Admission medications    Medication Sig Start Date End Date Taking? Authorizing Provider   albuterol (VENTOLIN HFA) 90 mcg/act inhaler Inhale 2 puffs every 6 (six) hours as needed for wheezing  Patient not taking: Reported on 9/20/2019 1/17/19   Brittany Fortune PA-C   ALPRAZolam Iris Dill) 1 mg tablet Take 1 mg by mouth daily at bedtime    Historical Provider, MD   aspirin 325 mg tablet Take 325 mg by mouth daily    Historical Provider, MD   busPIRone (BUSPAR) 5 mg tablet take 2 tablets in the morning and 1 tablet at night    Historical Provider, MD   FLUoxetine (PROzac) 20 mg capsule Take 40 mg by mouth daily In the morning    Historical Provider, MD   ibuprofen (MOTRIN) 600 mg tablet Take 1 tablet (600 mg total) by mouth every 6 (six) hours as needed for mild pain or moderate pain for up to 20 doses 8/29/19   Wilder Gelineau, DO   metoprolol succinate (TOPROL-XL) 50 mg 24 hr tablet Take 1 tablet (50 mg total) by mouth daily 7/15/19   Sydnie Must, DO   zolpidem (AMBIEN) 5 mg tablet Take 5 mg by mouth daily at bedtime as needed for sleep  Historical Provider, MD     I have reviewed home medications with patient personally  Allergies:    Allergies   Allergen Reactions    Benadryl [Diphenhydramine] Other (See Comments)     Tachycardia Social History:  Marital Status: /Civil Union   Occupation:  Retired  Patient Pre-hospital Living Situation:  At home  Patient Pre-hospital Level of Mobility:  Function  Patient Pre-hospital Diet Restrictions:  None  Substance Use History:   Social History     Substance and Sexual Activity   Alcohol Use Yes    Comment: socially     Social History     Tobacco Use   Smoking Status Former Smoker    Packs/day: 1 00    Years: 18 00    Pack years: 18 00    Types: Cigarettes   Smokeless Tobacco Never Used   Tobacco Comment    Quit 21 years ago     Social History     Substance and Sexual Activity   Drug Use No       Family History:  I have reviewed the patients family history    Physical Exam:   Vitals:   Blood Pressure: 162/98 (10/07/19 1657)  Pulse: 59 (10/07/19 1657)  Temperature: 97 9 °F (36 6 °C) (10/07/19 1657)  Temp Source: Temporal (10/07/19 1657)  Respirations: 22 (10/07/19 1657)  Height: 5' 8" (172 7 cm) (10/07/19 1657)  Weight - Scale: (!) 151 kg (333 lb 8 9 oz) (10/07/19 1657)  SpO2: 96 % (10/07/19 1657)    Physical Exam   Constitutional: He is oriented to person, place, and time  Vital signs are normal  He appears well-developed and well-nourished  He is cooperative  HENT:   Head: Normocephalic and atraumatic  Nose: Nose normal    Mouth/Throat: Oropharynx is clear and moist and mucous membranes are normal    Eyes: Conjunctivae and EOM are normal    Neck: Decreased range of motion present  Cardiovascular: Normal rate, regular rhythm, normal heart sounds and normal pulses  Pulmonary/Chest: Effort normal and breath sounds normal    Abdominal: Soft  Normal appearance and bowel sounds are normal    Musculoskeletal:        Left shoulder: He exhibits decreased range of motion, tenderness, pain and spasm  Cervical back: He exhibits decreased range of motion, tenderness, pain and spasm  Back:         Arms:  Neurological: He is alert and oriented to person, place, and time  Skin: Skin is warm and dry  Psychiatric: He has a normal mood and affect  His speech is normal and behavior is normal    Patient does have a central tremor of right hand       Additional Data:   Lab Results: I have personally reviewed pertinent reports  Results from last 7 days   Lab Units 10/07/19  1025   WBC Thousand/uL 5 70   HEMOGLOBIN g/dL 16 4   HEMATOCRIT % 48 1*   PLATELETS Thousands/uL 166   NEUTROS PCT % 66   LYMPHS PCT % 19*   MONOS PCT % 10   EOS PCT % 4     Results from last 7 days   Lab Units 10/07/19  1025   POTASSIUM mmol/L 4 2   CHLORIDE mmol/L 102   CO2 mmol/L 28   BUN mg/dL 18   CREATININE mg/dL 0 78   CALCIUM mg/dL 10 0   ALK PHOS U/L 39*   ALT U/L 19   AST U/L 17                   Imaging: I have personally reviewed pertinent reports  CTA dissection protocol chest and abdomen   Final Result by Sharyle Plater, MD (10/07 4249)   1  Ascending aortic aneurysm measuring up to 4 7 cm at the level the right pulmonary artery  No evidence of aortic dissection  2   Aberrant takeoff of the right subclavian artery, a normal variant  3   4 mm right lower lobe pulmonary nodule unchanged from 2/21/2019  Based on current Fleischner Society 2017 Guidelines on incidental pulmonary nodule, no routine follow-up is needed if the patient is considered low risk for lung cancer  If the patient    is considered high risk for lung cancer, 12 month follow-up non-contrast chest CT is recommended  4   Hepatic steatosis  The study was marked in EPIC for significant notification  Workstation performed: [id number]         X-ray chest 2 views   Final Result by Janie Ahmadi MD (10/07 0113)      No acute cardiopulmonary disease              Workstation performed: [id number]             EKG, Pathology, and Other Studies Reviewed on Admission:   · EKG:  Normal sinus rhythm, heart rate 66    [facility] / [facility] Records Reviewed: Yes     ** Please Note: This note has been constructed using a voice recognition system   **

## 2019-10-07 NOTE — PLAN OF CARE
Problem: PAIN - ADULT  Goal: Verbalizes/displays adequate comfort level or baseline comfort level  Description  Interventions:  - Encourage patient to monitor pain and request assistance  - Assess pain using appropriate pain scale  - Administer analgesics based on type and severity of pain and evaluate response  - Implement non-pharmacological measures as appropriate and evaluate response  - Consider cultural and social influences on pain and pain management  - Notify physician/advanced practitioner if interventions unsuccessful or patient reports new pain  Outcome: Progressing     Problem: SAFETY ADULT  Goal: Patient will remain free of falls  Description  INTERVENTIONS:  - Assess patient frequently for physical needs  -  Identify cognitive and physical deficits and behaviors that affect risk of falls    -  [place] fall precautions as indicated by assessment   - Educate patient/family on patient safety including physical limitations  - Instruct patient to call for assistance with activity based on assessment  - Modify environment to reduce risk of injury  - Consider OT/PT consult to assist with strengthening/mobility  Outcome: Progressing  Goal: Maintain or return to baseline ADL function  Description  INTERVENTIONS:  -  Assess patient's ability to carry out ADLs; assess patient's baseline for ADL function and identify physical deficits which impact ability to perform ADLs (bathing, care of mouth/teeth, toileting, grooming, dressing, etc )  - Assess/evaluate cause of self-care deficits   - Assess range of motion  - Assess patient's mobility; develop plan if impaired  - Assess patient's need for assistive devices and provide as appropriate  - Encourage maximum independence but intervene and supervise when necessary  - Involve family in performance of ADLs  - Assess for home care needs following discharge   - Consider OT consult to assist with ADL evaluation and planning for discharge  - Provide patient education as appropriate  Outcome: Progressing  Goal: Maintain or return mobility status to optimal level  Description  INTERVENTIONS:  - Assess patient's baseline mobility status (ambulation, transfers, stairs, etc )    - Identify cognitive and physical deficits and behaviors that affect mobility  - Identify mobility aids required to assist with transfers and/or ambulation (gait belt, sit-to-stand, lift, walker, cane, etc )  - [place] fall precautions as indicated by assessment  - Record patient progress and toleration of activity level on Mobility SBAR; progress patient to next Phase/Stage  - Instruct patient to call for assistance with activity based on assessment  - Consider rehabilitation consult to assist with strengthening/weightbearing, etc   Outcome: Progressing     Problem: DISCHARGE PLANNING  Goal: Discharge to home or other facility with appropriate resources  Description  INTERVENTIONS:  - Identify barriers to discharge w/patient and caregiver  - Arrange for needed discharge resources and transportation as appropriate  - Identify discharge learning needs (meds, wound care, etc )  - Refer to Case Management Department for coordinating discharge planning if the patient needs post-hospital services based on physician/advanced practitioner order or complex needs related to functional status, cognitive ability, or social support system   Outcome: Progressing     Problem: CARDIOVASCULAR - ADULT  Goal: Maintains optimal cardiac output and hemodynamic stability  Description  INTERVENTIONS:  - Monitor I/O, vital signs and rhythm  - Monitor for S/S and trends of decreased cardiac output  - Administer and titrate ordered vasoactive medications to optimize hemodynamic stability  - Assess quality of pulses, skin color and temperature  - Assess for signs of decreased coronary artery perfusion  - Instruct patient to report change in severity of symptoms  Outcome: Progressing  Goal: Absence of cardiac dysrhythmias or at baseline rhythm  Description  INTERVENTIONS:  - Continuous cardiac monitoring, vital signs, obtain 12 lead EKG if ordered  - Administer antiarrhythmic and heart rate control medications as ordered  - Monitor electrolytes and administer replacement therapy as ordered  Outcome: Progressing     Problem: RESPIRATORY - ADULT  Goal: Achieves optimal ventilation and oxygenation  Description  INTERVENTIONS:  - Assess for changes in respiratory status  - Assess for changes in mentation and behavior  - Position to facilitate oxygenation and minimize respiratory effort  - Oxygen administered by appropriate delivery if ordered  - Encourage broncho-pulmonary hygiene including cough, deep breathe, Incentive Spirometry  - Assess and instruct to report SOB or any respiratory difficulty  - Respiratory Therapy support as indicated   Outcome: Progressing

## 2019-10-07 NOTE — ASSESSMENT & PLAN NOTE
· Patient is current with pain management  · Received injection into left shoulder 1 hour prior to having onset of chest pain  · Patient states he had discontinued his Xanax, BuSpar for 48 hours secondary to cortisone injection  · Will order patient Flexeril and morphine for pain

## 2019-10-08 ENCOUNTER — APPOINTMENT (OUTPATIENT)
Dept: NON INVASIVE DIAGNOSTICS | Facility: HOSPITAL | Age: 59
End: 2019-10-08
Payer: COMMERCIAL

## 2019-10-08 ENCOUNTER — APPOINTMENT (OUTPATIENT)
Dept: MRI IMAGING | Facility: HOSPITAL | Age: 59
End: 2019-10-08
Payer: COMMERCIAL

## 2019-10-08 VITALS
OXYGEN SATURATION: 100 % | RESPIRATION RATE: 18 BRPM | BODY MASS INDEX: 47.74 KG/M2 | HEART RATE: 60 BPM | HEIGHT: 68 IN | DIASTOLIC BLOOD PRESSURE: 84 MMHG | SYSTOLIC BLOOD PRESSURE: 175 MMHG | TEMPERATURE: 98 F | WEIGHT: 315 LBS

## 2019-10-08 LAB
ALBUMIN SERPL BCP-MCNC: 3.8 G/DL (ref 3.5–5.7)
ALP SERPL-CCNC: 35 U/L (ref 40–150)
ALT SERPL W P-5'-P-CCNC: 15 U/L (ref 7–52)
ANION GAP SERPL CALCULATED.3IONS-SCNC: 5 MMOL/L (ref 4–13)
AST SERPL W P-5'-P-CCNC: 13 U/L (ref 13–39)
ATRIAL RATE: 55 BPM
ATRIAL RATE: 57 BPM
ATRIAL RATE: 67 BPM
BASOPHILS # BLD AUTO: 0.1 THOUSANDS/ΜL (ref 0–0.1)
BASOPHILS NFR BLD AUTO: 1 % (ref 0–2)
BILIRUB SERPL-MCNC: 0.5 MG/DL (ref 0.2–1)
BUN SERPL-MCNC: 20 MG/DL (ref 7–25)
CALCIUM SERPL-MCNC: 9.2 MG/DL (ref 8.6–10.5)
CHLORIDE SERPL-SCNC: 105 MMOL/L (ref 98–107)
CHOLEST SERPL-MCNC: 182 MG/DL (ref 0–200)
CO2 SERPL-SCNC: 29 MMOL/L (ref 21–31)
CREAT SERPL-MCNC: 0.78 MG/DL (ref 0.7–1.3)
EOSINOPHIL # BLD AUTO: 0.2 THOUSAND/ΜL (ref 0–0.61)
EOSINOPHIL NFR BLD AUTO: 4 % (ref 0–5)
ERYTHROCYTE [DISTWIDTH] IN BLOOD BY AUTOMATED COUNT: 15.1 % (ref 11.5–14.5)
GFR SERPL CREATININE-BSD FRML MDRD: 99 ML/MIN/1.73SQ M
GLUCOSE SERPL-MCNC: 105 MG/DL (ref 65–99)
HCT VFR BLD AUTO: 42.5 % (ref 42–47)
HDLC SERPL-MCNC: 26 MG/DL (ref 40–60)
HGB BLD-MCNC: 14.5 G/DL (ref 14–18)
LDLC SERPL CALC-MCNC: 141 MG/DL (ref 0–100)
LYMPHOCYTES # BLD AUTO: 1.3 THOUSANDS/ΜL (ref 0.6–4.47)
LYMPHOCYTES NFR BLD AUTO: 21 % (ref 21–51)
MAGNESIUM SERPL-MCNC: 1.9 MG/DL (ref 1.9–2.7)
MCH RBC QN AUTO: 29.2 PG (ref 26–34)
MCHC RBC AUTO-ENTMCNC: 34.1 G/DL (ref 31–37)
MCV RBC AUTO: 86 FL (ref 81–99)
MONOCYTES # BLD AUTO: 0.7 THOUSAND/ΜL (ref 0.17–1.22)
MONOCYTES NFR BLD AUTO: 11 % (ref 2–12)
NEUTROPHILS # BLD AUTO: 3.9 THOUSANDS/ΜL (ref 1.4–6.5)
NEUTS SEG NFR BLD AUTO: 64 % (ref 42–75)
P AXIS: 58 DEGREES
P AXIS: 59 DEGREES
P AXIS: 74 DEGREES
PLATELET # BLD AUTO: 161 THOUSANDS/UL (ref 149–390)
PMV BLD AUTO: 7.6 FL (ref 8.6–11.7)
POTASSIUM SERPL-SCNC: 4.1 MMOL/L (ref 3.5–5.5)
PR INTERVAL: 160 MS
PR INTERVAL: 160 MS
PR INTERVAL: 164 MS
PROT SERPL-MCNC: 6.6 G/DL (ref 6.4–8.9)
QRS AXIS: 40 DEGREES
QRS AXIS: 47 DEGREES
QRS AXIS: 56 DEGREES
QRSD INTERVAL: 82 MS
QRSD INTERVAL: 98 MS
QRSD INTERVAL: 98 MS
QT INTERVAL: 398 MS
QT INTERVAL: 454 MS
QT INTERVAL: 460 MS
QTC INTERVAL: 420 MS
QTC INTERVAL: 434 MS
QTC INTERVAL: 447 MS
RBC # BLD AUTO: 4.97 MILLION/UL (ref 4.3–5.9)
SODIUM SERPL-SCNC: 139 MMOL/L (ref 134–143)
T WAVE AXIS: 47 DEGREES
T WAVE AXIS: 51 DEGREES
T WAVE AXIS: 66 DEGREES
TRIGL SERPL-MCNC: 73 MG/DL (ref 44–166)
TROPONIN I SERPL-MCNC: <0.03 NG/ML
VENTRICULAR RATE: 55 BPM
VENTRICULAR RATE: 57 BPM
VENTRICULAR RATE: 67 BPM
WBC # BLD AUTO: 6.1 THOUSAND/UL (ref 4.8–10.8)

## 2019-10-08 PROCEDURE — 93306 TTE W/DOPPLER COMPLETE: CPT | Performed by: INTERNAL MEDICINE

## 2019-10-08 PROCEDURE — 85025 COMPLETE CBC W/AUTO DIFF WBC: CPT | Performed by: NURSE PRACTITIONER

## 2019-10-08 PROCEDURE — 72148 MRI LUMBAR SPINE W/O DYE: CPT

## 2019-10-08 PROCEDURE — 83735 ASSAY OF MAGNESIUM: CPT | Performed by: NURSE PRACTITIONER

## 2019-10-08 PROCEDURE — 93306 TTE W/DOPPLER COMPLETE: CPT

## 2019-10-08 PROCEDURE — 99217 PR OBSERVATION CARE DISCHARGE MANAGEMENT: CPT | Performed by: HOSPITALIST

## 2019-10-08 PROCEDURE — 93005 ELECTROCARDIOGRAM TRACING: CPT

## 2019-10-08 PROCEDURE — 99244 OFF/OP CNSLTJ NEW/EST MOD 40: CPT | Performed by: INTERNAL MEDICINE

## 2019-10-08 PROCEDURE — 93010 ELECTROCARDIOGRAM REPORT: CPT | Performed by: INTERNAL MEDICINE

## 2019-10-08 PROCEDURE — 80053 COMPREHEN METABOLIC PANEL: CPT | Performed by: NURSE PRACTITIONER

## 2019-10-08 PROCEDURE — 80061 LIPID PANEL: CPT | Performed by: NURSE PRACTITIONER

## 2019-10-08 PROCEDURE — 94660 CPAP INITIATION&MGMT: CPT

## 2019-10-08 RX ORDER — CYCLOBENZAPRINE HCL 10 MG
10 TABLET ORAL 3 TIMES DAILY PRN
Qty: 30 TABLET | Refills: 0 | Status: SHIPPED | OUTPATIENT
Start: 2019-10-08 | End: 2020-02-10

## 2019-10-08 RX ORDER — OXYCODONE HYDROCHLORIDE AND ACETAMINOPHEN 5; 325 MG/1; MG/1
1 TABLET ORAL EVERY 8 HOURS PRN
Qty: 30 TABLET | Refills: 0 | Status: SHIPPED | OUTPATIENT
Start: 2019-10-08 | End: 2019-10-18

## 2019-10-08 RX ORDER — OXYCODONE HYDROCHLORIDE AND ACETAMINOPHEN 5; 325 MG/1; MG/1
1 TABLET ORAL EVERY 4 HOURS PRN
Status: DISCONTINUED | OUTPATIENT
Start: 2019-10-08 | End: 2019-10-08 | Stop reason: HOSPADM

## 2019-10-08 RX ADMIN — MORPHINE SULFATE 2 MG: 2 INJECTION, SOLUTION INTRAMUSCULAR; INTRAVENOUS at 00:04

## 2019-10-08 RX ADMIN — MORPHINE SULFATE 2 MG: 2 INJECTION, SOLUTION INTRAMUSCULAR; INTRAVENOUS at 10:08

## 2019-10-08 RX ADMIN — FLUOXETINE 40 MG: 20 CAPSULE ORAL at 10:10

## 2019-10-08 RX ADMIN — MORPHINE SULFATE 2 MG: 2 INJECTION, SOLUTION INTRAMUSCULAR; INTRAVENOUS at 05:22

## 2019-10-08 RX ADMIN — METOPROLOL SUCCINATE 50 MG: 50 TABLET, EXTENDED RELEASE ORAL at 10:10

## 2019-10-08 RX ADMIN — MORPHINE SULFATE 2 MG: 2 INJECTION, SOLUTION INTRAMUSCULAR; INTRAVENOUS at 13:13

## 2019-10-08 RX ADMIN — HEPARIN SODIUM 5000 UNITS: 5000 INJECTION INTRAVENOUS; SUBCUTANEOUS at 05:22

## 2019-10-08 RX ADMIN — PERFLUTREN 0.6 ML/MIN: 6.52 INJECTION, SUSPENSION INTRAVENOUS at 11:54

## 2019-10-08 RX ADMIN — CYCLOBENZAPRINE HYDROCHLORIDE 10 MG: 10 TABLET, FILM COATED ORAL at 10:09

## 2019-10-08 RX ADMIN — BUSPIRONE HYDROCHLORIDE 10 MG: 5 TABLET ORAL at 10:09

## 2019-10-08 RX ADMIN — ASPIRIN 325 MG: 325 TABLET ORAL at 10:09

## 2019-10-08 NOTE — DISCHARGE SUMMARY
Discharge- Joseline Cool 1960, 61 y o  male MRN: [id number]    Unit/Bed#: -02 Encounter: [id number]    Primary Care Provider: Verona Espinoza DO   Date and time admitted to hospital: 10/7/2019 10:13 AM        * Chest pain  Assessment & Plan  · Currently resolved  · Most likely musculoskeletal in etiology - secondary to chronic left shoulder pain for which she received a cortisone injection recently  · Troponins x5 have been negative  · EKG is nondiagnostic, recent outpatient [facility] was within normal limits  · 2D echocardiogram-preliminary read grossly within normal limits  · Patient has been provided with a prescription for Percocet  · Okay for discharge home  · Needs to follow up outpatient with pain management    Chronic left shoulder pain  Assessment & Plan  · Prescriptions provided for Flexeril and Percocet  · Outpatient follow-up with PCP and pain management    Coronary arteriosclerosis in native artery  Assessment & Plan  · Stable   · DC home on pre-admit meds at pre-admit dosages  · Outpatient follow-up PCP and Cardiology  · Of note the patient's daughter was bedside at the time of my discharge evaluation works in the local cardiologist's office    She verbalized understanding of all discharge instructions    Atrial fibrillation ([address] )  Assessment & Plan  · Not on any chronic anticoagulation  · Continue Toprol XL    Essential (primary) hypertension  Assessment & Plan  · Continue Toprol XL    Sleep apnea  Assessment & Plan  · Patient uses CPAP - setting of 14  · Continue CPAP at home    Obesity  Assessment & Plan  · Supportive care  · BMI is 50 72  · Weight loss, dietary,  lifestyle and modification counseling provided          Discharging Physician / Practitioner: Linda Claros MD  PCP: Verona Espinoza DO  Admission Date:   Admission Orders (From admission, onward)     Ordered        10/07/19 1616  Place in Observation (expected length of stay for this patient is less than two midnights)  Once                   Discharge Date: 10/08/19    Resolved Problems  Date Reviewed: 10/7/2019    None          Consultations During Hospital Stay:  · Cardiology    Procedures Performed:   · None    Significant Findings / Test Results:   · CTA chest dissection protocol-1   Ascending aortic aneurysm measuring up to 4 7 cm at the level the right pulmonary artery   No evidence of aortic dissection  2   Aberrant takeoff of the right subclavian artery, a normal variant  3   4 mm right lower lobe pulmonary nodule unchanged from 2/21/2019  Based on current Fleischner Society 2017 Guidelines on incidental pulmonary nodule, no routine follow-up is needed if the patient is considered low risk for lung cancer   If the patient   is considered high risk for lung cancer, 12 month follow-up non-contrast chest CT is recommended  4   Hepatic steatosis  · X-ray chest-no acute cardiopulmonary disease    Incidental Findings:   · 1   Ascending aortic aneurysm measuring up to 4 7 cm at the level the right pulmonary artery   No evidence of aortic dissection  2   Aberrant takeoff of the right subclavian artery, a normal variant  3   4 mm right lower lobe pulmonary nodule unchanged from 2/21/2019  Based on current Fleischner Society 2017 Guidelines on incidental pulmonary nodule, no routine follow-up is needed if the patient is considered low risk for lung cancer   If the patient   is considered high risk for lung cancer, 12 month follow-up non-contrast chest CT is recommended  Test Results Pending at Discharge (will require follow up):   · MRI L-spine that will be followed up by pain management     Outpatient Tests Requested:  · None    Complications:     None    Reason for Admission:  Chest pain rule out ACS    Hospital Course:     Linda Stone is a 61 y o  male patient who originally presented to the hospital on 10/7/2019 due to chest pain    Please refer to the initial history and physical examination completed by [facility] for the initial presenting features and complaints  In brief the patient is a 51-year-old male who was admitted to the [facility] observation telemetry unit after he came in complaining of chest pain  He was monitored overnight  He ruled out for the possibility of acute coronary event would 5 sets of troponins that were normal   EKGs were nondiagnostic  Echocardiogram was grossly within normal limits  Of note prior to arrival approximately 2 weeks ago the patient also had a Lexiscan which was normal   It was further deemed and at that his pain was more located in his left shoulder  He recently had some sort of injection to his left shoulder with pain management as an outpatient  He was ultimately deemed stable for discharge on the afternoon of 10/08/2019 after being seen by Cardiology  He was given a prescription for Flexeril and Percocet  He was advised to stop his Robaxin and nonsteroidal anti inflammatory medications  He was otherwise discharged on all of his other pre-admission medications at the preadmission dosages  He will follow up in the outpatient setting with his PCP, Cardiology and Pain Management  Of note his daughter works in the local cardiologist's office  He had a CT of the chest at time of arrival which yielded a 4 mm right lower lobe pulmonary nodule which was unchanged from 02/21/2019  He was additionally found to have an ascending aortic aneurysm measuring 4 7 cm  Information was sent to his primary care physician for continued surveillance and monitoring for both conditions  Please see above list of diagnoses and related plan for additional information  Condition at Discharge: good     Discharge Day Visit / Exam:     Subjective:  Patient seen and examined  Continues to have intermittent left shoulder pain which is reproducible    Vitals: Blood Pressure: (!) 175/84 (10/08/19 1135)  Pulse: 60 (10/08/19 1135)  Temperature: 98 °F (36 7 °C) (10/08/19 1135)  Temp Source: Tympanic (10/08/19 1135)  Respirations: 18 (10/08/19 1135)  Height: 5' 8" (172 7 cm) (10/07/19 1657)  Weight - Scale: (!) 151 kg (333 lb 8 9 oz) (10/07/19 1657)  SpO2: 100 % (10/08/19 1135)  Exam:   Physical Exam   Constitutional: He is oriented to person, place, and time  He appears well-developed and well-nourished  HENT:   Head: Normocephalic and atraumatic  Nose: Nose normal    Mouth/Throat: Oropharynx is clear and moist    Eyes: Pupils are equal, round, and reactive to light  Conjunctivae and EOM are normal    Neck: Normal range of motion  Neck supple  No JVD present  No thyromegaly present  Cardiovascular: Normal rate, regular rhythm and intact distal pulses  Exam reveals no gallop and no friction rub  No murmur heard  Pulmonary/Chest: Effort normal and breath sounds normal  No respiratory distress  Abdominal: Soft  Bowel sounds are normal  He exhibits no distension and no mass  There is no tenderness  There is no guarding  Musculoskeletal: Normal range of motion  He exhibits no edema  Lymphadenopathy:     He has no cervical adenopathy  Neurological: He is alert and oriented to person, place, and time  No cranial nerve deficit  Skin: Skin is warm  No rash noted  No erythema  Psychiatric: He has a normal mood and affect  His behavior is normal    Vitals reviewed  Discussion with Family:  Patient's daughter was bedside at the time of my discharge evaluation, all questions answered to her satisfaction    Discharge instructions/Information to patient and family:   See after visit summary for information provided to patient and family  Provisions for Follow-Up Care:  See after visit summary for information related to follow-up care and any pertinent home health orders        Disposition:     Home    For Discharges to [facility] SNF:   · Not Applicable to this Patient - Not Applicable to this Patient    Planned Readmission:    None     Discharge Statement:  I spent 40 minutes discharging the patient  This time was spent on the day of discharge  I had direct contact with the patient on the day of discharge  Greater than 50% of the total time was spent examining patient, answering all patient questions, arranging and discussing plan of care with patient as well as directly providing post-discharge instructions  Additional time then spent on discharge activities  Discharge Medications:  See after visit summary for reconciled discharge medications provided to patient and family        ** Please Note: This note has been constructed using a voice recognition system **

## 2019-10-08 NOTE — SOCIAL WORK
Evaluated the pt at the bedside  Pt was admitted to the hospital with chest pain  Explained the role of CM and the options of discharge planning with the pt  Pt indicated he lives in a 2 story home with his wife  Pt has 5 JAXON in the front and a ramp at the side  Pt has 12 steps inside the home to the second floor  Pt states the only DME he has at home is a CPAP  Pt reports he is independent with ADL's and IADL's  Pt works full time for [employer]  Pt gets his medications from [facility]  Pt states his wife will transport home upon discharge  Pt PCP is Dr Lauren Alcantar  Pt is awaiting the echo to be read by the cardiologist   Discussed the Observation level of care with the pt and provided booklet of same  Pt verbalized understading of same  Patient/caregiver received discharge checklist   Content reviewed  Patient/caregiver encouraged to participate in discharge plan of care prior to discharge home  CM reviewed d/c planning process including the following: identifying help at home, patient preference for d/c planning needs, availability of treatment team to discuss questions or concerns patient and/or family may have regarding understanding medications and recognizing signs and symptoms once discharged  CM also encouraged patient to follow up with all recommended appointments after discharge  Patient advised of importance for patient and family to participate in managing patients medical well being

## 2019-10-08 NOTE — ASSESSMENT & PLAN NOTE
· Stable   · DC home on pre-admit meds at pre-admit dosages  · Outpatient follow-up PCP and Cardiology  · Of note the patient's daughter was bedside at the time of my discharge evaluation works in the local cardiologist's office    She verbalized understanding of all discharge instructions

## 2019-10-08 NOTE — INCIDENTAL FINDINGS
The following findings require follow up:  Radiographic finding   Finding: l-1   Ascending aortic aneurysm measuring up to 4 7 cm at the level the right pulmonary artery   No evidence of aortic dissection  2   Aberrant takeoff of the right subclavian artery, a normal variant  3   4 mm right lower lobe pulmonary nodule unchanged from 2/21/2019  Based on current Fleischner Society 2017 Guidelines on incidental pulmonary nodule, no routine follow-up is needed if the patient is considered low risk for lung cancer   If the patient   is considered high risk for lung cancer, 12 month follow-up non-contrast chest CT is recommended     Follow up required:  Repeat CT chest 6-12 months   Follow up should be done within 6-12 months  Please notify the following clinician to assist with the follow up:   Dr Keanu Courtney, [facility]

## 2019-10-08 NOTE — ASSESSMENT & PLAN NOTE
· Prescriptions provided for Flexeril and Percocet  · Outpatient follow-up with PCP and pain management

## 2019-10-08 NOTE — ASSESSMENT & PLAN NOTE
· Supportive care  · BMI is 50 72  · Weight loss, dietary,  lifestyle and modification counseling provided

## 2019-10-08 NOTE — RESPIRATORY THERAPY NOTE
Pt  Madhuri Cage on "our" CPAP unit(VPAP auto 25)  Setting of [id number] as ordered  Head gear adjusted for pt  Comfort  Pt  States he is comfortable

## 2019-10-08 NOTE — NURSING NOTE
Patient discharged home  IV removed  Verbalizes understanding of discharge instructions  Patient has all belongings    Escorted out by [place]

## 2019-10-08 NOTE — UTILIZATION REVIEW
Initial Clinical Review    Admission: Date/Time/Statement: 10/7 @ 1616 OBSERVATION    Orders Placed This Encounter   Procedures    Place in Observation (expected length of stay for this patient is less than two midnights)     Standing Status:   Standing     Number of Occurrences:   1     Order Specific Question:   Admitting Physician     Answer:   Avinash Ervin [[id number]]     Order Specific Question:   Level of Care     Answer:   Med Surg [[id number]]     ED Arrival Information     Expected Arrival Acuity Means of Arrival Escorted By Service Admission Type    - 10/7/2019 10:11 Emergent Walk-In Self General Medicine Emergency    Arrival Complaint    chest pain        Chief Complaint   Patient presents with    Chest Pain     substernal, 1 hour ago post cortisone shot, 8/10 burning, non-reproducible     Assessment/Plan: 62 y/o male presents to ED from home with substernal chest pain after receiving cortisone shot in L shoulder  Has been experiencing intermittent chest pain over past 2 weeks with 2 ED visits and negative workups  Admitted as observation due to chest pain, chronic L shoulder pain  Trend troponins  Consult cardiology  Echo   Tele  Pain control      ED Triage Vitals   Temperature Pulse Respirations Blood Pressure SpO2   10/07/19 1031 10/07/19 1031 10/07/19 1031 10/07/19 1031 10/07/19 1031   98 5 °F (36 9 °C) 65 22 164/90 97 %      Temp Source Heart Rate Source Patient Position - Orthostatic VS BP Location FiO2 (%)   10/07/19 1657 10/07/19 1031 10/07/19 1031 10/07/19 1031 --   Temporal Monitor Lying Right arm       Pain Score       10/07/19 1031       8        Wt Readings from Last 1 Encounters:   10/07/19 (!) 151 kg (333 lb 8 9 oz)     Additional Vital Signs:   10/08/19 0942  97 4 °F (36 3 °C)Abnormal   64  18  152/80  99 %  None (Room air)   10/08/19 0324  96 7 °F (35 9 °C)Abnormal   60  18  115/56  96 %  --    10/07/19 2356  96 7 °F (35 9 °C)Abnormal   76  16  126/81  96 %  --    10/07/19 2004  97 4 °F (36 3 °C)Abnormal   75  22  129/69  94 %  None (Room air)   10/07/19 1657  97 9 °F (36 6 °C)  59  22  162/98  96 %  None (Room air)       Pertinent Labs/Diagnostic Test Results:   Results from last 7 days   Lab Units 10/08/19  0514 10/07/19  1025   WBC Thousand/uL 6 10 5 70   HEMOGLOBIN g/dL 14 5 16 4   HEMATOCRIT % 42 5 48 1*   PLATELETS Thousands/uL 161 166   NEUTROS ABS Thousands/µL 3 90 3 80         Results from last 7 days   Lab Units 10/08/19  0514 10/07/19  1025   SODIUM mmol/L 139 137   POTASSIUM mmol/L 4 1 4 2   CHLORIDE mmol/L 105 102   CO2 mmol/L 29 28   ANION GAP mmol/L 5 7   BUN mg/dL 20 18   CREATININE mg/dL 0 78 0 78   EGFR ml/min/1 73sq m 99 99   CALCIUM mg/dL 9 2 10 0   MAGNESIUM mg/dL 1 9  --      Results from last 7 days   Lab Units 10/08/19  0514 10/07/19  1025   AST U/L 13 17   ALT U/L 15 19   ALK PHOS U/L 35* 39*   TOTAL PROTEIN g/dL 6 6 7 8   ALBUMIN g/dL 3 8 4 5   TOTAL BILIRUBIN mg/dL 0 50 0 60         Results from last 7 days   Lab Units 10/08/19  0514 10/07/19  1025   GLUCOSE RANDOM mg/dL 105* 99     Results from last 7 days   Lab Units 10/07/19  2325 10/07/19  2014 10/07/19  1735 10/07/19  1344 10/07/19  1025   TROPONIN I ng/mL <0 03 <0 03 <0 03 <0 03 0 03             Results from last 7 days   Lab Units 10/07/19  1735   TSH 3RD GENERATON uIU/mL 1 440     10/7 EKG:  Normal sinus rhythm    10/7 CXR:  No acute cardiopulmonary disease  10/7 CTA chest dissection protocol: 1   Ascending aortic aneurysm measuring up to 4 7 cm at the level the right pulmonary artery   No evidence of aortic dissection  2   Aberrant takeoff of the right subclavian artery, a normal variant  3   4 mm right lower lobe pulmonary nodule unchanged from 2/21/2019   Based on current Fleischner Society 2017 Guidelines on incidental pulmonary nodule, no routine follow-up is needed if the patient is considered low risk for lung cancer   If the patient   is considered high risk for lung cancer, 12 month follow-up non-contrast chest CT is recommended  4   Hepatic steatosis  MRI L spine pending    ED Treatment:   Medication Administration from 10/07/2019 1011 to 10/07/2019 1652       Date/Time Order Dose Route Action Action by Comments     10/07/2019 1047 aspirin chewable tablet 324 mg 324 mg Oral Given Neo Calloway RN      10/07/2019 1256 busPIRone (BUSPAR) tablet 10 mg 10 mg Oral Given Neo Calloway RN      10/07/2019 1358 iohexol (OMNIPAQUE) 350 MG/ML injection (MULTI-DOSE) 100 mL 100 mL Intravenous Given Santos Mckeon      10/07/2019 1459 morphine (PF) 4 mg/mL injection 4 mg 4 mg Intravenous Given Neo Calloway RN      10/07/2019 1539 diazepam (VALIUM) injection 2 5 mg 2 5 mg Intravenous Given Neo Calloway RN         Past Medical History:   Diagnosis Date    Anxiety     Arthritis     Athscl heart disease of native coronary artery w/o ang pctrs     Atrial fibrillation ([facility] [address] ) 2012 and 2016    CPAP (continuous positive airway pressure) dependence     Diverticulitis     Edema     Legs and hands    Essential tremor     Hard of hearing     Hx of cardiovascular stress test 04/28/2016    EF0 50 (50%) normal LVSF  No evidence for prior ischemia or MI    Hx of echocardiogram 05/18/2016    EF0 55 (55%) severely technically limited suboptimal study  No significant valvular insufficiency or stenosis   / EF0 60 (60%) Trace MR and TR 6/22/17  / EF0 65 (65%) technically difficult study  no hemodynamically significant valve disease identified   11/30/17      Hypercholesteremia     Hypertension     Mixed hyperlipidemia     Morbid obesity (HCC)     Obesity     Panic attacks     Rash     On extremeties and chest  Chronic, recurrent    Sleep apnea     Varicose veins of left lower extremity     Bilateral     Present on Admission:   Chest pain   Coronary arteriosclerosis in native artery   Sleep apnea   Obesity   Atrial fibrillation ([address] )   Essential (primary) hypertension   Chronic left shoulder pain      Admitting Diagnosis: Chest pain [R07 9]  Stable angina pectoris (HCC) [I20 8]  Age/Sex: 61 y o  male  Admission Orders:    Current Facility-Administered Medications:  acetaminophen 650 mg Oral Q6H PRN   albuterol 2 puff Inhalation Q6H PRN   ALPRAZolam 1 mg Oral HS   aspirin 325 mg Oral Daily   busPIRone 10 mg Oral Daily   busPIRone 5 mg Oral QPM   cyclobenzaprine 10 mg Oral TID PRN x1   FLUoxetine 40 mg Oral Daily   heparin (porcine) 5,000 Units Subcutaneous Q8H [address]   metoprolol succinate 50 mg Oral Daily   morphine injection 2 mg Intravenous Q4H PRN x4   nitroglycerin 0 4 mg Sublingual Q5 Min PRN   ondansetron 4 mg Intravenous Q6H PRN   sodium chloride (PF) 3 mL Intravenous PRN   zolpidem 5 mg Oral HS PRN       IP CONSULT TO CARDIOLOGY  IP CONSULT TO CASE MANAGEMENT   Tele  VS  Continuous ST monitoring  EKG with chest pain or ST change  SCDs  MRI L spine  CPAP  Echo    Network Utilization Review Department  Phone: [phone]; Fax [phone]  [email or web address]  ATTENTION: Please call with any questions or concerns to [phone]  and carefully listen to the prompts so that you are directed to the right person  Send all requests for admission clinical reviews, approved or denied determinations and any other requests to fax [phone]   All voicemails are confidential

## 2019-10-08 NOTE — CONSULTS
[facility] [address]  [place], [address]  [phone]     Tax ID: [id number]      NPI: [id number]                                                Cardiology Consult  Malaika Gastelum 61 y o  male   YOB: 1960 MRN: [id number]  Unit/Bed#: -02 Encounter: [id number]      Physician Requesting Consult: Emily Coyne MD  Reason for Consult / Principal Problem: Chest pain    Assessment and Plan  1  Chest pain  · Troponin negative x3  · ECG - sinus bradycardia, otherwise normal  · Recent normal nuclear stress test = Nuclear Rx stress test - 9/20/19 - LVEF 45% (visually 55%), no perfusion defects  · 2 distinct chest pains --> left upper pain is musculoskeletal pain, central sternal pain (presenting) is possibly acid reflux/gastritis related to frequent NSAID used  · Echo pending  · Counseled regarding cutting down on NSAID use, and using Tylenol or topical BenGay to help with his pain  Trial of omeprazole or pantoprazole while he is using these NSAIDs  · As long as echo is without any significant abnormalities, he is okay to be discharged to home with outpatient follow-up with Dr Kym Garcia  2  Hyperlipidemia  · Lipids - 10/8/19 - , TG 73, HDL 26,   · ASCVD 10-yr risk 13 3%  · Recommend starting on low-dose statin - atorvastatin 10  3  Paroxysmal Atrial Fibrillation   · Currently in NSR  · Not on anticoagulation, only full dose aspirin  · On metoprolol XL 50  · continue  4  LUNA on CPAP  5  Anxiety  6  Hypertension  7  Left shoulder pain  8  Morbid Obesity, Body mass index is 50 72 kg/m²  · Counseled regarding need for weight loss and increasing exercise    Outpatient cardiologist - Dr Maldonado      History of Present Illness   HPI: Malaika Gastelum is a 61y o  year old male who presents with chest pain      51-year-old gentleman has been having complains of chest pain intermittently over the last couple of weeks came into the hospital yesterday morning with complaints of chest pain  He described his presenting pain as central, substernal, and a severe discomfort  There was no associated nausea, vomiting or diaphoresis  He [place] at that time (around 10 AM), when he felt his initial symptoms  He says that this pain is different from the chest pain he has been having over the last couple of weeks  He additionally has a separate distinct pain over the left upper chest wall as well as left shoulder and extending into the left arm  He recently got her left shoulder steroid injection for the same, and initially felt better, but has more spasms over his left upper chest wall today  He has been using a lot of NSAIDs over the last 2 weeks  He was initially prescribed naproxen, but then ran out of it and has been using Advil 600 milligrams twice a day for the past week along with some additional Tylenol  Past Medical History:   Diagnosis Date    Anxiety     Arthritis     Athscl heart disease of native coronary artery w/o ang pctrs     Atrial fibrillation ([address] ) 2012 and 2016    CPAP (continuous positive airway pressure) dependence     Diverticulitis     Edema     Legs and hands    Essential tremor     Hard of hearing     Hx of cardiovascular stress test 04/28/2016    EF0 50 (50%) normal LVSF  No evidence for prior ischemia or MI    Hx of echocardiogram 05/18/2016    EF0 55 (55%) severely technically limited suboptimal study  No significant valvular insufficiency or stenosis   / EF0 60 (60%) Trace MR and TR 6/22/17  / EF0 65 (65%) technically difficult study  no hemodynamically significant valve disease identified   11/30/17      Hypercholesteremia     Hypertension     Mixed hyperlipidemia     Morbid obesity (HCC)     Obesity     Panic attacks     Rash     On extremeties and chest  Chronic, recurrent    Sleep apnea     Varicose veins of left lower extremity     Bilateral     Past Surgical History: Procedure Laterality Date    ABLATION RADIO FREQUENCY ATRIAL (MAZE/CRYOABLATION)  02/08/2018    Cryoablation lesion summary, 10 applications were delivered      CARDIAC CATHETERIZATION  11/2012    No sig CAD  6/26/17    COLONOSCOPY      JOINT REPLACEMENT Bilateral     knees    [place] TOTAL KNEE ARTHROPLASTY Bilateral 6/1/2016    Procedure: ARTHROPLASTY KNEE TOTAL BILATERAL;  Surgeon: Devika Marie MD;  Location: [facility];  Service: Orthopedics     Family History   Problem Relation Age of Onset    Coronary artery disease Family         less than 61 yrs of age   [facility] Heart attack Mother         MI    Heart attack Sister         MI    Heart attack Brother         MI     Meds/Allergies   all current active meds have been reviewed and current meds:   Current Facility-Administered Medications   Medication Dose Route Frequency    acetaminophen (TYLENOL) tablet 650 mg  650 mg Oral Q6H PRN    albuterol (PROVENTIL HFA,VENTOLIN HFA) inhaler 2 puff  2 puff Inhalation Q6H PRN    ALPRAZolam (XANAX) tablet 1 mg  1 mg Oral HS    aspirin tablet 325 mg  325 mg Oral Daily    busPIRone (BUSPAR) tablet 10 mg  10 mg Oral Daily    busPIRone (BUSPAR) tablet 5 mg  5 mg Oral QPM    cyclobenzaprine (FLEXERIL) tablet 10 mg  10 mg Oral TID PRN    FLUoxetine (PROzac) capsule 40 mg  40 mg Oral Daily    heparin (porcine) subcutaneous injection 5,000 Units  5,000 Units Subcutaneous Q8H [address]    metoprolol succinate (TOPROL-XL) 24 hr tablet 50 mg  50 mg Oral Daily    morphine injection 2 mg  2 mg Intravenous Q4H PRN    nitroglycerin (NITROSTAT) SL tablet 0 4 mg  0 4 mg Sublingual Q5 Min PRN    ondansetron (ZOFRAN) injection 4 mg  4 mg Intravenous Q6H PRN    sodium chloride (PF) 0 9 % injection 3 mL  3 mL Intravenous PRN    zolpidem (AMBIEN) tablet 5 mg  5 mg Oral HS PRN     Medications Prior to Admission   Medication    ALPRAZolam (XANAX) 1 mg tablet    aspirin 325 mg tablet    busPIRone (BUSPAR) 5 mg tablet    FLUoxetine (PROzac) 20 mg capsule    ibuprofen (MOTRIN) 600 mg tablet    methocarbamol (ROBAXIN) 750 mg tablet    metoprolol succinate (TOPROL-XL) 50 mg 24 hr tablet    zolpidem (AMBIEN) 5 mg tablet    albuterol (VENTOLIN HFA) 90 mcg/act inhaler    aspirin (ECOTRIN) 325 mg EC tablet    busPIRone (BUSPAR) 5 mg tablet    digoxin (LANOXIN) 0 25 mg tablet    naproxen (NAPROSYN) 500 mg tablet    senna-docusate sodium (SENOKOT S) 8 6-50 mg per tablet     Allergies   Allergen Reactions    Benadryl [Diphenhydramine] Other (See Comments)     Tachycardia     Social History     Socioeconomic History    Marital status: /Civil Union     Spouse name: None    Number of children: None    Years of education: None    Highest education level: None   Occupational History    None   Social Needs    Financial resource strain: None    Food insecurity:     Worry: None     Inability: None    Transportation needs:     Medical: None     Non-medical: None   Tobacco Use    Smoking status: Former Smoker     Packs/day: 1 00     Years: 18 00     Pack years: 18 00     Types: Cigarettes    Smokeless tobacco: Never Used    Tobacco comment: Quit 21 years ago   Substance and Sexual Activity    Alcohol use: Yes     Comment: socially    Drug use: No    Sexual activity: None   Lifestyle    Physical activity:     Days per week: None     Minutes per session: None    Stress: None   Relationships    Social connections:     Talks on phone: None     Gets together: None     Attends [employer] service: None     Active member of club or organization: None     Attends meetings of clubs or organizations: None     Relationship status: None    Intimate partner violence:     Fear of current or ex partner: None     Emotionally abused: None     Physically abused: None     Forced sexual activity: None   Other Topics Concern    None   Social History Narrative    None         Review of Systems  c/o chest pain, No c/o palpitations, No c/o shortness of breath, No c/o dizziness or light-headedness, No c/o abdominal pain, no c/o nausea/vomitting  All other systems negative    Vitals:    10/07/19 2027 10/07/19 2333 10/07/19 2356 10/08/19 0324   BP:   126/81 115/56   BP Location:   Left arm Left arm   Pulse:   76 60   Resp:   16 18   Temp:   (!) 96 7 °F (35 9 °C) (!) 96 7 °F (35 9 °C)   TempSrc:   Tympanic Tympanic   SpO2: 94% 94% 96% 96%   Weight:       Height:         Orthostatic Blood Pressures      Most Recent Value   Blood Pressure  115/56 filed at 10/08/2019 0324   Patient Position - Orthostatic VS  Lying filed at 10/08/2019 0324        Body mass index is 50 72 kg/m²  Wt Readings from Last 5 Encounters:   10/07/19 (!) 151 kg (333 lb 8 9 oz)   09/20/19 (!) 150 kg (330 lb)   08/29/19 (!) 150 kg (330 lb)   06/28/19 (!) 150 kg (330 lb)   02/26/19 (!) 148 kg (326 lb)     No intake/output data recorded  Physical Exam    Constitutional:  Sharan Paulino appears well, alert and oriented x 3, pleasant and cooperative  No acute distress  obese   HEENT:    Normocephalic, atraumatic   Neck:  Supple, No JVD   Lungs:  Clear to auscultation bilaterally, respirations unlabored    Chest Wall:  No tenderness or deformity    Heart[de-identified]  Regular rate, Regular rhythm, S1 and S2 normal, no murmur, rub or gallop    Abdomen:  Soft, non-tender, non-distended   Neurological:  Awake, alert, oriented x3   Non-focal exam    Extremities:  No pedal edema, No calf tenderness         Labs:  Results from last 7 days   Lab Units 10/08/19  0514 10/07/19  1025   WBC Thousand/uL 6 10 5 70   HEMOGLOBIN g/dL 14 5 16 4   HEMATOCRIT % 42 5 48 1*   RDW % 15 1* 15 1*   PLATELETS Thousands/uL 161 166     Results from last 7 days   Lab Units 10/08/19  0514 10/07/19  1025   POTASSIUM mmol/L 4 1 4 2   CHLORIDE mmol/L 105 102   CO2 mmol/L 29 28   MAGNESIUM mg/dL 1 9  --    BUN mg/dL 20 18   CREATININE mg/dL 0 78 0 78   CALCIUM mg/dL 9 2 10 0   AST U/L 13 17   ALT U/L 15 19   ALK PHOS U/L 35* 39*     Results from last 7 days   Lab Units 10/07/19  2325 10/07/19  2014 10/07/19  1735 10/07/19  1344 10/07/19  1025   TROPONIN I ng/mL <0 03 <0 03 <0 03 <0 03 0 03     Results from last 7 days   Lab Units 10/08/19  0514   HDL mg/dL 26*                 Telemetry: NSR  Imaging: X-ray Chest 2 Views    Result Date: 10/7/2019  Narrative: CHEST INDICATION:   chest pain  COMPARISON:  8/29/2019  EXAM PERFORMED/VIEWS:  XR CHEST PA & LATERAL FINDINGS: Cardiomediastinal silhouette appears unremarkable  The lungs are clear  No pneumothorax or pleural effusion  Osseous structures appear within normal limits for patient age  Impression: No acute cardiopulmonary disease  Workstation performed: [id number]     Xr Spine Cervical Complete 4 Or 5 Vw Non Injury    Result Date: 9/12/2019  Narrative: CERVICAL SPINE INDICATION: M54 2: Cervicalgia COMPARISON:  None EXAM PERFORMED/VIEWS:  XR SPINE CERVICAL COMPLETE 4 OR 5 VW NON INJURY IMAGES:  6 FINDINGS: Alignment is unremarkable  Mild disc degenerative narrowing at C5-6 and C6-7  There is no radiographic evidence of acute fracture or destructive osseous lesion  Prevertebral soft tissues appear unremarkable  No significant abnormality identified in the visualized lung apices  Impression: No acute osseous abnormality  Degenerative changes as described  Workstation performed: [id number]     Cta Dissection Protocol Chest And Abdomen    Result Date: 10/7/2019  Narrative: CTA - CHEST AND ABDOMEN  - WITHOUT AND WITH IV CONTRAST INDICATION:   chest pain radiating to back  COMPARISON:  Chest radiograph 10/7/2019, CT abdomen pelvis 2/21/2019 TECHNIQUE: CT examination of the chest and abdomen was performed both prior to and after the administration of intravenous contrast   Thin section angiographic arterial phase post contrast technique was used in order to evaluate for aortic dissection  3D reformatted images and volume rendering were performed on an independent workstation    Additionally, axial, sagittal, and coronal 2D reformatted images were created from the source data and submitted for interpretation  Radiation dose length product (DLP) for this visit:  2513 5 mGy-cm   This examination, like all CT scans performed in the [facility], was performed utilizing techniques to minimize radiation dose exposure, including the use of iterative  reconstruction and automated exposure control  IV Contrast:  100 mL of iohexol (OMNIPAQUE) Enteric Contrast: Enteric contrast was not administered  FINDINGS: AORTA: There is no aortic dissection or intramural hematoma  The ascending aorta is enlarged measuring 4 7 cm above the level the right pulmonary artery  There is an aberrant takeoff of the right subclavian artery  There are atherosclerotic changes of  aorta and its major branches  CHEST LUNGS: Stable 4 mm right lower lobe nodule (series 3 image 75)  Lungs are otherwise clear  There is no tracheal or endobronchial lesion  PLEURA:  Unremarkable  HEART/GREAT VESSELS:  Unremarkable for patient's age  MEDIASTINUM AND PING:  Unremarkable  CHEST WALL AND LOWER NECK:   Unremarkable  ABDOMEN LIVER/BILIARY TREE:  Liver is diffusely decreased in density consistent with fatty change  No CT evidence of suspicious hepatic mass  Normal hepatic contours  No biliary dilatation  GALLBLADDER:  No calcified gallstones  No pericholecystic inflammatory change  SPLEEN:  Unremarkable  PANCREAS:  Unremarkable  ADRENAL GLANDS:  Unremarkable  KIDNEYS/URETERS:  Unremarkable  No hydronephrosis  VISUALIZED STOMACH, BOWEL AND APPENDIX:  Unremarkable  VISUALIZED ABDOMINOPELVIC CAVITY:  No ascites or free intraperitoneal air  No lymphadenopathy  ABDOMINAL WALL:  Unremarkable  OSSEOUS STRUCTURES:  No acute fracture or destructive osseous lesion  Impression: 1  Ascending aortic aneurysm measuring up to 4 7 cm at the level the right pulmonary artery  No evidence of aortic dissection   2   Aberrant takeoff of the right subclavian artery, a normal variant  3   4 mm right lower lobe pulmonary nodule unchanged from 2/21/2019  Based on current Fleischner Society 2017 Guidelines on incidental pulmonary nodule, no routine follow-up is needed if the patient is considered low risk for lung cancer  If the patient  is considered high risk for lung cancer, 12 month follow-up non-contrast chest CT is recommended  4   Hepatic steatosis  The study was marked in EPIC for significant notification  Workstation performed: [id number]       Cardiac testing:   No results found for this or any previous visit  No results found for this or any previous visit  No results found for this or any previous visit  No results found for this or any previous visit  Counseling / Coordination of Care  Total floor / unit time spent today 35 minutes

## 2019-10-08 NOTE — DISCHARGE INSTR - AVS FIRST PAGE
Patient was hospitalized 10/07/2019 through 10/08/2019    Okay to return to work on Monday 10/14/2019

## 2019-10-08 NOTE — ASSESSMENT & PLAN NOTE
· Currently resolved  · Most likely musculoskeletal in etiology - secondary to chronic left shoulder pain for which she received a cortisone injection recently  · Troponins x5 have been negative  · EKG is nondiagnostic, recent outpatient [facility] was within normal limits  · 2D echocardiogram-preliminary read grossly within normal limits  · Patient has been provided with a prescription for Percocet  · Okay for discharge home  · Needs to follow up outpatient with pain management

## 2019-10-08 NOTE — PLAN OF CARE
Problem: DISCHARGE PLANNING - CARE MANAGEMENT  Goal: Discharge to post-acute care or home with appropriate resources  Description  INTERVENTIONS:  - Conduct assessment to determine patient/family and health care team treatment goals, and need for post-acute services based on payer coverage, community resources, and patient preferences, and barriers to discharge  - Address psychosocial, clinical, and financial barriers to discharge as identified in assessment in conjunction with the patient/family and health care team  - Arrange appropriate level of post-acute services according to patient's   needs and preference and payer coverage in collaboration with the physician and health care team  - Communicate with and update the patient/family, physician, and health care team regarding progress on the discharge plan  - Arrange appropriate transportation to post-acute venues  Discharge home with wife who will transport     Outcome: Progressing

## 2019-12-10 ENCOUNTER — EVALUATION (OUTPATIENT)
Dept: PHYSICAL THERAPY | Facility: CLINIC | Age: 59
End: 2019-12-10
Payer: COMMERCIAL

## 2019-12-10 DIAGNOSIS — Z98.1 S/P CERVICAL SPINAL FUSION: Primary | ICD-10-CM

## 2019-12-10 PROCEDURE — 97535 SELF CARE MNGMENT TRAINING: CPT | Performed by: PHYSICAL THERAPIST

## 2019-12-10 PROCEDURE — 97162 PT EVAL MOD COMPLEX 30 MIN: CPT | Performed by: PHYSICAL THERAPIST

## 2019-12-10 NOTE — LETTER
2019    Soraya Ugarte MD  [address]  [place] [id number]    Patient: Melyssa Coyne   YOB: 1960   Date of Visit: 12/10/2019     Encounter Diagnosis     ICD-10-CM    1  S/P cervical spinal fusion Z98 1        Dear Dr National Cirilo Childers:    Thank you for your recent referral of Melyssa Coyne  Please review the attached evaluation summary from Rufus's recent visit  Please verify that you agree with the plan of care by signing the attached order  If you have any questions or concerns, please do not hesitate to call  I sincerely appreciate the opportunity to share in the care of one of your patients and hope to have another opportunity to work with you in the near future  Sincerely,    Viktoria Schumacher, PT      Referring Provider:      I certify that I have read the below Plan of Care and certify the need for these services furnished under this plan of treatment while under my care  Soraya Ugarte MD  [address]  [place] [address]: [phone]          PT Evaluation     Today's date: 12/10/2019  Patient name: Melyssa Coyne  : 1960  MRN: [id number]  Referring provider: Eduardo Saldivar MD  Dx:   Encounter Diagnosis     ICD-10-CM    1  S/P cervical spinal fusion Z98 1                   Assessment  Assessment details: Melyssa Coyne is a 61 y o  male who presents to PT  S/P cervical spinal fusion (ACDF C6-7)  Patient demonstrates gross weakness of L UE  Chest pain was able to be reproduced with palpation to posterior ribs, indicating that source of pain could be originating from costovertebral and costochondral joints  Patient was noted to have limited mobility throughout thoracic spine  Patient would benefit from skilled PT services to address impairments and improve function   Patient may benefit in future from referral and consult with speech-language pathologist to address c/o difficulty swallowing and hoarseness since surgery  Prognosis is good given HEP compliance and PT 3x/wk  Please contact me if you have any questions or recommendations  Thank you for the opportunity to share in  [facility]  Impairments: abnormal or restricted ROM, activity intolerance, impaired physical strength, lacks appropriate home exercise program and pain with function  Understanding of Dx/Px/POC: good   Prognosis: good    Goals  STGs  1) In 4 weeks, patient will report 50% reduction in pain  2) In 4 weeks, patient will demonstrate 1/2 grade or higher increase in L UE stretch during MMT  3) In 4 weeks, patient will demonstrate improved L  strength to reach 50% of R UE  strength    LTGs  1) In 4-8 weeks patient will report little to no difficulty using L UE for fine motor activities  2) In 4-8 weeks patient will demonstrate independence with HEP  Plan  Patient would benefit from: skilled physical therapy and skilled speech therapy  Referral necessary: No  Planned modality interventions: cryotherapy, TENS, thermotherapy: hydrocollator packs and unattended electrical stimulation  Planned therapy interventions: manual therapy, joint mobilization, massage, Khoury taping, patient education, postural training, self care, neuromuscular re-education, therapeutic exercise, strengthening, stretching, home exercise program, functional ROM exercises and flexibility  Frequency: 3x week  Duration in weeks: 4  Plan of Care beginning date: 12/10/2019  Plan of Care expiration date: 1/7/2020  Treatment plan discussed with: patient        Subjective Evaluation    History of Present Illness  Date of surgery: 10/29/2019  Mechanism of injury: Patient reports he had been having pain in his chest and L arm pain to fingertips  These sx had been going on for several months  Patient was given pain medication initially through pain management, however this was not successful at relieving the pain  Had MRI of c-spine and was referred to surgeon   Had surgery end of October, ACDF 6-C7  No complications with surgery  Since having the surgery, patient still c/o chest pains but not having spasms like he had before  He reports that his L UE is still sore and weak  Patient also c/o difficulty swallowing and has hoarsness to his voice that has been present and unchanged since his surgery  Intermittent N/T into L hand persists  Pain  Current pain ratin  At best pain ratin  At worst pain ratin  Quality: sharp    Social Support  Lives with: spouse    Employment status: not working (employed at [employer], physical job (lifting, digging), 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)  Hand dominance: right      Diagnostic Tests  X-ray: abnormal  MRI studies: abnormal  Treatments  Previous treatment: medication  Current treatment: medication  Patient Goals  Patient goals for therapy: decreased pain and increased strength          Objective     Concurrent Complaints  Positive for disturbed sleep, headaches and trouble swallowing  Negative for night pain, dizziness, faints, nausea/motion sickness, tinnitus, difficulty breathing, shortness of breath, respiratory pain and visual change    Postural Observations  Seated posture: poor  Standing posture: poor  Correction of posture: has no consistent effect        Palpation   Left   Tenderness of the pectoralis major, pectoralis minor and rhomboids  Tenderness   Cervical Spine   Tenderness in the sternum, left scapula, left transverse process, left ribs/costal cartilage and left ribs  No tenderness in the spinous process  Left Shoulder   Tenderness in the clavicle       Neurological Testing     Sensation   Cervical/Thoracic   Left   Diminished: light touch    Right   Intact: light touch    Reflexes   Left   Biceps (C5/C6): normal (2+)  Brachioradialis (C6): normal (2+)  Triceps (C7): trace (1+)    Right   Biceps (C5/C6): normal (2+)  Brachioradialis (C6): normal (2+)  Triceps (C7): trace (1+)    Active Range of Motion Cervical/Thoracic Spine       Cervical    Flexion: 40 degrees   Extension: 30 degrees      Left lateral flexion: 25 degrees      Right lateral flexion: 35 degrees      Left rotation: 40 degrees  Right rotation: 45 degrees         Thoracic    Flexion:  Restriction level: moderate  Extension:  Restriction level: moderate  Left rotation:  Restriction level: moderate  Right rotation:  Restriction level: moderate  Left Shoulder   Flexion: WFL  Abduction: WFL    Right Shoulder   Flexion: WFL  Abduction: WFL    Additional Active Range of Motion Details  "Pulling pain" reproduced with R cervical SB  Pain increases with cervical flexion  Pain increases with L thoracic rotation    Joint Play     Pain: 3rd rib, 4th rib and 5th rib     Strength/Myotome Testing   Cervical Spine     Left   Interossei strength (t1): 4+ and 4    Right   Interossei strength (t1): 5    Left Shoulder     Planes of Motion   Flexion: 4   Abduction: 4   External rotation at 0°:  4-   Internal rotation at 0°:  4-     Right Shoulder     Planes of Motion   Flexion: 5   Abduction: 5   External rotation at 0°:  5   Internal rotation at 0°:  5     Left Elbow   Flexion: 4  Extension: 4    Right Elbow   Flexion: 5  Extension: 5    Left Wrist/Hand   Wrist extension: 4  Wrist flexion: 4     (2nd hand position)     Trial 1: 15    Trial 2: 15    Trial 3: 15    Comments: Lbs of force    Right Wrist/Hand   Wrist extension: 5  Wrist flexion: 5     (2nd hand position)     Trial 1: 80    Trial 2: 80    Trial 3: 80    Comments: lbs of force    Additional Strength Details   strength testing: positioned seated with shoulder neutral, elbow flexed to 90 deg    Tests   Cervical   Negative vertical compression  Left   Negative Spurling's Test A  Right   Negative Spurling's Test A  Lumbar   Negative vertical compression  Neuro Exam:     Headaches   Patient reports headaches: Yes                Precautions: s/p anterior cervical discectomy fusion (ACDF) C6-7; PMH b/l TKA  Re-cert due 1/8/96  Exercise Diary  12/10       HEP instruction seated thoracic spine extensions and rotation       UBE 5 mins NV       TB MTP        TB LTP        TB IR        TB ER        Wall slides ITY        Biceps curls        TB triceps extensions        Digiflex gripping        Theraweb finger extension        Scap punches        Standing b/l shoulder flexion                                                                    Modalities  12/10       CP/MHP prn                          Patient was provided a home exercise program and demonstrated an understanding of exercises  Patient was advised to stop performing home exercise program if symptoms increase or new complaints developed  Verbal understanding demonstrated regarding home exercise program instructions

## 2019-12-11 ENCOUNTER — OFFICE VISIT (OUTPATIENT)
Dept: PHYSICAL THERAPY | Facility: CLINIC | Age: 59
End: 2019-12-11
Payer: COMMERCIAL

## 2019-12-11 DIAGNOSIS — Z98.1 S/P CERVICAL SPINAL FUSION: Primary | ICD-10-CM

## 2019-12-11 DIAGNOSIS — I48.0 PAROXYSMAL ATRIAL FIBRILLATION (HCC): ICD-10-CM

## 2019-12-11 PROCEDURE — 97110 THERAPEUTIC EXERCISES: CPT

## 2019-12-11 RX ORDER — METOPROLOL SUCCINATE 50 MG/1
TABLET, EXTENDED RELEASE ORAL
Qty: 30 TABLET | Refills: 0 | Status: SHIPPED | OUTPATIENT
Start: 2019-12-11 | End: 2020-01-21 | Stop reason: SDUPTHER

## 2019-12-11 NOTE — PROGRESS NOTES
Daily Note     Today's date: 2019  Patient name: Latanya Dowling  : 1960  MRN: [id number]  Referring provider: Tram Gasca MD  Dx:   Encounter Diagnosis     ICD-10-CM    1  S/P cervical spinal fusion Z98 1                   Subjective: "My neck feels okay  I have some pain in my L/S area snin in both legs " "My L arm is very weak "      Objective: See treatment diary below       Assessment: Tolerated treatment well  Initiated multiple new exercises as outlined in pt POC  No exacerbation of symptoms this date  Patient demonstrated fatigue post treatment and would benefit from continued PT      Plan: Continue per plan of care        Precautions: s/p anterior cervical discectomy fusion (ACDF) C6-7; PMH b/l TKA  Re-cert due 25  Exercise Diary  12/10 12/11      HEP instruction seated thoracic spine extensions and rotation       UBE 5 mins  RPM x5 min      TB MTP  Green TB 5" x20       TB LTP  Green TB 5" x20       TB IR  Green TB x20      TB ER  Green TB x20      Wall slides ITY  x15 ea      Biceps curls  Green TB x20       TB triceps extensions  Green TB x20       Digiflex gripping  5" x10 B/L      Theraweb finger extension  Yellow LUE 2" x20    Red RUE 2" x20      Scap punches        Standing b/l shoulder flexion  3# 2x10 B/L                                                                  Modalities  12/10 12/11      CP/MHP prn  Pt defers to HEP

## 2019-12-13 ENCOUNTER — TRANSCRIBE ORDERS (OUTPATIENT)
Dept: PHYSICAL THERAPY | Facility: CLINIC | Age: 59
End: 2019-12-13

## 2019-12-13 DIAGNOSIS — Z98.1 S/P CERVICAL SPINAL FUSION: Primary | ICD-10-CM

## 2019-12-16 ENCOUNTER — APPOINTMENT (OUTPATIENT)
Dept: PHYSICAL THERAPY | Facility: CLINIC | Age: 59
End: 2019-12-16
Payer: COMMERCIAL

## 2019-12-18 ENCOUNTER — OFFICE VISIT (OUTPATIENT)
Dept: PHYSICAL THERAPY | Facility: CLINIC | Age: 59
End: 2019-12-18
Payer: COMMERCIAL

## 2019-12-18 DIAGNOSIS — Z98.1 S/P CERVICAL SPINAL FUSION: Primary | ICD-10-CM

## 2019-12-18 PROCEDURE — 97110 THERAPEUTIC EXERCISES: CPT

## 2019-12-18 NOTE — PROGRESS NOTES
Daily Note     Today's date: 2019  Patient name: Yao Woodruff  : 1960  MRN: [id number]  Referring provider: Charlotte Colindres MD  Dx:   Encounter Diagnosis     ICD-10-CM    1  S/P cervical spinal fusion Z98 1        Start Time: 1555          Subjective: Pt reports he feels pretty good today  Denies neck pain , primarily c/o continued L chest discomfort and L UE weakness, curt L hand  Pt reports he also has L LE pain/tingling which doctor told him he will address once neck is healed  Objective: See treatment diary below      Assessment: Tolerated treatment well  Pt c/o LBP with standing TB exercises today  Patient exhibited good technique with therapeutic exercises and would benefit from continued PT       Plan: Continue per plan of care        Precautions: s/p anterior cervical discectomy fusion (ACDF) C6-7; PMH b/l TKA  Re-cert due 1/3/20  Exercise Diary  12/10 12/11 12/18     HEP instruction seated thoracic spine extensions and rotation       UBE 5 mins  RPM x5 min 120 rpm  x6 min     TB MTP  Green TB 5" x20  Green TB  5" x20     TB LTP  Green TB 5" x20  Green  TB  5" x20     TB IR  Green TB x20 Green TB  x20     TB ER  Green TB x20 Green TB       Wall slides ITY  x15 ea x15 ea     Biceps curls  Green TB x20  Green   TB x20     TB triceps extensions  Green TB x20  Green   TB x20     Digiflex gripping  5" x10 B/L Yellow L   5" x10  Red R   5" x10     Theraweb finger extension  Yellow LUE 2" x20    Red RUE 2" x20 Yellow LUE 2" x20    Red RUE 2" x20     Scap punches        Standing b/l shoulder flexion  3# 2x10 B/L 3# 2x10 B/L                                                                 Modalities  12/10 12/11 12/18     CP/MHP prn  Pt defers to HEP def

## 2019-12-19 ENCOUNTER — OFFICE VISIT (OUTPATIENT)
Dept: PHYSICAL THERAPY | Facility: CLINIC | Age: 59
End: 2019-12-19
Payer: COMMERCIAL

## 2019-12-19 DIAGNOSIS — Z98.1 S/P CERVICAL SPINAL FUSION: Primary | ICD-10-CM

## 2019-12-19 PROCEDURE — 97110 THERAPEUTIC EXERCISES: CPT

## 2019-12-19 NOTE — PROGRESS NOTES
Daily Note     Today's date: 2019  Patient name: Galo Gordon  : 1960  MRN: [id number]  Referring provider: Clara Potter MD  Dx:   Encounter Diagnosis     ICD-10-CM    1  S/P cervical spinal fusion Z98 1                   Subjective: Pt reports continued weakness in the LUE  Pt report that it is difficult to stand during some exercises secondary to LBP  Objective: See treatment diary below      Assessment: Tolerated treatment well  Pt better able to tolerate standing exercises this date  Patient demonstrated fatigue post treatment and would benefit from continued PT      Plan: Continue per plan of care        Precautions: s/p anterior cervical discectomy fusion (ACDF) C6-7; PMH b/l TKA  Re-cert due   Exercise Diary  12/10 12/11 12/18 12/19    HEP instruction seated thoracic spine extensions and rotation       UBE 5 mins  RPM x5 min 120 rpm  x6 min 120 rpm  x6 min    TB MTP  Green TB 5" x20  Green TB  5" x20 Green TB  5" x20    TB LTP  Green TB 5" x20  Green  TB  5" x20 Green TB  5" x20    TB IR  Green TB x20 Green TB  x20 Green TB  x20    TB ER  Green TB x20 Green TB   Green TB  x20    Wall slides ITY  x15 ea x15 ea x15 ea    Biceps curls  Green TB x20  Green TB  x20 Green TB  x20    TB triceps extensions  Green TB x20  Green   TB x20 Green TB  x20    Digiflex gripping  5" x10 B/L Yellow L   5" x10  Red R   5" x10 Green RUE 5" x20  Red LUE   5" x10    Theraweb finger extension  Yellow LUE 2" x20    Red RUE 2" x20 Yellow LUE 2" x20    Red RUE 2" x20 Yellow LUE 2" x20    Red RUE 2" x20    Scap punches    5" x20 with cane    Standing b/l shoulder flexion  3# 2x10 B/L 3# 2x10 B/L 3# 2x10 B/L                                                                Modalities  12/10 12/11 12/18 12/19    CP/MHP prn  Pt defers to HEP def

## 2019-12-23 ENCOUNTER — OFFICE VISIT (OUTPATIENT)
Dept: PHYSICAL THERAPY | Facility: CLINIC | Age: 59
End: 2019-12-23
Payer: COMMERCIAL

## 2019-12-23 DIAGNOSIS — Z98.1 S/P CERVICAL SPINAL FUSION: Primary | ICD-10-CM

## 2019-12-23 PROCEDURE — 97110 THERAPEUTIC EXERCISES: CPT | Performed by: PHYSICAL THERAPIST

## 2019-12-23 NOTE — PROGRESS NOTES
Daily Note     Today's date: 2019  Patient name: Korin Collins  : 1960  MRN: [id number]  Referring provider: Jie Riojas MD  Dx:   Encounter Diagnosis     ICD-10-CM    1  S/P cervical spinal fusion Z98 1                   Subjective: Patient reports no significant changes since beginning PT  Objective: See treatment diary below      Assessment: Tolerated treatment well  Slight progressions made with strength training this date  Overall patient still demonstrates greater weakness in L UE vs R  Patient demonstrated fatigue post treatment, exhibited good technique with therapeutic exercises and would benefit from continued PT      Plan: Continue per plan of care  Progress treatment as tolerated         Precautions: s/p anterior cervical discectomy fusion (ACDF) C6-7; PMH b/l TKA  Re-cert due 85  Exercise Diary  12/10 12/11 12/18 12/19 12/23   HEP instruction seated thoracic spine extensions and rotation       UBE 5 mins  RPM x5 min 120 rpm  x6 min 120 rpm  x6 min 120 rpm 5' fwd/5' retro   TB MTP  Green TB 5" x20  Green TB  5" x20 Green TB  5" x20 Green TB 5" x20 ea   TB LTP  Green TB 5" x20  Green  TB  5" x20 Green TB  5" x20 Green TB 5" x20 ea   TB IR  Green TB x20 Green TB  x20 Green TB  x20 Green TB x20 ea   TB ER  Green TB x20 Green TB   Green TB  x20 Green TB x20 ea   Wall slides ITY  x15 ea x15 ea x15 ea x15 ea   Biceps curls  Green TB x20  Green TB  x20 Green TB  x20 5# 30x   TB triceps extensions  Green TB x20  Green   TB x20 Green TB  x20 Green TB x20   Digiflex gripping  5" x10 B/L Yellow L   5" x10  Red R   5" x10 Green RUE 5" x20  Red LUE   5" x10 Green 5" x2 mins   Theraweb finger extension  Yellow LUE 2" x20    Red RUE 2" x20 Yellow LUE 2" x20    Red RUE 2" x20 Yellow LUE 2" x20    Red RUE 2" x20 Yellow 2" x20   Scap punches    5" x20 with cane 5" x20 3#    Standing b/l shoulder flexion  3# 2x10 B/L 3# 2x10 B/L 3# 2x10 B/L 3# 2x10 B/L Modalities  12/10 12/11 12/18 12/19 12/23   CP/MHP prn  Pt defers to HEP def

## 2019-12-27 ENCOUNTER — OFFICE VISIT (OUTPATIENT)
Dept: PHYSICAL THERAPY | Facility: CLINIC | Age: 59
End: 2019-12-27
Payer: COMMERCIAL

## 2019-12-27 DIAGNOSIS — Z98.1 S/P CERVICAL SPINAL FUSION: Primary | ICD-10-CM

## 2019-12-27 PROCEDURE — 97110 THERAPEUTIC EXERCISES: CPT | Performed by: PHYSICAL THERAPIST

## 2019-12-27 NOTE — PROGRESS NOTES
Daily Note     Today's date: 2019  Patient name: Maurice Pinto  : 1960  MRN: [id number]  Referring provider: Renea Griffin MD  Dx:   Encounter Diagnosis     ICD-10-CM    1  S/P cervical spinal fusion Z98 1                   Subjective: " I feel pretty good today "       Objective: See treatment diary below      Assessment: Tolerated treatment well  Patient exhibited good technique with therapeutic exercises and would benefit from continued PT  Plan: Continue per plan of care        Precautions: s/p anterior cervical discectomy fusion (ACDF) C6-7; PMH b/l TKA  Re-cert due 18  Exercise Diary     HEP instruction         RPM x5 min 120 RPM x5 min 120 rpm  x6 min 120 rpm  x6 min 120 rpm 5' fwd/5' retro   TB MTP Green TB 5" x20  Green TB 5" x20  Green TB  5" x20 Green TB  5" x20 Green TB 5" x20 ea   TB LTP Green TB 5" x20  Green TB 5" x20  Green  TB  5" x20 Green TB  5" x20 Green TB 5" x20 ea   TB IR Green TB  x20  Green TB x20 Green TB  x20 Green TB  x20 Green TB x20 ea   TB ER Green TB  x20  Green TB x20 Green TB   Green TB  x20 Green TB x20 ea   Wall slides ITY x15 x15 ea x15 ea x15 ea x15 ea   Biceps curls 5# x30 Green TB x20  Green TB  x20 Green TB  x20 5# 30x   TB triceps extensions Green TB x20  Green TB x20  Green   TB x20 Green TB  x20 Green TB x20   Digiflex gripping Green 5" x2 mins 5" x10 B/L Yellow L   5" x10  Red R   5" x10 Green RUE 5" x20  Red LUE   5" x10 Green 5" x2 mins   Theraweb finger extension Yellow 2" x20 Yellow LUE 2" x20    Red RUE 2" x20 Yellow LUE 2" x20    Red RUE 2" x20 Yellow LUE 2" x20    Red RUE 2" x20 Yellow 2" x20   Scap punches 5" x20 3#   5" x20 with cane 5" x20 3#    Standing b/l shoulder flexion 3# 2x10 B/L 3# 2x10 B/L 3# 2x10 B/L 3# 2x10 B/L 3# 2x10 B/L                                                               Modalities  12/10 12/11 12/18 12/19 12/23   CP/MHP prn  Pt defers to HEP def

## 2019-12-31 ENCOUNTER — OFFICE VISIT (OUTPATIENT)
Dept: PHYSICAL THERAPY | Facility: CLINIC | Age: 59
End: 2019-12-31
Payer: COMMERCIAL

## 2019-12-31 DIAGNOSIS — Z98.1 S/P CERVICAL SPINAL FUSION: Primary | ICD-10-CM

## 2019-12-31 PROCEDURE — 97110 THERAPEUTIC EXERCISES: CPT

## 2019-12-31 NOTE — PROGRESS NOTES
Daily Note     Today's date: 2019  Patient name: Pio Gómez  : 1960  MRN: [id number]  Referring provider: Lisa Rodriguez MD  Dx:   Encounter Diagnosis     ICD-10-CM    1  S/P cervical spinal fusion Z98 1                   Subjective: Pt reports L chest soreness continues just not "spasming" as it was before surgery  Objective: See treatment diary below      Assessment: Tolerated treatment well  Pt noted increased L chest soreness with standing B shld flex, decreased reps today  Patient demonstrated fatigue post treatment, exhibited good technique with therapeutic exercises and would benefit from continued PT      Plan: Continue per plan of care        Precautions: s/p anterior cervical discectomy fusion (ACDF) C6-7; PMH b/l TKA  Re-cert due 88  Exercise Diary     HEP instruction         RPM x5 min 120 rpm 5'fwd/  5'retro 120 rpm  x6 min 120 rpm  x6 min 120 rpm 5' fwd/5' retro   TB MTP Green TB 5" x20  Green   TB 5" x20 Green TB  5" x20 Green TB  5" x20 Green TB 5" x20 ea   TB LTP Green TB 5" x20  Green   TB   5" x20 Green  TB  5" x20 Green TB  5" x20 Green TB 5" x20 ea   TB IR Green TB  x20  Green TB   x20 Green TB  x20 Green TB  x20 Green TB x20 ea   TB ER Green TB  x20  Green TB   x20 Green TB   Green TB  x20 Green TB x20 ea   Wall slides ITY x15 x15 ea x15 ea x15 ea x15 ea   Biceps curls 5# x30 5# x30 Green TB  x20 Green TB  x20 5# 30x   TB triceps extensions Green TB x20  Green   TB x20 Green   TB x20 Green TB  x20 Green TB x20   Digiflex gripping Green 5" x2 mins Green 5" x2 min Yellow L   5" x10  Red R   5" x10 Green RUE 5" x20  Red LUE   5" x10 Green 5" x2 mins   Theraweb finger extension Yellow 2" x20 Yellow   2" x20 Yellow LUE 2" x20    Red RUE 2" x20 Yellow LUE 2" x20    Red RUE 2" x20 Yellow 2" x20   Scap punches 5" x20 3# 3# 5" x20  5" x20 with cane 5" x20 3#    Standing b/l shoulder flexion 3# 2x10 B/L 3# x10   B/L 3# 2x10 B/L 3# 2x10 B/L 3# 2x10 B/L                                                               Modalities  12/10 12/31 12/18 12/19 12/23   CP/MHP prn   def

## 2020-01-01 NOTE — LETTER
January 21, 2019     Patient: Gertrude Wills   YOB: 1960   Date of Visit: 1/21/2019       To Whom it May Concern:    Gertrude Wills was seen in my clinic on 1/21/2019  Please excuse January 17th through January 22nd  If you have any questions or concerns, please don't hesitate to call           Sincerely,          Kitty Henson DO        CC: No Recipients Report received from day shift RN. Orders reviewed and MAR verified. 12 hour chart check complete.

## 2020-01-03 ENCOUNTER — APPOINTMENT (OUTPATIENT)
Dept: PHYSICAL THERAPY | Facility: CLINIC | Age: 60
End: 2020-01-03
Payer: COMMERCIAL

## 2020-01-06 ENCOUNTER — APPOINTMENT (OUTPATIENT)
Dept: PHYSICAL THERAPY | Facility: CLINIC | Age: 60
End: 2020-01-06
Payer: COMMERCIAL

## 2020-01-09 ENCOUNTER — EVALUATION (OUTPATIENT)
Dept: PHYSICAL THERAPY | Facility: CLINIC | Age: 60
End: 2020-01-09
Payer: COMMERCIAL

## 2020-01-09 DIAGNOSIS — Z98.1 S/P CERVICAL SPINAL FUSION: Primary | ICD-10-CM

## 2020-01-09 PROCEDURE — 97140 MANUAL THERAPY 1/> REGIONS: CPT | Performed by: PHYSICAL THERAPIST

## 2020-01-09 PROCEDURE — 97535 SELF CARE MNGMENT TRAINING: CPT | Performed by: PHYSICAL THERAPIST

## 2020-01-09 NOTE — PROGRESS NOTES
PT Progress Note     Today's date: 2020  Patient name: Mario Ennis  : 1960  MRN: [id number]  Referring provider: Eloisa Coyne MD  Dx:   Encounter Diagnosis     ICD-10-CM    1  S/P cervical spinal fusion Z98 1                   Assessment  Mario Ennis has attended a total of 6 f/u visits after initial evaluation  He has  demonstrated increased L UE strength since starting physical therapy services  While strength has improved, patient is still experiencing L UE pain and increasing muscle spasms  He continues to present with pain, decreased strength, decreased range of motion, and decreased activity tolerance  Exam s/s consistent with elevated first rib and cervical radiculopathy as pain is produced with palpation to anterior chest wall at first rib and with ipsilateral cervical rotation and lateral flexion  As muscle spasms are increasing, patient has been advised to reschedule appt with MD to earlier date  Patient will be placed on hold from PT at this time as his symptoms are worsening over time  Impairments: abnormal or restricted ROM, activity intolerance, impaired physical strength, lacks appropriate home exercise program and pain with function  Understanding of Dx/Px/POC: good   Prognosis: good    Goals  STGs  1) In 4 weeks, patient will report 50% reduction in pain - not met  2) In 4 weeks, patient will demonstrate 1/2 grade or higher increase in L UE strength during MMT - MET  3) In 4 weeks, patient will demonstrate improved L  strength to reach 50% of R UE  strength - MET    LTGs  1) In 4-8 weeks patient will report little to no difficulty using L UE for fine motor activities  2) In 4-8 weeks patient will demonstrate independence with HEP       Plan  Will hold PT until patient has f/u with MD  Patient would benefit from: skilled physical therapy and skilled speech therapy  Referral necessary: No  Planned modality interventions: cryotherapy, TENS, thermotherapy: hydrocollator packs and unattended electrical stimulation  Planned therapy interventions: manual therapy, joint mobilization, massage, Khoury taping, patient education, postural training, self care, neuromuscular re-education, therapeutic exercise, strengthening, stretching, home exercise program, functional ROM exercises and flexibility  Frequency: 3x week  Duration in weeks: 4  Plan of Care beginning date: 2020  Plan of Care expiration date: 2020  Treatment plan discussed with: patient        Subjective Evaluation    History of Present Illness  Date of surgery: 10/29/2019  Mechanism of injury: Patient reports he had been having pain in his chest and L arm pain to fingertips  These sx had been going on for several months  Patient was given pain medication initially through pain management, however this was not successful at relieving the pain  Had MRI of c-spine and was referred to surgeon  Had surgery end of October, ACDF 6-C7  No complications with surgery  Since having the surgery, patient still c/o chest pains but not having spasms like he had before  He reports that his L UE is still sore and weak  Patient also c/o difficulty swallowing and has hoarsness to his voice that has been present and unchanged since his surgery  Intermittent N/T into L hand persists  Progress Note 2020:   Patient reports his muscle spasms are returning, almost to where he was before he had surgery  Patient notes improved strength in L UE but is still having the pain       Pain  IE    :  Current pain ratin  5  At best pain ratin  1  At worst pain ratin  8  Quality: sharp    Social Support  Lives with: spouse    Employment status: not working (employed at [employer], physical job (lifting, digging), 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)  Hand dominance: right      Diagnostic Tests  X-ray: abnormal  MRI studies: abnormal  Treatments  Previous treatment: medication  Current treatment: medication  Patient Goals  Patient goals for therapy: decreased pain and increased strength          Objective     Concurrent Complaints  Positive for disturbed sleep, headaches and trouble swallowing  Negative for night pain, dizziness, faints, nausea/motion sickness, tinnitus, difficulty breathing, shortness of breath, respiratory pain and visual change    Postural Observations  Seated posture: poor  Standing posture: poor  Correction of posture: has no consistent effect        Palpation   Left   Tenderness of the pectoralis major, pectoralis minor and rhomboids  Tenderness   Cervical Spine   Tenderness in the sternum, left scapula, left transverse process, left ribs/costal cartilage and left ribs  No tenderness in the spinous process  Left Shoulder   Tenderness in the clavicle       Neurological Testing     Sensation   Cervical/Thoracic   Left   Diminished: light touch    Right   Intact: light touch    Reflexes   Left   Biceps (C5/C6): normal (2+)  Brachioradialis (C6): normal (2+)  Triceps (C7): trace (1+)    Right   Biceps (C5/C6): normal (2+)  Brachioradialis (C6): normal (2+)  Triceps (C7): trace (1+)    Active Range of Motion   Cervical/Thoracic Spine   Cervical  IE     1/9:    Flexion: 40 degrees    40 deg  Extension: 30 degrees   38 deg  Left lateral flexion: 25 degrees  10 deg  Right lateral flexion: 35 degrees  35 deg  Left rotation: 40 degrees  30 deg  Right rotation: 45 degrees   55 deg        Thoracic    Flexion:  Restriction level: moderate  Extension:  Restriction level: moderate  Left rotation:  Restriction level: moderate  Right rotation:  Restriction level: moderate  Left Shoulder   Flexion: WFL  Abduction: WFL    Right Shoulder   Flexion: WFL  Abduction: WFL    Initial evaluation additional Active Range of Motion Details  "Pulling pain" reproduced with R cervical SB  Pain increases with cervical flexion  Pain increases with L thoracic rotation    Progress Note 1/9/20: Pain reproduced with cervical flexion, extension, L SB and L rot    Joint Play     Pain: 3rd rib, 4th rib and 5th rib     Strength/Myotome Testing  IE    1/9/2020:   Cervical Spine     Left   Interossei strength (t1): 4+ and 4  Right   Interossei strength (t1): 5    IE    1/9/2020:   Left Shoulder   Planes of Motion   Flexion: 4    4+  Abduction: 4    4+  External rotation at 0°: 4-  4+  Internal rotation at 0°: 4-  4+    Right Shoulder   Planes of Motion   Flexion: 5   Abduction: 5   External rotation at 0°: 5   Internal rotation at 0°: 5     IE    1/9/2020:   Left Elbow   Flexion: 4   5  Extension: 4   5    Right Elbow   Flexion: 5  Extension: 5    Left Wrist/Hand   Wrist extension: 4  4+  Wrist flexion: 4  4+     (2nd hand position)   IE    1/9/20:     Trial 1: 15   40     Trial 2: 15   35    Trial 3: 15   35    Comments: Lbs of force    Right Wrist/Hand   Wrist extension: 5  Wrist flexion: 5     (2nd hand position)     Trial 1: 80    Trial 2: 80    Trial 3: 80    Comments: lbs of force    Additional Strength Details   strength testing: positioned seated with shoulder neutral, elbow flexed to 90 deg    Tests   Cervical   Negative vertical compression  Left   Negative Spurling's Test A  Right   Negative Spurling's Test A  Lumbar   Negative vertical compression       Neuro Exam:     Headaches   Patient reports headaches: Yes  - resolved 1/9/20       Precautions: s/p anterior cervical discectomy fusion (ACDF) C6-7; PMH b/l TKA  Re-cert due 1/2/26  Exercise Diary  12/27 12/31 1/9 12/19 12/23   HEP instruction   L shoulder isometrics, pec stretch doorway      RPM x5 min 120 rpm 5'fwd/  5'retro  120 rpm  x6 min 120 rpm 5' fwd/5' retro   TB MTP Green TB 5" x20  Green   TB 5" x20  Green TB  5" x20 Green TB 5" x20 ea   TB LTP Green TB 5" x20  Green   TB   5" x20  Green TB  5" x20 Green TB 5" x20 ea   TB IR Green TB  x20  Green TB   x20  Green TB  x20 Green TB x20 ea   TB ER Green TB  x20  Green TB   x20  Green TB  x20 Green TB x20 ea   Wall slides ITY x15 x15 ea  x15 ea x15 ea   Biceps curls 5# x30 5# x30  Green TB  x20 5# 30x   TB triceps extensions Green TB x20  Green   TB x20  Green TB  x20 Green TB x20   Digiflex gripping Green 5" x2 mins Green 5" x2 min  Green RUE 5" x20  Red LUE   5" x10 Green 5" x2 mins   Theraweb finger extension Yellow 2" x20 Yellow   2" x20  Yellow LUE 2" x20    Red RUE 2" x20 Yellow 2" x20   Scap punches 5" x20 3# 3# 5" x20  5" x20 with cane 5" x20 3#    Standing b/l shoulder flexion 3# 2x10 B/L 3# x10   B/L  3# 2x10 B/L 3# 2x10 B/L   L shoulder isometrics   Flex/abd/ext/add  5" x10 ea     Pec stretch doorway   10"x10     1st rib mobilization with belt   5"x10     1st rib mob deep breathing   x10                     Manuals   L shoulder ext PROM, 1st rib mob, seated T/S PA mob         Modalities  12/10 12/31 1/9 12/19 12/23   CP/MHP prn   def                         Patient was provided a home exercise program and demonstrated an understanding of exercises  Patient was advised to stop performing home exercise program if symptoms increase or new complaints developed  Verbal understanding demonstrated regarding home exercise program instructions

## 2020-01-09 NOTE — LETTER
January 10, 2020    Guera Eddy MD  [id number]  [address] [facility]    Patient: Maurice Pinto   YOB: 1960   Date of Visit: 2020     Encounter Diagnosis     ICD-10-CM    1  S/P cervical spinal fusion Z98 1        Dear Dr Johnson Poole:    Thank you for your recent referral of Maurice Pinto  Please review the attached evaluation summary from Rufus's recent visit  Please verify that you agree with the plan of care by signing the attached order  If you have any questions or concerns, please do not hesitate to call  I sincerely appreciate the opportunity to share in the care of one of your patients and hope to have another opportunity to work with you in the near future  Sincerely,    Stefanie Segovia, PT      Referring Provider:      I certify that I have read the below Plan of Care and certify the need for these services furnished under this plan of treatment while under my care  Guera Eddy MD  [address]  [facility] [id number]  VIA Facsimile: [phone]          PT Progress Note     Today's date: 2020  Patient name: Maurice Pinto  : 1960  MRN: [id number]  Referring provider: Renea Griffin MD  Dx:   Encounter Diagnosis     ICD-10-CM    1  S/P cervical spinal fusion Z98 1                   Assessment  Maurice Pinto has attended a total of 6 f/u visits after initial evaluation  He has  demonstrated increased L UE strength since starting physical therapy services  While strength has improved, patient is still experiencing L UE pain and increasing muscle spasms  He continues to present with pain, decreased strength, decreased range of motion, and decreased activity tolerance  Exam s/s consistent with elevated first rib and cervical radiculopathy as pain is produced with palpation to anterior chest wall at first rib and with ipsilateral cervical rotation and lateral flexion   As muscle spasms are increasing, patient has been advised to reschedule appt with MD to earlier date  Patient will be placed on hold from PT at this time as his symptoms are worsening over time  Impairments: abnormal or restricted ROM, activity intolerance, impaired physical strength, lacks appropriate home exercise program and pain with function  Understanding of Dx/Px/POC: good   Prognosis: good    Goals  STGs  1) In 4 weeks, patient will report 50% reduction in pain - not met  2) In 4 weeks, patient will demonstrate 1/2 grade or higher increase in L UE strength during MMT - MET  3) In 4 weeks, patient will demonstrate improved L  strength to reach 50% of R UE  strength - MET    LTGs  1) In 4-8 weeks patient will report little to no difficulty using L UE for fine motor activities  2) In 4-8 weeks patient will demonstrate independence with HEP  Plan  Will hold PT until patient has f/u with MD  Patient would benefit from: skilled physical therapy and skilled speech therapy  Referral necessary: No  Planned modality interventions: cryotherapy, TENS, thermotherapy: hydrocollator packs and unattended electrical stimulation  Planned therapy interventions: manual therapy, joint mobilization, massage, Khoury taping, patient education, postural training, self care, neuromuscular re-education, therapeutic exercise, strengthening, stretching, home exercise program, functional ROM exercises and flexibility  Frequency: 3x week  Duration in weeks: 4  Plan of Care beginning date: 1/9/2020  Plan of Care expiration date: 2/6/2020  Treatment plan discussed with: patient        Subjective Evaluation    History of Present Illness  Date of surgery: 10/29/2019  Mechanism of injury: Patient reports he had been having pain in his chest and L arm pain to fingertips  These sx had been going on for several months  Patient was given pain medication initially through pain management, however this was not successful at relieving the pain   Had MRI of c-spine and was referred to surgeon  Had surgery end of October, ACDF 6-C7  No complications with surgery  Since having the surgery, patient still c/o chest pains but not having spasms like he had before  He reports that his L UE is still sore and weak  Patient also c/o difficulty swallowing and has hoarsness to his voice that has been present and unchanged since his surgery  Intermittent N/T into L hand persists  Progress Note 2020:   Patient reports his muscle spasms are returning, almost to where he was before he had surgery  Patient notes improved strength in L UE but is still having the pain  Pain  IE    :  Current pain ratin  5  At best pain ratin  1  At worst pain ratin  8  Quality: sharp    Social Support  Lives with: spouse    Employment status: not working (employed at [employer], physical job (lifting, digging), 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)  Hand dominance: right      Diagnostic Tests  X-ray: abnormal  MRI studies: abnormal  Treatments  Previous treatment: medication  Current treatment: medication  Patient Goals  Patient goals for therapy: decreased pain and increased strength          Objective     Concurrent Complaints  Positive for disturbed sleep, headaches and trouble swallowing  Negative for night pain, dizziness, faints, nausea/motion sickness, tinnitus, difficulty breathing, shortness of breath, respiratory pain and visual change    Postural Observations  Seated posture: poor  Standing posture: poor  Correction of posture: has no consistent effect        Palpation   Left   Tenderness of the pectoralis major, pectoralis minor and rhomboids  Tenderness   Cervical Spine   Tenderness in the sternum, left scapula, left transverse process, left ribs/costal cartilage and left ribs  No tenderness in the spinous process  Left Shoulder   Tenderness in the clavicle       Neurological Testing     Sensation   Cervical/Thoracic   Left   Diminished: light touch    Right   Intact: light touch    Reflexes   Left   Biceps (C5/C6): normal (2+)  Brachioradialis (C6): normal (2+)  Triceps (C7): trace (1+)    Right   Biceps (C5/C6): normal (2+)  Brachioradialis (C6): normal (2+)  Triceps (C7): trace (1+)    Active Range of Motion   Cervical/Thoracic Spine   Cervical  IE     1/9:    Flexion: 40 degrees    40 deg  Extension: 30 degrees   38 deg  Left lateral flexion: 25 degrees  10 deg  Right lateral flexion: 35 degrees  35 deg  Left rotation: 40 degrees  30 deg  Right rotation: 45 degrees   55 deg        Thoracic    Flexion:  Restriction level: moderate  Extension:  Restriction level: moderate  Left rotation:  Restriction level: moderate  Right rotation:  Restriction level: moderate  Left Shoulder   Flexion: WFL  Abduction: WFL    Right Shoulder   Flexion: WFL  Abduction: WFL    Initial evaluation additional Active Range of Motion Details  "Pulling pain" reproduced with R cervical SB  Pain increases with cervical flexion  Pain increases with L thoracic rotation    Progress Note 1/9/20: Pain reproduced with cervical flexion, extension, L SB and L rot    Joint Play     Pain: 3rd rib, 4th rib and 5th rib     Strength/Myotome Testing  IE    1/9/2020:   Cervical Spine     Left   Interossei strength (t1): 4+ and 4  Right   Interossei strength (t1): 5    IE    1/9/2020:   Left Shoulder   Planes of Motion   Flexion: 4    4+  Abduction: 4    4+  External rotation at 0°: 4-  4+  Internal rotation at 0°: 4-  4+    Right Shoulder   Planes of Motion   Flexion: 5   Abduction: 5   External rotation at 0°: 5   Internal rotation at 0°: 5     IE    1/9/2020:   Left Elbow   Flexion: 4   5  Extension: 4   5    Right Elbow   Flexion: 5  Extension: 5    Left Wrist/Hand   Wrist extension: 4  4+  Wrist flexion: 4  4+     (2nd hand position)   IE    1/9/20:     Trial 1: 15   40     Trial 2: 15   35    Trial 3: 15   35    Comments: Lbs of force    Right Wrist/Hand   Wrist extension: 5  Wrist flexion: 5     (2nd hand position)     Trial 1: 80    Trial 2: 80    Trial 3: 80    Comments: lbs of force    Additional Strength Details   strength testing: positioned seated with shoulder neutral, elbow flexed to 90 deg    Tests   Cervical   Negative vertical compression  Left   Negative Spurling's Test A  Right   Negative Spurling's Test A  Lumbar   Negative vertical compression       Neuro Exam:     Headaches   Patient reports headaches: Yes  - resolved 1/9/20       Precautions: s/p anterior cervical discectomy fusion (ACDF) C6-7; PMH b/l TKA  Re-cert due 0/8/56  Exercise Diary  12/27 12/31 1/9 12/19 12/23   HEP instruction   L shoulder isometrics, pec stretch doorway      RPM x5 min 120 rpm 5'fwd/  5'retro  120 rpm  x6 min 120 rpm 5' fwd/5' retro   TB MTP Green TB 5" x20  Green   TB 5" x20  Green TB  5" x20 Green TB 5" x20 ea   TB LTP Green TB 5" x20  Green   TB   5" x20  Green TB  5" x20 Green TB 5" x20 ea   TB IR Green TB  x20  Green TB   x20  Green TB  x20 Green TB x20 ea   TB ER Green TB  x20  Green TB   x20  Green TB  x20 Green TB x20 ea   Wall slides ITY x15 x15 ea  x15 ea x15 ea   Biceps curls 5# x30 5# x30  Green TB  x20 5# 30x   TB triceps extensions Green TB x20  Green   TB x20  Green TB  x20 Green TB x20   Digiflex gripping Green 5" x2 mins Green 5" x2 min  Green RUE 5" x20  Red LUE   5" x10 Green 5" x2 mins   Theraweb finger extension Yellow 2" x20 Yellow   2" x20  Yellow LUE 2" x20    Red RUE 2" x20 Yellow 2" x20   Scap punches 5" x20 3# 3# 5" x20  5" x20 with cane 5" x20 3#    Standing b/l shoulder flexion 3# 2x10 B/L 3# x10   B/L  3# 2x10 B/L 3# 2x10 B/L   L shoulder isometrics   Flex/abd/ext/add  5" x10 ea     Pec stretch doorway   10"x10     1st rib mobilization with belt   5"x10     1st rib mob deep breathing   x10                     Manuals   L shoulder ext PROM, 1st rib mob, seated T/S PA mob         Modalities  12/10 12/31 1/9 12/19 12/23   CP/MHP prn   def                         Patient was provided a home exercise program and demonstrated an understanding of exercises  Patient was advised to stop performing home exercise program if symptoms increase or new complaints developed  Verbal understanding demonstrated regarding home exercise program instructions

## 2020-01-10 ENCOUNTER — TRANSCRIBE ORDERS (OUTPATIENT)
Dept: PHYSICAL THERAPY | Facility: CLINIC | Age: 60
End: 2020-01-10

## 2020-01-10 DIAGNOSIS — Z98.1 S/P CERVICAL SPINAL FUSION: Primary | ICD-10-CM

## 2020-01-21 DIAGNOSIS — I48.0 PAROXYSMAL ATRIAL FIBRILLATION (HCC): ICD-10-CM

## 2020-01-21 RX ORDER — METOPROLOL SUCCINATE 50 MG/1
50 TABLET, EXTENDED RELEASE ORAL DAILY
Qty: 30 TABLET | Refills: 5 | Status: SHIPPED | OUTPATIENT
Start: 2020-01-21 | End: 2020-07-01 | Stop reason: SDUPTHER

## 2020-02-10 ENCOUNTER — OFFICE VISIT (OUTPATIENT)
Dept: CARDIOLOGY CLINIC | Facility: CLINIC | Age: 60
End: 2020-02-10
Payer: COMMERCIAL

## 2020-02-10 VITALS
HEART RATE: 52 BPM | WEIGHT: 310 LBS | SYSTOLIC BLOOD PRESSURE: 110 MMHG | BODY MASS INDEX: 46.98 KG/M2 | DIASTOLIC BLOOD PRESSURE: 70 MMHG | HEIGHT: 68 IN

## 2020-02-10 DIAGNOSIS — I48.0 PAROXYSMAL ATRIAL FIBRILLATION (HCC): ICD-10-CM

## 2020-02-10 DIAGNOSIS — I71.2 THORACIC AORTIC ANEURYSM WITHOUT RUPTURE (HCC): Primary | ICD-10-CM

## 2020-02-10 DIAGNOSIS — E78.5 DYSLIPIDEMIA: ICD-10-CM

## 2020-02-10 DIAGNOSIS — I10 ESSENTIAL (PRIMARY) HYPERTENSION: Chronic | ICD-10-CM

## 2020-02-10 PROCEDURE — 93000 ELECTROCARDIOGRAM COMPLETE: CPT | Performed by: INTERNAL MEDICINE

## 2020-02-10 PROCEDURE — 99214 OFFICE O/P EST MOD 30 MIN: CPT | Performed by: INTERNAL MEDICINE

## 2020-02-10 RX ORDER — ATORVASTATIN CALCIUM 10 MG/1
10 TABLET, FILM COATED ORAL DAILY
Qty: 30 TABLET | Refills: 5 | Status: SHIPPED | OUTPATIENT
Start: 2020-02-10 | End: 2020-07-01 | Stop reason: SDUPTHER

## 2020-02-10 RX ORDER — PREGABALIN 150 MG/1
75 CAPSULE ORAL 2 TIMES DAILY
COMMUNITY

## 2020-02-10 NOTE — PROGRESS NOTES
Subjective:        Patient ID: [place] Cespedes is a 61 y o  male  Chief Complaint:  Gary Riding is here for routine Follow-up  He only has reproducible left anterior chest wall pain over his Pec major muscle  No injury  No palpable deformity but clearly reproducible with left upper extremity movement, exercises and digital palpation He actually thinks better since his last orthopedic surgery  He does not have any exertionally mediated chest pains  EKG sinus rhythm/bradycardia 52 beats per minute otherwise normal     Recent lipid profile reviewed suboptimal     The following portions of the patient's history were reviewed and updated as appropriate: allergies, current medications, past family history, past medical history, past social history, past surgical history and problem list   Review of Systems   Constitution: Negative for chills, diaphoresis, malaise/fatigue and weight gain  HENT: Negative for nosebleeds and stridor  Eyes: Negative for double vision, vision loss in left eye, vision loss in right eye and visual disturbance  Cardiovascular: Negative for chest pain, claudication, cyanosis, dyspnea on exertion, irregular heartbeat, leg swelling, near-syncope, orthopnea, palpitations, paroxysmal nocturnal dyspnea and syncope  Respiratory: Negative for cough, shortness of breath, snoring and wheezing  Endocrine: Negative for polydipsia, polyphagia and polyuria  Hematologic/Lymphatic: Negative for bleeding problem  Does not bruise/bleed easily  Skin: Negative for flushing and rash  Musculoskeletal: Positive for arthritis, back pain, neck pain and stiffness  Negative for falls and myalgias  Gastrointestinal: Negative for abdominal pain, heartburn, hematemesis, hematochezia, melena and nausea  Genitourinary: Negative for hematuria  Neurological: Negative for brief paralysis, dizziness, focal weakness, headaches, light-headedness, loss of balance and vertigo     Psychiatric/Behavioral: Negative for altered mental status and substance abuse  Allergic/Immunologic: Negative for hives  Objective:      /70   Pulse (!) 52   Ht 5' 8" (1 727 m)   Wt (!) 141 kg (310 lb)   BMI 47 14 kg/m²   Physical Exam   Constitutional: He is oriented to person, place, and time  He appears well-developed and well-nourished  No distress  HENT:   Head: Normocephalic and atraumatic  Eyes: Pupils are equal, round, and reactive to light  EOM are normal  No scleral icterus  Neck: Normal range of motion  Neck supple  No JVD present  No thyromegaly present  Cardiovascular: Normal rate, regular rhythm and normal heart sounds  Exam reveals no gallop and no friction rub  No murmur heard  Pulmonary/Chest: Effort normal and breath sounds normal  No stridor  No respiratory distress  He has no wheezes  He has no rales  Abdominal: Soft  Bowel sounds are normal  He exhibits no distension and no mass  There is no tenderness  Musculoskeletal: Normal range of motion  He exhibits no edema or deformity  Neurological: He is alert and oriented to person, place, and time  Coordination normal    Skin: Skin is warm and dry  No erythema  No pallor  Psychiatric: He has a normal mood and affect   His behavior is normal        Lab Review:   Admission on 10/07/2019, Discharged on 10/08/2019   Component Date Value    WBC 10/07/2019 5 70     RBC 10/07/2019 5 62     Hemoglobin 10/07/2019 16 4     Hematocrit 10/07/2019 48 1*    MCV 10/07/2019 86     MCH 10/07/2019 29 3     MCHC 10/07/2019 34 2     RDW 10/07/2019 15 1*    MPV 10/07/2019 7 3*    Platelets 74/66/3032 166     Neutrophils Relative 10/07/2019 66     Lymphocytes Relative 10/07/2019 19*    Monocytes Relative 10/07/2019 10     Eosinophils Relative 10/07/2019 4     Basophils Relative 10/07/2019 1     Neutrophils Absolute 10/07/2019 3 80     Lymphocytes Absolute 10/07/2019 1 10     Monocytes Absolute 10/07/2019 0 60     Eosinophils Absolute 10/07/2019 0 20     Basophils Absolute 10/07/2019 0 00     Sodium 10/07/2019 137     Potassium 10/07/2019 4 2     Chloride 10/07/2019 102     CO2 10/07/2019 28     ANION GAP 10/07/2019 7     BUN 10/07/2019 18     Creatinine 10/07/2019 0 78     Glucose 10/07/2019 99     Calcium 10/07/2019 10 0     AST 10/07/2019 17     ALT 10/07/2019 19     Alkaline Phosphatase 10/07/2019 39*    Total Protein 10/07/2019 7 8     Albumin 10/07/2019 4 5     Total Bilirubin 10/07/2019 0 60     eGFR 10/07/2019 99     Troponin I 10/07/2019 0 03     Troponin I 10/07/2019 <0 03     TSH 3RD GENERATON 10/07/2019 1 440     Troponin I 10/07/2019 <0 03     Troponin I 10/07/2019 <0 03     Troponin I 10/07/2019 <0 03     Ventricular Rate 10/07/2019 69     Atrial Rate 10/07/2019 69     MA Interval 10/07/2019 140     QRSD Interval 10/07/2019 90     QT Interval 10/07/2019 396     QTC Interval 10/07/2019 424     P Axis 10/07/2019 60     QRS Axis 10/07/2019 51     T Wave Axis 10/07/2019 75     Sodium 10/08/2019 139     Potassium 10/08/2019 4 1     Chloride 10/08/2019 105     CO2 10/08/2019 29     ANION GAP 10/08/2019 5     BUN 10/08/2019 20     Creatinine 10/08/2019 0 78     Glucose 10/08/2019 105*    Calcium 10/08/2019 9 2     AST 10/08/2019 13     ALT 10/08/2019 15     Alkaline Phosphatase 10/08/2019 35*    Total Protein 10/08/2019 6 6     Albumin 10/08/2019 3 8     Total Bilirubin 10/08/2019 0 50     eGFR 10/08/2019 99     Magnesium 10/08/2019 1 9     WBC 10/08/2019 6 10     RBC 10/08/2019 4 97     Hemoglobin 10/08/2019 14 5     Hematocrit 10/08/2019 42 5     MCV 10/08/2019 86     MCH 10/08/2019 29 2     MCHC 10/08/2019 34 1     RDW 10/08/2019 15 1*    MPV 10/08/2019 7 6*    Platelets 27/75/9921 161     Neutrophils Relative 10/08/2019 64     Lymphocytes Relative 10/08/2019 21     Monocytes Relative 10/08/2019 11     Eosinophils Relative 10/08/2019 4     Basophils Relative 10/08/2019 1     Neutrophils Absolute 10/08/2019 3 90     Lymphocytes Absolute 10/08/2019 1 30     Monocytes Absolute 10/08/2019 0 70     Eosinophils Absolute 10/08/2019 0 20     Basophils Absolute 10/08/2019 0 10     Cholesterol 10/08/2019 182     Triglycerides 10/08/2019 73     HDL, Direct 10/08/2019 26*    LDL Calculated 10/08/2019 141*    Ventricular Rate 10/08/2019 57     Atrial Rate 10/08/2019 57     UT Interval 10/08/2019 160     QRSD Interval 10/08/2019 98     QT Interval 10/08/2019 460     QTC Interval 10/08/2019 447     P Axis 10/08/2019 58     QRS Axis 10/08/2019 47     T Wave Axis 10/08/2019 51     Ventricular Rate 10/08/2019 55     Atrial Rate 10/08/2019 55     UT Interval 10/08/2019 164     QRSD Interval 10/08/2019 98     QT Interval 10/08/2019 454     QTC Interval 10/08/2019 434     P Axis 10/08/2019 59     QRS Axis 10/08/2019 40     T Wave Axis 10/08/2019 47     Ventricular Rate 10/07/2019 67     Atrial Rate 10/07/2019 67     UT Interval 10/07/2019 160     QRSD Interval 10/07/2019 82     QT Interval 10/07/2019 398     QTC Interval 10/07/2019 420     P Axis 10/07/2019 74     QRS Axis 10/07/2019 56     T Wave Balko 10/07/2019 66    Hospital Outpatient Visit on 09/20/2019   Component Date Value    Protocol Name 09/20/2019 GLORY SIT     Time In Exercise Phase 09/20/2019 00:03:00     MAX   SYSTOLIC BP 83/27/4269 788     Max Diastolic Bp 53/23/7018 84     Max Heart Rate 09/20/2019 80     Max Predicted Heart Rate 09/20/2019 162     Reason for Termination 09/20/2019 Test Complete     Test Indication 09/20/2019 EVAL KNOWN CAD     Target Hr Formular 09/20/2019 (220 - Age)*85%     Arrhy During Ex 09/20/2019 none     Chest Pain Statement 09/20/2019 none    Admission on 08/29/2019, Discharged on 08/29/2019   Component Date Value    WBC 08/29/2019 5 60     RBC 08/29/2019 5 14     Hemoglobin 08/29/2019 15 1     Hematocrit 08/29/2019 43 5     MCV 08/29/2019 85     MCH 08/29/2019 29 3  MCHC 08/29/2019 34 7     RDW 08/29/2019 15 2*    MPV 08/29/2019 7 4*    Platelets 40/63/4537 152     Neutrophils Relative 08/29/2019 63     Lymphocytes Relative 08/29/2019 17*    Monocytes Relative 08/29/2019 14*    Eosinophils Relative 08/29/2019 6*    Basophils Relative 08/29/2019 1     Neutrophils Absolute 08/29/2019 3 50     Lymphocytes Absolute 08/29/2019 1 00     Monocytes Absolute 08/29/2019 0 80     Eosinophils Absolute 08/29/2019 0 30     Basophils Absolute 08/29/2019 0 00     Sodium 08/29/2019 138     Potassium 08/29/2019 4 2     Chloride 08/29/2019 104     CO2 08/29/2019 29     ANION GAP 08/29/2019 5     BUN 08/29/2019 18     Creatinine 08/29/2019 0 90     Glucose 08/29/2019 88     Calcium 08/29/2019 9 4     eGFR 08/29/2019 94     Troponin I 08/29/2019 <0 03     D-Dimer, Quant 08/29/2019 172     Protime 08/29/2019 13 1*    INR 08/29/2019 1 13     PTT 08/29/2019 34    Appointment on 08/29/2019   Component Date Value    Ventricular Rate 08/29/2019 61     Atrial Rate 08/29/2019 61     PA Interval 08/29/2019 152     QRSD Interval 08/29/2019 94     QT Interval 08/29/2019 428     QTC Interval 08/29/2019 430     P Axis 08/29/2019 7     QRS Axis 08/29/2019 30     T Wave Axis 08/29/2019 70    Office Visit on 08/29/2019   Component Date Value    Ventricular Rate 08/29/2019 62     Atrial Rate 08/29/2019 62     PA Interval 08/29/2019 146     QRSD Interval 08/29/2019 98     QT Interval 08/29/2019 432     QTC Interval 08/29/2019 438     P Axis 08/29/2019 24     QRS Axis 08/29/2019 23     T Wave Axis 08/29/2019 52      No results found  Assessment:       1  Thoracic aortic aneurysm without rupture ([address] )  CTA chest wo w contrast   2  Paroxysmal atrial fibrillation (HCC)  POCT ECG   3  Dyslipidemia  atorvastatin (LIPITOR) 10 mg tablet    AST    LDL cholesterol, direct    ALT   4   Essential (primary) hypertension          Plan:       I reassured Andie Lorenzo that his chest pain syndrome is clearly musculoskeletal in etiology and not consistent with coronary ischemia  Recent echocardiogram and stress testing unremarkable with similar complaints  He should see you and or Orthopedics for this issue  He remains in sinus rhythm, EKG stable  Lipids suboptimal   With newly diagnosed thoracic aortic aneurysm, which is asymptomatic, reviewed the natural progression of such and that surgery is not warranted this time  Continue beta-blocker therapy  I strongly recommended we better his dyslipidemia, I would like to both improve his HDL and lower his LDL cholesterol, he understands  I started atorvastatin ordered repeat LDL direct and transaminases in April  I ordered a CTA of his chest attention aorta with and without contrast in April, we will likely repeat this again 6 months later  Is stable then we will recheck this yearly  We could not see his ascending aorta on last echocardiography  I will see him back in 6 months otherwise he will call with any problems with the new medication

## 2020-02-10 NOTE — PATIENT INSTRUCTIONS
Thoracic Aortic Aneurysm   WHAT YOU NEED TO KNOW:   What is a thoracic aortic aneurysm? A thoracic aortic aneurysm (TAA) is a bulge in your aorta that occurs when the aorta's walls are weakened  The aorta is a large blood vessel that extends from your heart down the center of your chest and into your abdomen  What causes a TAA? · Atherosclerosis: This is hardening of the arteries  Hardening occurs when fatty deposits, called plaque, build up along the walls of your arteries  · Abnormal development:  A part of your heart and aorta may not have developed normally  · Inflammation:  You may have inflammation in the walls of your aorta from an inflammatory disease or certain infections, such as syphilis  · Loss of elasticity:  As you age, blood vessels in the body may lose some of their elasticity  It may be worsened by long-term increased blood pressure  Certain disorders also can make the walls of your arteries lose elasticity  · Trauma:  Injury, particularly on the chest area, may lead to a partial or complete cut in the aorta  What increases my risk of a TAA? · Cigarette smoking    · High blood pressure    · A close family member has had a TAA    · Being male and older than 72years of age    · Diabetes     · Being overweight  What are the signs and symptoms of a TAA? You may have no signs or symptoms  The following are common when signs and symptoms do occur:  · Chest, neck, back, or abdominal pain    · Cough or blood in sputum    · Hoarseness    · Trouble breathing, which may be affected by position    · Trouble swallowing    · Wheezing  How is a TAA diagnosed? You may need more than one of the following tests  You may be given contrast dye before some tests to help the pictures show up better  Tell the healthcare provider if you have ever had an allergic reaction to contrast dye  · X-rays: These show your lungs, heart, aorta, and other parts around them   An x-ray of the aorta with dye is called an aortogram or aortography  · CT scan: This test is also called a CAT scan  An x-ray machine uses a computer to take pictures of your chest and blood vessels  The pictures may show a TAA  · MRI:  This scan uses powerful magnets and a computer to take pictures of your chest and blood vessels  An MRI may show a TAA  Do not enter the MRI room with anything metal  Metal can cause serious injury  Tell the healthcare provider if you have any metal in or on your body  · Transesophageal echocardiogram:        ¨ A transesophageal echocardiogram (DEJUAN) is a type of ultrasound that shows pictures of the size and shape of your heart  It also looks at how your heart moves when it is beating  These pictures are seen on a TV-like screen  You may need a DEJUAN if your heart does not show up very well in a regular echocardiogram  You may also need a DEJUAN to check for certain problems such as blood clots or infection inside the heart  ¨ You will be given medicine to relax you during a DEJUAN  Caregivers put a tube in your mouth that is moved down into your esophagus (food pipe)  The tube has a small ultrasound sensor on the end  Since your esophagus is right next to your heart, your caregiver can see your heart clearly  How is a TAA treated? · Medicines: You may be given blood pressure or cholesterol medicine to help stop your TAA from growing  · Repair:  Healthcare providers may cut out the aneurysm and replace the cut area with an artificial tube  If the aortic valve (flap) also has a problem, it may also be replaced  · Stenting:  A stent (tube) may be put in the portion of the aorta with aneurysm  The stent may strengthen your aorta and reduce pressure on the wall of your aorta  What are the risks of a TAA? Surgery may damage to your spinal cord, or cause you to bleed or get an infection  Too much blood loss can cause your organs to fail, such as your kidneys  You may need to have surgery again   If untreated, TAA may cause more serious problems  The aneurysm may get larger, burst, and be life-threatening  How can I manage my symptoms? · Check your blood pressure as directed:  Keep a record of your blood pressure and bring it with you to follow-up visits  Ask your healthcare provider what your blood pressure should be and how to check it  High blood pressure can make your aneurysm worse  · Exercise:  Ask your healthcare provider to help you create an exercise plan  This may help lower blood pressure  · Eat a variety of healthy foods:  Healthy foods include fruits, vegetables, whole-grain breads, low-fat dairy products, beans, lean meats, and fish  Ask if you need to be on a special diet  · Do not smoke: If you smoke, it is never too late to quit  Smoking increases your risk for TAA and heart disease  Ask your healthcare provider for information if you need help quitting  When should I contact my healthcare provider? · You have a fever  · You have new symptoms since your last appointment  · Your symptoms prevent you from doing your daily activities  · You have questions or concerns about your condition or care  When should I seek immediate care or call 911? · You have nausea and are vomiting  · You have sudden chest, neck, back, or abdominal pain  · Your skin becomes pale and sweaty, or you feel very weak  · You are dizzy, or you faint  CARE AGREEMENT:   You have the right to help plan your care  Learn about your health condition and how it may be treated  Discuss treatment options with your caregivers to decide what care you want to receive  You always have the right to refuse treatment  The above information is an  only  It is not intended as medical advice for individual conditions or treatments  Talk to your doctor, nurse or pharmacist before following any medical regimen to see if it is safe and effective for you    © 2017 [employer] [address] Information is for End User's use only and may not be sold, redistributed or otherwise used for commercial purposes  All illustrations and images included in CareNotes® are the copyrighted property of [employer]  or [employer]

## 2020-02-18 NOTE — PROGRESS NOTES
Episode of care is being DC'd at this time  Patient has not returned after last appt with his surgeon

## 2020-05-05 DIAGNOSIS — I20.8 STABLE ANGINA PECTORIS (HCC): Chronic | ICD-10-CM

## 2020-05-05 DIAGNOSIS — I10 ESSENTIAL (PRIMARY) HYPERTENSION: Primary | Chronic | ICD-10-CM

## 2020-05-05 DIAGNOSIS — R00.2 PALPITATIONS: ICD-10-CM

## 2020-05-06 ENCOUNTER — APPOINTMENT (OUTPATIENT)
Dept: LAB | Facility: CLINIC | Age: 60
End: 2020-05-06
Payer: COMMERCIAL

## 2020-05-06 LAB
ANION GAP SERPL CALCULATED.3IONS-SCNC: 5 MMOL/L (ref 4–13)
BUN SERPL-MCNC: 15 MG/DL (ref 5–25)
CALCIUM SERPL-MCNC: 9.1 MG/DL (ref 8.3–10.1)
CHLORIDE SERPL-SCNC: 105 MMOL/L (ref 100–108)
CO2 SERPL-SCNC: 29 MMOL/L (ref 21–32)
CREAT SERPL-MCNC: 0.78 MG/DL (ref 0.6–1.3)
GFR SERPL CREATININE-BSD FRML MDRD: 99 ML/MIN/1.73SQ M
GLUCOSE SERPL-MCNC: 104 MG/DL (ref 65–140)
POTASSIUM SERPL-SCNC: 4.5 MMOL/L (ref 3.5–5.3)
SODIUM SERPL-SCNC: 139 MMOL/L (ref 136–145)

## 2020-05-06 PROCEDURE — 80048 BASIC METABOLIC PNL TOTAL CA: CPT

## 2020-05-06 PROCEDURE — 36415 COLL VENOUS BLD VENIPUNCTURE: CPT

## 2020-05-11 ENCOUNTER — HOSPITAL ENCOUNTER (OUTPATIENT)
Dept: CT IMAGING | Facility: HOSPITAL | Age: 60
Discharge: HOME | End: 2020-05-11
Attending: INTERNAL MEDICINE
Payer: COMMERCIAL

## 2020-05-11 DIAGNOSIS — I71.2 THORACIC AORTIC ANEURYSM WITHOUT RUPTURE (HCC): ICD-10-CM

## 2020-05-11 PROCEDURE — 71275 CT ANGIOGRAPHY CHEST: CPT

## 2020-05-11 RX ADMIN — IOHEXOL 100 ML: 350 INJECTION, SOLUTION INTRAVENOUS at 15:10

## 2020-05-21 DIAGNOSIS — R91.1 PULMONARY NODULE: Primary | ICD-10-CM

## 2020-07-01 DIAGNOSIS — E78.5 DYSLIPIDEMIA: ICD-10-CM

## 2020-07-01 DIAGNOSIS — I48.0 PAROXYSMAL ATRIAL FIBRILLATION (HCC): ICD-10-CM

## 2020-07-01 RX ORDER — METOPROLOL SUCCINATE 50 MG/1
50 TABLET, EXTENDED RELEASE ORAL DAILY
Qty: 30 TABLET | Refills: 5 | Status: SHIPPED | OUTPATIENT
Start: 2020-07-01 | End: 2021-01-12

## 2020-07-01 RX ORDER — ATORVASTATIN CALCIUM 10 MG/1
10 TABLET, FILM COATED ORAL DAILY
Qty: 30 TABLET | Refills: 5 | Status: SHIPPED | OUTPATIENT
Start: 2020-07-01 | End: 2021-01-12 | Stop reason: SDUPTHER

## 2020-09-07 ENCOUNTER — APPOINTMENT (EMERGENCY)
Dept: RADIOLOGY | Facility: HOSPITAL | Age: 60
End: 2020-09-07
Payer: COMMERCIAL

## 2020-09-07 ENCOUNTER — HOSPITAL ENCOUNTER (EMERGENCY)
Facility: HOSPITAL | Age: 60
Discharge: HOME | End: 2020-09-07
Attending: EMERGENCY MEDICINE
Payer: COMMERCIAL

## 2020-09-07 DIAGNOSIS — R05.9 COUGH: ICD-10-CM

## 2020-09-07 DIAGNOSIS — J40 BRONCHITIS: ICD-10-CM

## 2020-09-07 DIAGNOSIS — R09.81 NASAL CONGESTION: ICD-10-CM

## 2020-09-07 DIAGNOSIS — J06.9 VIRAL URI WITH COUGH: Primary | ICD-10-CM

## 2020-09-07 DIAGNOSIS — J06.9 URI (UPPER RESPIRATORY INFECTION): ICD-10-CM

## 2020-09-07 DIAGNOSIS — R06.02 SHORTNESS OF BREATH: ICD-10-CM

## 2020-09-07 LAB
ANION GAP SERPL CALCULATED.3IONS-SCNC: 6 MMOL/L (ref 4–13)
BASOPHILS # BLD AUTO: 0 THOUSANDS/ΜL (ref 0–0.1)
BASOPHILS NFR BLD AUTO: 1 % (ref 0–2)
BUN SERPL-MCNC: 13 MG/DL (ref 7–25)
CALCIUM SERPL-MCNC: 8.8 MG/DL (ref 8.6–10.5)
CHLORIDE SERPL-SCNC: 104 MMOL/L (ref 98–107)
CO2 SERPL-SCNC: 27 MMOL/L (ref 21–31)
CREAT SERPL-MCNC: 0.69 MG/DL (ref 0.7–1.3)
EOSINOPHIL # BLD AUTO: 0.2 THOUSAND/ΜL (ref 0–0.61)
EOSINOPHIL NFR BLD AUTO: 3 % (ref 0–5)
ERYTHROCYTE [DISTWIDTH] IN BLOOD BY AUTOMATED COUNT: 14.4 % (ref 11.5–14.5)
FLUAV RNA NPH QL NAA+PROBE: NORMAL
FLUBV RNA NPH QL NAA+PROBE: NORMAL
GFR SERPL CREATININE-BSD FRML MDRD: 104 ML/MIN/1.73SQ M
GLUCOSE SERPL-MCNC: 105 MG/DL (ref 65–99)
HCT VFR BLD AUTO: 44.8 % (ref 42–47)
HGB BLD-MCNC: 15.6 G/DL (ref 14–18)
LYMPHOCYTES # BLD AUTO: 0.4 THOUSANDS/ΜL (ref 0.6–4.47)
LYMPHOCYTES NFR BLD AUTO: 6 % (ref 21–51)
MCH RBC QN AUTO: 30 PG (ref 26–34)
MCHC RBC AUTO-ENTMCNC: 34.8 G/DL (ref 31–37)
MCV RBC AUTO: 86 FL (ref 81–99)
MONOCYTES # BLD AUTO: 0.6 THOUSAND/ΜL (ref 0.17–1.22)
MONOCYTES NFR BLD AUTO: 8 % (ref 2–12)
NEUTROPHILS # BLD AUTO: 5.7 THOUSANDS/ΜL (ref 1.4–6.5)
NEUTS SEG NFR BLD AUTO: 82 % (ref 42–75)
PLATELET # BLD AUTO: 141 THOUSANDS/UL (ref 149–390)
PMV BLD AUTO: 7.7 FL (ref 8.6–11.7)
POTASSIUM SERPL-SCNC: 4.1 MMOL/L (ref 3.5–5.5)
RBC # BLD AUTO: 5.19 MILLION/UL (ref 4.3–5.9)
RSV RNA NPH QL NAA+PROBE: NORMAL
SODIUM SERPL-SCNC: 137 MMOL/L (ref 134–143)
TROPONIN I SERPL-MCNC: <0.03 NG/ML
WBC # BLD AUTO: 7 THOUSAND/UL (ref 4.8–10.8)

## 2020-09-07 PROCEDURE — 99284 EMERGENCY DEPT VISIT MOD MDM: CPT

## 2020-09-07 PROCEDURE — 96361 HYDRATE IV INFUSION ADD-ON: CPT

## 2020-09-07 PROCEDURE — 80048 BASIC METABOLIC PNL TOTAL CA: CPT | Performed by: EMERGENCY MEDICINE

## 2020-09-07 PROCEDURE — 85025 COMPLETE CBC W/AUTO DIFF WBC: CPT | Performed by: EMERGENCY MEDICINE

## 2020-09-07 PROCEDURE — 84484 ASSAY OF TROPONIN QUANT: CPT | Performed by: EMERGENCY MEDICINE

## 2020-09-07 PROCEDURE — 93005 ELECTROCARDIOGRAM TRACING: CPT

## 2020-09-07 PROCEDURE — 87631 RESP VIRUS 3-5 TARGETS: CPT | Performed by: EMERGENCY MEDICINE

## 2020-09-07 PROCEDURE — 99285 EMERGENCY DEPT VISIT HI MDM: CPT | Performed by: EMERGENCY MEDICINE

## 2020-09-07 PROCEDURE — 71045 X-RAY EXAM CHEST 1 VIEW: CPT

## 2020-09-07 PROCEDURE — 36415 COLL VENOUS BLD VENIPUNCTURE: CPT | Performed by: EMERGENCY MEDICINE

## 2020-09-07 PROCEDURE — 96374 THER/PROPH/DIAG INJ IV PUSH: CPT

## 2020-09-07 PROCEDURE — U0003 INFECTIOUS AGENT DETECTION BY NUCLEIC ACID (DNA OR RNA); SEVERE ACUTE RESPIRATORY SYNDROME CORONAVIRUS 2 (SARS-COV-2) (CORONAVIRUS DISEASE [COVID-19]), AMPLIFIED PROBE TECHNIQUE, MAKING USE OF HIGH THROUGHPUT TECHNOLOGIES AS DESCRIBED BY CMS-2020-01-R: HCPCS | Performed by: EMERGENCY MEDICINE

## 2020-09-07 RX ORDER — ALBUTEROL SULFATE 90 UG/1
2 AEROSOL, METERED RESPIRATORY (INHALATION) ONCE
Status: COMPLETED | OUTPATIENT
Start: 2020-09-07 | End: 2020-09-07

## 2020-09-07 RX ORDER — KETOROLAC TROMETHAMINE 30 MG/ML
15 INJECTION, SOLUTION INTRAMUSCULAR; INTRAVENOUS ONCE
Status: COMPLETED | OUTPATIENT
Start: 2020-09-07 | End: 2020-09-07

## 2020-09-07 RX ORDER — FLUTICASONE PROPIONATE 50 MCG
1 SPRAY, SUSPENSION (ML) NASAL ONCE
Status: COMPLETED | OUTPATIENT
Start: 2020-09-07 | End: 2020-09-07

## 2020-09-07 RX ADMIN — SODIUM CHLORIDE 500 ML: 0.9 INJECTION, SOLUTION INTRAVENOUS at 13:36

## 2020-09-07 RX ADMIN — ALBUTEROL SULFATE 2 PUFF: 90 AEROSOL, METERED RESPIRATORY (INHALATION) at 14:29

## 2020-09-07 RX ADMIN — KETOROLAC TROMETHAMINE 15 MG: 30 INJECTION, SOLUTION INTRAMUSCULAR at 13:36

## 2020-09-07 RX ADMIN — FLUTICASONE PROPIONATE 1 SPRAY: 50 SPRAY, METERED NASAL at 13:36

## 2020-09-07 NOTE — ED PROVIDER NOTES
History  Chief Complaint   Patient presents with    Cough     HPI    61 y o  M w/ hx of afib only on asa s/p ablation, HTN HLD obesity, presenting to the ED for dionicio of sob and cough  Patient states his symptoms began this AM  He denies chest pain  He states he has had aches, chills but no fevers  He has yellowish phlegm  He also has congestion  Hasn't tried anything for his symptoms  No nausea vomiting or abd pain  No sick contacts  Denies copd, smoked 26 years ago  Prior to Admission Medications   Prescriptions Last Dose Informant Patient Reported? Taking? ALPRAZolam (XANAX) 1 mg tablet   Yes No   Sig: Take 1 mg by mouth daily at bedtime   FLUoxetine (PROzac) 20 mg capsule   Yes No   Sig: Take 40 mg by mouth daily In the morning   UNKNOWN TO PATIENT   Yes No   aspirin (ECOTRIN) 325 mg EC tablet   Yes No   Sig: Take 325 mg by mouth   atorvastatin (LIPITOR) 10 mg tablet   No No   Sig: Take 1 tablet (10 mg total) by mouth daily   busPIRone (BUSPAR) 5 mg tablet   Yes No   Sig: take 2 tablets in the morning and 1 tablet at night   busPIRone (BUSPAR) 5 mg tablet   Yes No   Sig: take 2 tablets by oral route in the morning and 1 tablet in the evening  metoprolol succinate (TOPROL-XL) 50 mg 24 hr tablet   No No   Sig: Take 1 tablet (50 mg total) by mouth daily   pregabalin (LYRICA) 75 mg capsule   Yes No   Sig: Every 12 hours   senna-docusate sodium (SENOKOT S) 8 6-50 mg per tablet   Yes No   Sig: Take 1 tablet by mouth 2 (two) times a day as needed   zolpidem (AMBIEN) 5 mg tablet   Yes No   Sig: Take 5 mg by mouth daily at bedtime as needed for sleep        Facility-Administered Medications: None       Past Medical History:   Diagnosis Date    Anxiety     Arthritis     Athscl heart disease of native coronary artery w/o ang pctrs     Atrial fibrillation ([facility] [address] ) 2012 and 2016    CPAP (continuous positive airway pressure) dependence     Diverticulitis     Edema     Legs and hands    Essential tremor     Hard of hearing     Hx of cardiovascular stress test 04/28/2016    EF0 50 (50%) normal LVSF  No evidence for prior ischemia or MI    Hx of echocardiogram 05/18/2016    EF0 55 (55%) severely technically limited suboptimal study  No significant valvular insufficiency or stenosis   / EF0 60 (60%) Trace MR and TR 6/22/17  / EF0 65 (65%) technically difficult study  no hemodynamically significant valve disease identified  11/30/17      Hypercholesteremia     Hypertension     Mixed hyperlipidemia     Morbid obesity ([address] )     Obesity     Panic attacks     Rash     On extremeties and chest  Chronic, recurrent    Sleep apnea     Varicose veins of left lower extremity     Bilateral       Past Surgical History:   Procedure Laterality Date    ABLATION RADIO FREQUENCY ATRIAL (MAZE/CRYOABLATION)  02/08/2018    Cryoablation lesion summary, 10 applications were delivered   CARDIAC CATHETERIZATION  11/2012    No sig CAD  6/26/17    COLONOSCOPY      JOINT REPLACEMENT Bilateral     knees    NH TOTAL KNEE ARTHROPLASTY Bilateral 6/1/2016    Procedure: ARTHROPLASTY KNEE TOTAL BILATERAL;  Surgeon: Dimple Freed MD;  Location: [facility];  Service: Orthopedics    SPINE SURGERY  2019    ACDF C6-7       Family History   Problem Relation Age of Onset    Coronary artery disease Family         less than 61 yrs of age   [facility] Heart attack Mother         MI    Heart attack Sister         MI    Heart attack Brother         MI     I have reviewed and agree with the history as documented  E-Cigarette/Vaping     E-Cigarette/Vaping Substances     Social History     Tobacco Use    Smoking status: Former Smoker     Packs/day: 1 00     Years: 18 00     Pack years: 18 00     Types: Cigarettes    Smokeless tobacco: Never Used    Tobacco comment: Quit 21 years ago   Substance Use Topics    Alcohol use: Yes     Comment: socially    Drug use: No       Review of Systems   Constitutional: Positive for chills and fatigue  Negative for fever  HENT: Positive for congestion  Negative for nosebleeds and sore throat  Eyes: Negative for redness and visual disturbance  Respiratory: Positive for cough and shortness of breath  Negative for wheezing  Cardiovascular: Negative for chest pain and palpitations  Gastrointestinal: Negative for abdominal pain and diarrhea  Endocrine: Negative for polyuria  Genitourinary: Negative for difficulty urinating and testicular pain  Musculoskeletal: Negative for back pain and neck stiffness  Skin: Negative for rash and wound  Neurological: Negative for seizures, speech difficulty and headaches  Psychiatric/Behavioral: Negative for dysphoric mood and hallucinations  All other systems reviewed and are negative  Physical Exam  Physical Exam  Vitals signs and nursing note reviewed  Constitutional:       Appearance: He is well-developed  HENT:      Head: Normocephalic and atraumatic  Right Ear: External ear normal       Left Ear: External ear normal       Mouth/Throat:      Comments: oropharynx clear without erythema or exudate  Eyes:      Conjunctiva/sclera: Conjunctivae normal    Neck:      Musculoskeletal: Normal range of motion  Cardiovascular:      Rate and Rhythm: Normal rate and regular rhythm  Heart sounds: Normal heart sounds  Pulmonary:      Effort: Pulmonary effort is normal       Breath sounds: Normal breath sounds  No wheezing  Comments: 96% on RA  Chest:      Chest wall: No tenderness  Abdominal:      General: Bowel sounds are normal       Palpations: Abdomen is soft  Tenderness: There is no abdominal tenderness  There is no guarding  Musculoskeletal: Normal range of motion  Skin:     General: Skin is warm and dry  Findings: No rash  Neurological:      Mental Status: He is alert and oriented to person, place, and time  Cranial Nerves: No cranial nerve deficit  Sensory: No sensory deficit  Motor: No abnormal muscle tone  Coordination: Coordination normal          Vital Signs  ED Triage Vitals [09/07/20 1233]   Temperature Pulse Respirations Blood Pressure SpO2   98 6 °F (37 °C) 71 18 (!) 183/81 98 %      Temp Source Heart Rate Source Patient Position - Orthostatic VS BP Location FiO2 (%)   Temporal Monitor Sitting Right arm --      Pain Score       No Pain           Vitals:    09/07/20 1300 09/07/20 1330 09/07/20 1400 09/07/20 1430   BP:       Pulse: 69 70 62 63   Patient Position - Orthostatic VS:             Visual Acuity      ED Medications  Medications   sodium chloride 0 9 % bolus 500 mL (0 mL Intravenous Stopped 9/7/20 1449)   fluticasone (FLONASE) 50 mcg/act nasal spray 1 spray (1 spray Each Nare Given 9/7/20 1336)   ketorolac (TORADOL) injection 15 mg (15 mg Intravenous Given 9/7/20 1336)   albuterol (PROVENTIL HFA,VENTOLIN HFA) inhaler 2 puff (2 puffs Inhalation Given 9/7/20 1429)       Diagnostic Studies  Results Reviewed     Procedure Component Value Units Date/Time    Influenza A/B and RSV PCR [[id number]]  (Normal) Collected:  09/07/20 1302    Lab Status:  Final result Specimen:  Nasopharyngeal Swab Updated:  09/07/20 1349     INFLUENZA A PCR None Detected     INFLUENZA B PCR None Detected     RSV PCR None Detected    Basic metabolic panel [[id number]]  (Abnormal) Collected:  09/07/20 1302    Lab Status:  Final result Specimen:  Blood from Arm, Right Updated:  09/07/20 1335     Sodium 137 mmol/L      Potassium 4 1 mmol/L      Chloride 104 mmol/L      CO2 27 mmol/L      ANION GAP 6 mmol/L      BUN 13 mg/dL      Creatinine 0 69 mg/dL      Glucose 105 mg/dL      Calcium 8 8 mg/dL      eGFR 104 ml/min/1 73sq m     Narrative:       [place] guidelines for Chronic Kidney Disease (CKD):     Stage 1 with normal or high GFR (GFR > 90 mL/min/1 73 square meters)    Stage 2 Mild CKD (GFR = 60-89 mL/min/1 73 square meters)    Stage 3A Moderate CKD (GFR = 45-59 mL/min/1 73 square meters)    Stage 3B Moderate CKD (GFR = 30-44 mL/min/1 73 square meters)    Stage 4 Severe CKD (GFR = 15-29 mL/min/1 73 square meters)    Stage 5 End Stage CKD (GFR <15 mL/min/1 73 square meters)  Note: GFR calculation is accurate only with a steady state creatinine    Troponin I [[id number]]  (Normal) Collected:  09/07/20 1302    Lab Status:  Final result Specimen:  Blood from Arm, Right Updated:  09/07/20 1333     Troponin I <0 03 ng/mL     CBC and differential [[id number]]  (Abnormal) Collected:  09/07/20 1302    Lab Status:  Final result Specimen:  Blood from Arm, Right Updated:  09/07/20 1317     WBC 7 00 Thousand/uL      RBC 5 19 Million/uL      Hemoglobin 15 6 g/dL      Hematocrit 44 8 %      MCV 86 fL      MCH 30 0 pg      MCHC 34 8 g/dL      RDW 14 4 %      MPV 7 7 fL      Platelets 311 Thousands/uL      Neutrophils Relative 82 %      Lymphocytes Relative 6 %      Monocytes Relative 8 %      Eosinophils Relative 3 %      Basophils Relative 1 %      Neutrophils Absolute 5 70 Thousands/µL      Lymphocytes Absolute 0 40 Thousands/µL      Monocytes Absolute 0 60 Thousand/µL      Eosinophils Absolute 0 20 Thousand/µL      Basophils Absolute 0 00 Thousands/µL     Novel Coronavirus (COVID-19), PCR [facility] [[id number]] Collected:  09/07/20 1302    Lab Status: In process Specimen:  Nasopharyngeal Swab Updated:  09/07/20 1307                 XR chest 1 view portable   Final Result by Husam Spears MD (09/07 1336)      No acute cardiopulmonary disease  Workstation performed: [id number]                    Procedures  Procedures         ED Course  ED Course as of Sep 07 1550   [facility] Sep 07, 2020   1351 ECG 12 Lead Documentation  Date/Time: today/date: 9/7/2020  Performed by: Angela Golden  Authorized by:  Angela Golden    ECG reviewed by me, the ED Provider: yes    Patient location:  ED   Previous ECG: If available: no  Rate:  ECG rate assessment: normal    Rhythm: sinus rhythm    Ectopy: none    QRS axis:  Normal  Intervals: normal Q waves: None   ST segments:  No acute ST changes  T waves: inverted v1 avr      Impression: Normal Sinus EKG              US AUDIT      Most Recent Value   Initial Alcohol Screen: US AUDIT-C    1  How often do you have a drink containing alcohol?  0 Filed at: 09/07/2020 1253   2  How many drinks containing alcohol do you have on a typical day you are drinking? 0 Filed at: 09/07/2020 1253   3a  Male UNDER 65: How often do you have five or more drinks on one occasion? 0 Filed at: 09/07/2020 1253   3b  FEMALE Any Age, or MALE 65+: How often do you have 4 or more drinks on one occassion? 0 Filed at: 09/07/2020 1237   Audit-C Score  0 Filed at: 09/07/2020 1253                  DENISE/DAST-10      Most Recent Value   How many times in the past year have you    Used an illegal drug or used a prescription medication for non-medical reasons? Never Filed at: 09/07/2020 1254                                MDM  42-year-old male who presents to the ED for evaluation of cold-like symptoms  Given his cardiac history, with respect his intermittent shortness of breath, will get cardiac workup as well  The patient denies any chest pain  Likely is shortness of breath secondary due to his intermittent coughing and cold-like symptoms  Patient had no desaturations, is able to ambulate without any difficulty  Will do an outpatient COVID screening as well  Patient is influenza negative, lab work unremarkable, chest x-ray shows no pneumonia  The patient likely has upper respiratory infection  The patient was treated symptomatically with Flonase for his congestion, as well as a inhaler for bronchitis  The patient was given strict return precautions, will have repeat PCP evaluation this week  The patient was instructed to follow up as documented  Strict return precautions were discussed with the patient and the patient was instructed to return to the emergency department immediately if symptoms worsen   The patient/patient family member acknowledged and were in agreement with plan  Disposition  Final diagnoses:   Viral URI with cough   Cough   Bronchitis   URI (upper respiratory infection)   Shortness of breath   Nasal congestion     Time reflects when diagnosis was documented in both MDM as applicable and the Disposition within this note     Time User Action Codes Description Comment    9/7/2020  2:17 PM Kerry Dilan Add [J06 9] Viral URI with cough     9/7/2020  2:17 PM Kerry Dilan Add [R05] Cough     9/7/2020  2:17 PM Donia Guero Bronchitis     9/7/2020  2:17 PM Kerry Dilan Add [J06 9] URI (upper respiratory infection)     9/7/2020  2:17 PM Kerry Dilan Add [R06 02] Shortness of breath     9/7/2020  2:17 PM Kerry Dilan Add [R09 81] Nasal congestion       ED Disposition     ED Disposition Condition Date/Time Comment    Discharge Stable Mon Sep 7, 2020  2:18 PM Amna Larios discharge to home/self care              Follow-up Information     Follow up With Specialties Details Why [address], DO Emergency Medicine, Internal Medicine Schedule an appointment as soon as possible for a visit in 2 days For follow up regarding your symptoms and recheck [address] [facility]  [phone]            Discharge Medication List as of 9/7/2020  2:21 PM      CONTINUE these medications which have NOT CHANGED    Details   ALPRAZolam (XANAX) 1 mg tablet Take 1 mg by mouth daily at bedtime, Historical Med      aspirin (ECOTRIN) 325 mg EC tablet Take 325 mg by mouth, Historical Med      atorvastatin (LIPITOR) 10 mg tablet Take 1 tablet (10 mg total) by mouth daily, Starting Wed 7/1/2020, Normal      !! busPIRone (BUSPAR) 5 mg tablet take 2 tablets in the morning and 1 tablet at night, Historical Med      !! busPIRone (BUSPAR) 5 mg tablet take 2 tablets by oral route in the morning and 1 tablet in the evening , Historical Med      FLUoxetine (PROzac) 20 mg capsule Take 40 mg by mouth daily In the morning, Historical Med      metoprolol succinate (TOPROL-XL) 50 mg 24 hr tablet Take 1 tablet (50 mg total) by mouth daily, Starting Wed 7/1/2020, Normal      pregabalin (LYRICA) 75 mg capsule Every 12 hours, Historical Med      senna-docusate sodium (SENOKOT S) 8 6-50 mg per tablet Take 1 tablet by mouth 2 (two) times a day as needed, Starting Tue 9/24/2019, Until Wed 9/23/2020, Historical Med      UNKNOWN TO PATIENT Historical Med      zolpidem (AMBIEN) 5 mg tablet Take 5 mg by mouth daily at bedtime as needed for sleep , Until Discontinued, Historical Med       !! - Potential duplicate medications found  Please discuss with provider  No discharge procedures on file      PDMP Review     None          ED Provider  Electronically Signed by           Tammy Kovacs MD  09/07/20 9214

## 2020-09-08 ENCOUNTER — TELEPHONE (OUTPATIENT)
Dept: OTHER | Facility: OTHER | Age: 60
End: 2020-09-08

## 2020-09-08 LAB
ATRIAL RATE: 66 BPM
P AXIS: 64 DEGREES
PR INTERVAL: 152 MS
QRS AXIS: 61 DEGREES
QRSD INTERVAL: 86 MS
QT INTERVAL: 412 MS
QTC INTERVAL: 431 MS
SARS-COV-2 RNA SPEC QL NAA+PROBE: NOT DETECTED
T WAVE AXIS: 73 DEGREES
VENTRICULAR RATE: 66 BPM

## 2020-09-08 PROCEDURE — 93010 ELECTROCARDIOGRAM REPORT: CPT | Performed by: INTERNAL MEDICINE

## 2020-09-08 NOTE — TELEPHONE ENCOUNTER
The patient was called for notification of a test result for COVID-19  The patient did not answer the phone and a voicemail was left requesting a call back to [phone], Option 7

## 2020-09-08 NOTE — TELEPHONE ENCOUNTER
The patient was called for notification of a test result for COVID-19  The patient did not answer the home or cell phone and a voicemail was left on both numbers requesting a call back to [phone], Option 7

## 2020-09-09 NOTE — TELEPHONE ENCOUNTER
The patient was called for notification of a test result for COVID-19  The patient did not answer the phone and a voicemail was left requesting a call back to [phone], Option 7  Called the home number and left a message with his daughter, Jumana Berry), for patient to call back

## 2020-09-10 VITALS
WEIGHT: 310.85 LBS | BODY MASS INDEX: 47.26 KG/M2 | SYSTOLIC BLOOD PRESSURE: 183 MMHG | OXYGEN SATURATION: 96 % | TEMPERATURE: 98.6 F | HEART RATE: 63 BPM | RESPIRATION RATE: 21 BRPM | DIASTOLIC BLOOD PRESSURE: 81 MMHG

## 2020-09-11 NOTE — TELEPHONE ENCOUNTER
The patient was called for notification of a test result for COVID-19  The patient did not answer the phone and a voicemail was left requesting a call back to [phone], Option 7

## 2020-10-07 ENCOUNTER — OFFICE VISIT (OUTPATIENT)
Dept: CARDIOLOGY CLINIC | Facility: CLINIC | Age: 60
End: 2020-10-07
Payer: COMMERCIAL

## 2020-10-07 VITALS
BODY MASS INDEX: 47.13 KG/M2 | HEART RATE: 64 BPM | HEIGHT: 68 IN | SYSTOLIC BLOOD PRESSURE: 126 MMHG | DIASTOLIC BLOOD PRESSURE: 72 MMHG | WEIGHT: 311 LBS

## 2020-10-07 DIAGNOSIS — I25.10 CORONARY ARTERIOSCLEROSIS IN NATIVE ARTERY: Chronic | ICD-10-CM

## 2020-10-07 DIAGNOSIS — R91.1 PULMONARY NODULE: ICD-10-CM

## 2020-10-07 DIAGNOSIS — I48.0 PAROXYSMAL ATRIAL FIBRILLATION (HCC): Chronic | ICD-10-CM

## 2020-10-07 DIAGNOSIS — I10 ESSENTIAL (PRIMARY) HYPERTENSION: Chronic | ICD-10-CM

## 2020-10-07 DIAGNOSIS — E78.5 DYSLIPIDEMIA: ICD-10-CM

## 2020-10-07 DIAGNOSIS — I71.2 THORACIC AORTIC ANEURYSM WITHOUT RUPTURE (HCC): Primary | ICD-10-CM

## 2020-10-07 PROCEDURE — 99214 OFFICE O/P EST MOD 30 MIN: CPT | Performed by: INTERNAL MEDICINE

## 2020-10-09 ENCOUNTER — TRANSCRIBE ORDERS (OUTPATIENT)
Dept: LAB | Facility: MEDICAL CENTER | Age: 60
End: 2020-10-09

## 2020-10-09 ENCOUNTER — LAB (OUTPATIENT)
Dept: LAB | Facility: MEDICAL CENTER | Age: 60
End: 2020-10-09
Payer: COMMERCIAL

## 2020-10-09 DIAGNOSIS — N40.0 BENIGN PROSTATIC HYPERPLASIA, UNSPECIFIED WHETHER LOWER URINARY TRACT SYMPTOMS PRESENT: Primary | ICD-10-CM

## 2020-10-09 DIAGNOSIS — R73.9 ELEVATED BLOOD SUGAR: ICD-10-CM

## 2020-10-09 DIAGNOSIS — R35.1 NOCTURIA: ICD-10-CM

## 2020-10-09 DIAGNOSIS — I10 HYPERTENSION, UNSPECIFIED TYPE: ICD-10-CM

## 2020-10-09 DIAGNOSIS — R53.83 FATIGUE, UNSPECIFIED TYPE: ICD-10-CM

## 2020-10-09 DIAGNOSIS — E78.5 DYSLIPIDEMIA: ICD-10-CM

## 2020-10-09 DIAGNOSIS — I48.91 ATRIAL FIBRILLATION, UNSPECIFIED TYPE (HCC): ICD-10-CM

## 2020-10-09 DIAGNOSIS — N40.0 BENIGN PROSTATIC HYPERPLASIA, UNSPECIFIED WHETHER LOWER URINARY TRACT SYMPTOMS PRESENT: ICD-10-CM

## 2020-10-09 LAB
ALBUMIN SERPL BCP-MCNC: 3.7 G/DL (ref 3.5–5)
ALP SERPL-CCNC: 55 U/L (ref 46–116)
ALT SERPL W P-5'-P-CCNC: 30 U/L (ref 12–78)
ANION GAP SERPL CALCULATED.3IONS-SCNC: 3 MMOL/L (ref 4–13)
AST SERPL W P-5'-P-CCNC: 15 U/L (ref 5–45)
BASOPHILS # BLD AUTO: 0.04 THOUSANDS/ΜL (ref 0–0.1)
BASOPHILS NFR BLD AUTO: 1 % (ref 0–1)
BILIRUB SERPL-MCNC: 0.89 MG/DL (ref 0.2–1)
BUN SERPL-MCNC: 16 MG/DL (ref 5–25)
CALCIUM SERPL-MCNC: 9.1 MG/DL (ref 8.3–10.1)
CHLORIDE SERPL-SCNC: 108 MMOL/L (ref 100–108)
CHOLEST SERPL-MCNC: 136 MG/DL (ref 50–200)
CO2 SERPL-SCNC: 29 MMOL/L (ref 21–32)
CREAT SERPL-MCNC: 0.82 MG/DL (ref 0.6–1.3)
EOSINOPHIL # BLD AUTO: 0.29 THOUSAND/ΜL (ref 0–0.61)
EOSINOPHIL NFR BLD AUTO: 6 % (ref 0–6)
ERYTHROCYTE [DISTWIDTH] IN BLOOD BY AUTOMATED COUNT: 14.2 % (ref 11.6–15.1)
EST. AVERAGE GLUCOSE BLD GHB EST-MCNC: 103 MG/DL
GFR SERPL CREATININE-BSD FRML MDRD: 96 ML/MIN/1.73SQ M
GLUCOSE P FAST SERPL-MCNC: 93 MG/DL (ref 65–99)
HBA1C MFR BLD: 5.2 %
HCT VFR BLD AUTO: 45.3 % (ref 36.5–49.3)
HDLC SERPL-MCNC: 37 MG/DL
HGB BLD-MCNC: 15.3 G/DL (ref 12–17)
IMM GRANULOCYTES # BLD AUTO: 0.02 THOUSAND/UL (ref 0–0.2)
IMM GRANULOCYTES NFR BLD AUTO: 0 % (ref 0–2)
LDLC SERPL CALC-MCNC: 89 MG/DL (ref 0–100)
LDLC SERPL DIRECT ASSAY-MCNC: 94 MG/DL (ref 0–100)
LYMPHOCYTES # BLD AUTO: 1.06 THOUSANDS/ΜL (ref 0.6–4.47)
LYMPHOCYTES NFR BLD AUTO: 21 % (ref 14–44)
MCH RBC QN AUTO: 29.8 PG (ref 26.8–34.3)
MCHC RBC AUTO-ENTMCNC: 33.8 G/DL (ref 31.4–37.4)
MCV RBC AUTO: 88 FL (ref 82–98)
MONOCYTES # BLD AUTO: 0.53 THOUSAND/ΜL (ref 0.17–1.22)
MONOCYTES NFR BLD AUTO: 11 % (ref 4–12)
NEUTROPHILS # BLD AUTO: 3.1 THOUSANDS/ΜL (ref 1.85–7.62)
NEUTS SEG NFR BLD AUTO: 61 % (ref 43–75)
NONHDLC SERPL-MCNC: 99 MG/DL
NRBC BLD AUTO-RTO: 0 /100 WBCS
PLATELET # BLD AUTO: 151 THOUSANDS/UL (ref 149–390)
PMV BLD AUTO: 10.4 FL (ref 8.9–12.7)
POTASSIUM SERPL-SCNC: 4.3 MMOL/L (ref 3.5–5.3)
PROT SERPL-MCNC: 7.5 G/DL (ref 6.4–8.2)
PSA SERPL-MCNC: 0.4 NG/ML (ref 0–4)
RBC # BLD AUTO: 5.13 MILLION/UL (ref 3.88–5.62)
SODIUM SERPL-SCNC: 140 MMOL/L (ref 136–145)
T4 FREE SERPL-MCNC: 0.88 NG/DL (ref 0.76–1.46)
TRIGL SERPL-MCNC: 51 MG/DL
TSH SERPL DL<=0.05 MIU/L-ACNC: 2.12 UIU/ML (ref 0.36–3.74)
WBC # BLD AUTO: 5.04 THOUSAND/UL (ref 4.31–10.16)

## 2020-10-09 PROCEDURE — 80061 LIPID PANEL: CPT

## 2020-10-09 PROCEDURE — 80053 COMPREHEN METABOLIC PANEL: CPT

## 2020-10-09 PROCEDURE — G0103 PSA SCREENING: HCPCS

## 2020-10-09 PROCEDURE — 85025 COMPLETE CBC W/AUTO DIFF WBC: CPT

## 2020-10-09 PROCEDURE — 83721 ASSAY OF BLOOD LIPOPROTEIN: CPT

## 2020-10-09 PROCEDURE — 83036 HEMOGLOBIN GLYCOSYLATED A1C: CPT

## 2020-10-09 PROCEDURE — 84443 ASSAY THYROID STIM HORMONE: CPT

## 2020-10-09 PROCEDURE — 36415 COLL VENOUS BLD VENIPUNCTURE: CPT

## 2020-10-09 PROCEDURE — 84439 ASSAY OF FREE THYROXINE: CPT

## 2020-11-13 ENCOUNTER — HOSPITAL ENCOUNTER (OUTPATIENT)
Dept: CT IMAGING | Facility: HOSPITAL | Age: 60
Discharge: HOME | End: 2020-11-13
Attending: INTERNAL MEDICINE
Payer: COMMERCIAL

## 2020-11-13 DIAGNOSIS — R91.1 PULMONARY NODULE: ICD-10-CM

## 2020-11-13 DIAGNOSIS — I71.2 THORACIC AORTIC ANEURYSM WITHOUT RUPTURE (HCC): ICD-10-CM

## 2020-11-13 PROCEDURE — G1004 CDSM NDSC: HCPCS

## 2020-11-13 PROCEDURE — 71275 CT ANGIOGRAPHY CHEST: CPT

## 2020-11-13 RX ADMIN — IOHEXOL 100 ML: 350 INJECTION, SOLUTION INTRAVENOUS at 11:07

## 2020-12-23 DIAGNOSIS — Z20.828 EXPOSURE TO SARS-ASSOCIATED CORONAVIRUS: ICD-10-CM

## 2020-12-23 PROCEDURE — U0003 INFECTIOUS AGENT DETECTION BY NUCLEIC ACID (DNA OR RNA); SEVERE ACUTE RESPIRATORY SYNDROME CORONAVIRUS 2 (SARS-COV-2) (CORONAVIRUS DISEASE [COVID-19]), AMPLIFIED PROBE TECHNIQUE, MAKING USE OF HIGH THROUGHPUT TECHNOLOGIES AS DESCRIBED BY CMS-2020-01-R: HCPCS | Performed by: NURSE PRACTITIONER

## 2020-12-24 LAB — SARS-COV-2 RNA SPEC QL NAA+PROBE: NOT DETECTED

## 2021-01-12 DIAGNOSIS — E78.5 DYSLIPIDEMIA: ICD-10-CM

## 2021-01-12 DIAGNOSIS — I48.0 PAROXYSMAL ATRIAL FIBRILLATION (HCC): ICD-10-CM

## 2021-01-12 RX ORDER — METOPROLOL SUCCINATE 50 MG/1
TABLET, EXTENDED RELEASE ORAL
Qty: 30 TABLET | Refills: 5 | Status: SHIPPED | OUTPATIENT
Start: 2021-01-12 | End: 2021-07-14

## 2021-01-12 RX ORDER — ATORVASTATIN CALCIUM 10 MG/1
10 TABLET, FILM COATED ORAL DAILY
Qty: 30 TABLET | Refills: 5 | Status: SHIPPED | OUTPATIENT
Start: 2021-01-12 | End: 2022-02-12

## 2021-03-10 DIAGNOSIS — Z23 ENCOUNTER FOR IMMUNIZATION: ICD-10-CM

## 2021-05-07 ENCOUNTER — OFFICE VISIT (OUTPATIENT)
Dept: CARDIOLOGY CLINIC | Facility: CLINIC | Age: 61
End: 2021-05-07
Payer: COMMERCIAL

## 2021-05-07 VITALS
SYSTOLIC BLOOD PRESSURE: 110 MMHG | HEART RATE: 70 BPM | BODY MASS INDEX: 47.74 KG/M2 | WEIGHT: 315 LBS | HEIGHT: 68 IN | DIASTOLIC BLOOD PRESSURE: 60 MMHG

## 2021-05-07 DIAGNOSIS — E78.5 DYSLIPIDEMIA: ICD-10-CM

## 2021-05-07 DIAGNOSIS — I10 ESSENTIAL (PRIMARY) HYPERTENSION: ICD-10-CM

## 2021-05-07 DIAGNOSIS — I71.2 THORACIC AORTIC ANEURYSM WITHOUT RUPTURE (HCC): Primary | ICD-10-CM

## 2021-05-07 DIAGNOSIS — R60.9 EDEMA, UNSPECIFIED TYPE: ICD-10-CM

## 2021-05-07 DIAGNOSIS — R91.1 PULMONARY NODULE: ICD-10-CM

## 2021-05-07 PROCEDURE — 99214 OFFICE O/P EST MOD 30 MIN: CPT | Performed by: INTERNAL MEDICINE

## 2021-05-07 RX ORDER — FUROSEMIDE 20 MG/1
TABLET ORAL
Qty: 15 TABLET | Refills: 5 | Status: SHIPPED | OUTPATIENT
Start: 2021-05-07

## 2021-05-07 NOTE — PROGRESS NOTES
Subjective:        Patient ID: Gertrude Wills is a 61 y o  male  Chief Complaint:  J is here for routine follow-up  He has a small thoracic aortic aneurysm, pulmonary nodule, hypertension and dyslipidemia  He is a little bit more short of breath than usual has noticed little bit more swelling  No classic orthopnea or PND  No alarming shortness of breath importantly,denies any chest pain no palpitations of alarm, just a flutter for secondary to here and there nothing sustained, no presyncope or syncope  The following portions of the patient's history were reviewed and updated as appropriate: allergies, current medications, past family history, past medical history, past social history, past surgical history and problem list   Review of Systems   Constitution: Negative for chills, diaphoresis, malaise/fatigue and weight gain  HENT: Negative for nosebleeds and stridor  Eyes: Negative for double vision, vision loss in left eye, vision loss in right eye and visual disturbance  Cardiovascular: Positive for dyspnea on exertion and leg swelling  Negative for chest pain, claudication, cyanosis, irregular heartbeat, near-syncope, orthopnea, palpitations, paroxysmal nocturnal dyspnea and syncope  Respiratory: Negative for cough, shortness of breath, snoring and wheezing  Endocrine: Negative for polydipsia, polyphagia and polyuria  Hematologic/Lymphatic: Negative for bleeding problem  Does not bruise/bleed easily  Skin: Negative for flushing and rash  Musculoskeletal: Negative for falls and myalgias  Gastrointestinal: Negative for abdominal pain, heartburn, hematemesis, hematochezia, melena and nausea  Genitourinary: Negative for hematuria  Neurological: Negative for brief paralysis, dizziness, focal weakness, headaches, light-headedness, loss of balance and vertigo  Psychiatric/Behavioral: Negative for altered mental status and substance abuse  Allergic/Immunologic: Negative for hives  Objective:      /60   Pulse 70   Ht 5' 8" (1 727 m)   Wt (!) 149 kg (328 lb)   BMI 49 87 kg/m²   Physical Exam   Constitutional: He is oriented to person, place, and time  He appears well-developed and well-nourished  No distress  HENT:   Head: Normocephalic and atraumatic  Eyes: Pupils are equal, round, and reactive to light  EOM are normal  No scleral icterus  Neck: Normal range of motion  Neck supple  No JVD present  No thyromegaly present  Cardiovascular: Normal rate, regular rhythm and normal heart sounds  Exam reveals no gallop and no friction rub  No murmur heard  Pulmonary/Chest: Effort normal and breath sounds normal  No stridor  No respiratory distress  He has no wheezes  He has no rales  Abdominal: Soft  Bowel sounds are normal  He exhibits no distension and no mass  There is no abdominal tenderness  Musculoskeletal: Normal range of motion  General: Edema ( +1 bilateral pitting edema below knees) present  No deformity  Neurological: He is alert and oriented to person, place, and time  Coordination normal    Skin: Skin is warm and dry  No erythema  No pallor  Psychiatric: He has a normal mood and affect  His behavior is normal        Lab Review:   No visits with results within 2 Month(s) from this visit  Latest known visit with results is:   Orders Only on 12/23/2020   Component Date Value    SARS-CoV-2  12/23/2020 Not Detected      No results found  Assessment:       1  Thoracic aortic aneurysm without rupture ([facility] [address] )  CTA chest wo w contrast   2  Pulmonary nodule  CTA chest wo w contrast   3  Edema, unspecified type  furosemide (LASIX) 20 mg tablet   4  Essential (primary) hypertension  Comprehensive metabolic panel    CBC and differential    Lipid panel   5   Dyslipidemia  Comprehensive metabolic panel    CBC and differential    Lipid panel        Plan:       CT of chest with without contrast ordered for November for TAA and pulmonary nodule follow-up  For a little volume excess I ordered Lasix 20 mg twice a week, he will eat a banana on the days he takes this  His blood pressure is well controlled, no changes here recommended  His lipids are well controlled  I order follow-up blood work prior to his next visit as well with me in 6 months time  Should his edema progress or should he develop any other concerning potential cardiac symptoms in the meantime such as chest pains alarming palpitations or significant lightheadedness I asked him to call me sooner than his 6 month scheduled follow-up visit  I also did recommend he try and lose a little bit of weight before his next visit  This will help with his fluid overload

## 2021-07-12 DIAGNOSIS — I48.0 PAROXYSMAL ATRIAL FIBRILLATION (HCC): ICD-10-CM

## 2021-07-14 RX ORDER — METOPROLOL SUCCINATE 50 MG/1
TABLET, EXTENDED RELEASE ORAL
Qty: 30 TABLET | Refills: 0 | Status: SHIPPED | OUTPATIENT
Start: 2021-07-14 | End: 2022-02-12

## 2021-10-05 ENCOUNTER — APPOINTMENT (OUTPATIENT)
Dept: LAB | Facility: CLINIC | Age: 61
End: 2021-10-05
Payer: COMMERCIAL

## 2021-10-05 DIAGNOSIS — I10 ESSENTIAL (PRIMARY) HYPERTENSION: ICD-10-CM

## 2021-10-05 DIAGNOSIS — E78.5 DYSLIPIDEMIA: ICD-10-CM

## 2021-10-05 LAB
ALBUMIN SERPL BCP-MCNC: 3.4 G/DL (ref 3.5–5)
ALP SERPL-CCNC: 64 U/L (ref 46–116)
ALT SERPL W P-5'-P-CCNC: 36 U/L (ref 12–78)
ANION GAP SERPL CALCULATED.3IONS-SCNC: 2 MMOL/L (ref 4–13)
AST SERPL W P-5'-P-CCNC: 20 U/L (ref 5–45)
BASOPHILS # BLD AUTO: 0.05 THOUSANDS/ΜL (ref 0–0.1)
BASOPHILS NFR BLD AUTO: 1 % (ref 0–1)
BILIRUB SERPL-MCNC: 0.48 MG/DL (ref 0.2–1)
BUN SERPL-MCNC: 12 MG/DL (ref 5–25)
CALCIUM ALBUM COR SERPL-MCNC: 9.7 MG/DL (ref 8.3–10.1)
CALCIUM SERPL-MCNC: 9.2 MG/DL (ref 8.3–10.1)
CHLORIDE SERPL-SCNC: 107 MMOL/L (ref 100–108)
CHOLEST SERPL-MCNC: 135 MG/DL (ref 50–200)
CO2 SERPL-SCNC: 28 MMOL/L (ref 21–32)
CREAT SERPL-MCNC: 0.51 MG/DL (ref 0.6–1.3)
EOSINOPHIL # BLD AUTO: 0.27 THOUSAND/ΜL (ref 0–0.61)
EOSINOPHIL NFR BLD AUTO: 5 % (ref 0–6)
ERYTHROCYTE [DISTWIDTH] IN BLOOD BY AUTOMATED COUNT: 14.6 % (ref 11.6–15.1)
GFR SERPL CREATININE-BSD FRML MDRD: 117 ML/MIN/1.73SQ M
GLUCOSE P FAST SERPL-MCNC: 95 MG/DL (ref 65–99)
HCT VFR BLD AUTO: 45.8 % (ref 36.5–49.3)
HDLC SERPL-MCNC: 44 MG/DL
HGB BLD-MCNC: 15.1 G/DL (ref 12–17)
IMM GRANULOCYTES # BLD AUTO: 0.03 THOUSAND/UL (ref 0–0.2)
IMM GRANULOCYTES NFR BLD AUTO: 1 % (ref 0–2)
LDLC SERPL CALC-MCNC: 81 MG/DL (ref 0–100)
LYMPHOCYTES # BLD AUTO: 1.21 THOUSANDS/ΜL (ref 0.6–4.47)
LYMPHOCYTES NFR BLD AUTO: 21 % (ref 14–44)
MCH RBC QN AUTO: 28.9 PG (ref 26.8–34.3)
MCHC RBC AUTO-ENTMCNC: 33 G/DL (ref 31.4–37.4)
MCV RBC AUTO: 88 FL (ref 82–98)
MONOCYTES # BLD AUTO: 0.55 THOUSAND/ΜL (ref 0.17–1.22)
MONOCYTES NFR BLD AUTO: 9 % (ref 4–12)
NEUTROPHILS # BLD AUTO: 3.8 THOUSANDS/ΜL (ref 1.85–7.62)
NEUTS SEG NFR BLD AUTO: 63 % (ref 43–75)
NONHDLC SERPL-MCNC: 91 MG/DL
NRBC BLD AUTO-RTO: 0 /100 WBCS
PLATELET # BLD AUTO: 169 THOUSANDS/UL (ref 149–390)
PMV BLD AUTO: 10 FL (ref 8.9–12.7)
POTASSIUM SERPL-SCNC: 4.3 MMOL/L (ref 3.5–5.3)
PROT SERPL-MCNC: 7.2 G/DL (ref 6.4–8.2)
RBC # BLD AUTO: 5.22 MILLION/UL (ref 3.88–5.62)
SODIUM SERPL-SCNC: 137 MMOL/L (ref 136–145)
TRIGL SERPL-MCNC: 48 MG/DL
WBC # BLD AUTO: 5.91 THOUSAND/UL (ref 4.31–10.16)

## 2021-10-05 PROCEDURE — 36415 COLL VENOUS BLD VENIPUNCTURE: CPT

## 2021-10-05 PROCEDURE — 80053 COMPREHEN METABOLIC PANEL: CPT

## 2021-10-05 PROCEDURE — 85025 COMPLETE CBC W/AUTO DIFF WBC: CPT

## 2021-10-05 PROCEDURE — 80061 LIPID PANEL: CPT

## 2021-10-26 ENCOUNTER — HOSPITAL ENCOUNTER (OUTPATIENT)
Dept: NON INVASIVE DIAGNOSTICS | Facility: HOSPITAL | Age: 61
Discharge: HOME | End: 2021-10-26
Payer: COMMERCIAL

## 2021-10-26 DIAGNOSIS — M79.604 RIGHT LEG PAIN: ICD-10-CM

## 2021-10-26 DIAGNOSIS — R22.41 LOCALIZED SWELLING, MASS AND LUMP, RIGHT LOWER LIMB: ICD-10-CM

## 2021-10-26 PROCEDURE — 93971 EXTREMITY STUDY: CPT | Performed by: SURGERY

## 2021-10-26 PROCEDURE — 93971 EXTREMITY STUDY: CPT

## 2021-11-01 ENCOUNTER — APPOINTMENT (OUTPATIENT)
Dept: LAB | Facility: MEDICAL CENTER | Age: 61
End: 2021-11-01
Payer: COMMERCIAL

## 2021-11-01 DIAGNOSIS — L03.90 CELLULITIS, UNSPECIFIED CELLULITIS SITE: ICD-10-CM

## 2021-11-01 DIAGNOSIS — R60.9 EDEMA, UNSPECIFIED TYPE: ICD-10-CM

## 2021-11-01 DIAGNOSIS — I10 HYPERTENSION, UNSPECIFIED TYPE: ICD-10-CM

## 2021-11-01 DIAGNOSIS — Z79.4 OTHER SPECIFIED DIABETES MELLITUS WITH OTHER SPECIFIED COMPLICATION, WITH LONG-TERM CURRENT USE OF INSULIN (HCC): ICD-10-CM

## 2021-11-01 DIAGNOSIS — E13.69 OTHER SPECIFIED DIABETES MELLITUS WITH OTHER SPECIFIED COMPLICATION, WITH LONG-TERM CURRENT USE OF INSULIN (HCC): ICD-10-CM

## 2021-11-01 LAB
ANION GAP SERPL CALCULATED.3IONS-SCNC: 4 MMOL/L (ref 4–13)
BUN SERPL-MCNC: 14 MG/DL (ref 5–25)
CALCIUM SERPL-MCNC: 8.9 MG/DL (ref 8.3–10.1)
CHLORIDE SERPL-SCNC: 106 MMOL/L (ref 100–108)
CO2 SERPL-SCNC: 27 MMOL/L (ref 21–32)
CREAT SERPL-MCNC: 0.84 MG/DL (ref 0.6–1.3)
CRP SERPL QL: 5.5 MG/L
ERYTHROCYTE [DISTWIDTH] IN BLOOD BY AUTOMATED COUNT: 14.4 % (ref 11.6–15.1)
GFR SERPL CREATININE-BSD FRML MDRD: 95 ML/MIN/1.73SQ M
GLUCOSE SERPL-MCNC: 96 MG/DL (ref 65–140)
HCT VFR BLD AUTO: 47 % (ref 36.5–49.3)
HGB BLD-MCNC: 15.5 G/DL (ref 12–17)
MCH RBC QN AUTO: 28.8 PG (ref 26.8–34.3)
MCHC RBC AUTO-ENTMCNC: 33 G/DL (ref 31.4–37.4)
MCV RBC AUTO: 87 FL (ref 82–98)
PLATELET # BLD AUTO: 193 THOUSANDS/UL (ref 149–390)
PMV BLD AUTO: 10 FL (ref 8.9–12.7)
POTASSIUM SERPL-SCNC: 4.4 MMOL/L (ref 3.5–5.3)
RBC # BLD AUTO: 5.38 MILLION/UL (ref 3.88–5.62)
SODIUM SERPL-SCNC: 137 MMOL/L (ref 136–145)
WBC # BLD AUTO: 7.1 THOUSAND/UL (ref 4.31–10.16)

## 2021-11-01 PROCEDURE — 36415 COLL VENOUS BLD VENIPUNCTURE: CPT

## 2021-11-01 PROCEDURE — 86140 C-REACTIVE PROTEIN: CPT

## 2021-11-01 PROCEDURE — 85027 COMPLETE CBC AUTOMATED: CPT

## 2021-11-01 PROCEDURE — 80048 BASIC METABOLIC PNL TOTAL CA: CPT

## 2021-11-07 ENCOUNTER — HOSPITAL ENCOUNTER (OUTPATIENT)
Dept: CT IMAGING | Facility: HOSPITAL | Age: 61
Discharge: HOME | End: 2021-11-07
Attending: INTERNAL MEDICINE
Payer: COMMERCIAL

## 2021-11-07 DIAGNOSIS — R91.1 PULMONARY NODULE: ICD-10-CM

## 2021-11-07 DIAGNOSIS — I71.2 THORACIC AORTIC ANEURYSM WITHOUT RUPTURE (HCC): ICD-10-CM

## 2021-11-07 PROCEDURE — 71275 CT ANGIOGRAPHY CHEST: CPT

## 2021-11-07 PROCEDURE — G1004 CDSM NDSC: HCPCS

## 2021-11-07 RX ADMIN — IOHEXOL 100 ML: 350 INJECTION, SOLUTION INTRAVENOUS at 14:11

## 2021-11-24 ENCOUNTER — OFFICE VISIT (OUTPATIENT)
Dept: CARDIOLOGY CLINIC | Facility: CLINIC | Age: 61
End: 2021-11-24
Payer: COMMERCIAL

## 2021-11-24 VITALS
WEIGHT: 315 LBS | HEART RATE: 62 BPM | BODY MASS INDEX: 47.74 KG/M2 | HEIGHT: 68 IN | DIASTOLIC BLOOD PRESSURE: 60 MMHG | SYSTOLIC BLOOD PRESSURE: 110 MMHG

## 2021-11-24 DIAGNOSIS — I71.2 THORACIC AORTIC ANEURYSM WITHOUT RUPTURE (HCC): Primary | ICD-10-CM

## 2021-11-24 DIAGNOSIS — Z98.890 H/O CARDIAC RADIOFREQUENCY ABLATION: ICD-10-CM

## 2021-11-24 DIAGNOSIS — I25.10 CORONARY ARTERIOSCLEROSIS IN NATIVE ARTERY: Chronic | ICD-10-CM

## 2021-11-24 DIAGNOSIS — I48.0 PAROXYSMAL ATRIAL FIBRILLATION (HCC): ICD-10-CM

## 2021-11-24 DIAGNOSIS — E78.2 MIXED HYPERLIPIDEMIA: ICD-10-CM

## 2021-11-24 DIAGNOSIS — I10 ESSENTIAL (PRIMARY) HYPERTENSION: Chronic | ICD-10-CM

## 2021-11-24 PROCEDURE — 93000 ELECTROCARDIOGRAM COMPLETE: CPT | Performed by: INTERNAL MEDICINE

## 2021-11-24 PROCEDURE — 99214 OFFICE O/P EST MOD 30 MIN: CPT | Performed by: INTERNAL MEDICINE

## 2021-11-24 RX ORDER — TRAMADOL HYDROCHLORIDE 50 MG/1
50 TABLET ORAL EVERY 6 HOURS PRN
COMMUNITY
Start: 2021-11-01

## 2022-02-12 DIAGNOSIS — I48.0 PAROXYSMAL ATRIAL FIBRILLATION (HCC): ICD-10-CM

## 2022-02-12 DIAGNOSIS — E78.5 DYSLIPIDEMIA: ICD-10-CM

## 2022-02-12 RX ORDER — METOPROLOL SUCCINATE 50 MG/1
TABLET, EXTENDED RELEASE ORAL
Qty: 30 TABLET | Refills: 0 | Status: SHIPPED | OUTPATIENT
Start: 2022-02-12 | End: 2022-02-15

## 2022-02-12 RX ORDER — ATORVASTATIN CALCIUM 10 MG/1
TABLET, FILM COATED ORAL
Qty: 30 TABLET | Refills: 0 | Status: SHIPPED | OUTPATIENT
Start: 2022-02-12 | End: 2022-02-15

## 2022-02-15 RX ORDER — METOPROLOL SUCCINATE 50 MG/1
50 TABLET, EXTENDED RELEASE ORAL DAILY
Qty: 30 TABLET | Refills: 5 | Status: SHIPPED | OUTPATIENT
Start: 2022-02-15

## 2022-02-15 RX ORDER — ATORVASTATIN CALCIUM 10 MG/1
10 TABLET, FILM COATED ORAL DAILY
Qty: 30 TABLET | Refills: 5 | Status: SHIPPED | OUTPATIENT
Start: 2022-02-15

## 2022-04-30 ENCOUNTER — OFFICE VISIT (OUTPATIENT)
Dept: URGENT CARE | Facility: CLINIC | Age: 62
End: 2022-04-30
Payer: COMMERCIAL

## 2022-04-30 VITALS
DIASTOLIC BLOOD PRESSURE: 72 MMHG | TEMPERATURE: 98 F | OXYGEN SATURATION: 97 % | RESPIRATION RATE: 18 BRPM | SYSTOLIC BLOOD PRESSURE: 138 MMHG | HEART RATE: 54 BPM

## 2022-04-30 DIAGNOSIS — M79.605 PAIN OF LEFT LOWER EXTREMITY: Primary | ICD-10-CM

## 2022-04-30 PROCEDURE — 99213 OFFICE O/P EST LOW 20 MIN: CPT | Performed by: PHYSICIAN ASSISTANT

## 2022-04-30 NOTE — PROGRESS NOTES
[facility]    NAME: Mi Aranda is a 64 y o  male  : 1960    MRN: [id number]  DATE: 2022  TIME: 1:19 PM    Assessment and Plan   Pain of left lower extremity [M79 605]  1  Pain of left lower extremity  Transfer to other facility       Patient Instructions     Patient Instructions   Recommend patient go to the emergency room for further evaluation  Chief Complaint     Chief Complaint   Patient presents with    Leg Pain     yesterday: left calf/leg pain 8/10  Hx of DVT's & blood clots noted  Flew on thursday  No trauma noted  History of Present Illness   60-year-old male here with complaint of left lower leg pain  Also has some swelling  Has a history of chronic venous insufficiency and DVT  Has had blood clots in the past   Currently taking aspirin 325 mg daily  Symptoms started yesterday  The patient was on an airplane and had flown 2 days ago  Review of Systems   Review of Systems   Constitutional: Negative for chills, fatigue and fever  Respiratory: Negative for cough, shortness of breath and wheezing  Cardiovascular: Positive for leg swelling (Left leg pain)  Gastrointestinal: Negative for abdominal pain, diarrhea, nausea and vomiting  Genitourinary: Negative for dysuria, flank pain, frequency, hematuria, scrotal swelling, testicular pain and urgency  Neurological: Negative for headaches  All other systems reviewed and are negative        Current Medications     Current Outpatient Medications:     aspirin (ECOTRIN) 325 mg EC tablet, Take 325 mg by mouth, Disp: , Rfl:     atorvastatin (LIPITOR) 10 mg tablet, Take 1 tablet (10 mg total) by mouth daily, Disp: 30 tablet, Rfl: 5    busPIRone (BUSPAR) 5 mg tablet, take 2 tablets in the morning and 1 tablet at night, Disp: , Rfl:     busPIRone (BUSPAR) 5 mg tablet, take 2 tablets by oral route in the morning and 1 tablet in the evening , Disp: , Rfl:     FLUoxetine (PROzac) 20 mg capsule, Take 40 mg by mouth daily In the morning, Disp: , Rfl:     metoprolol succinate (TOPROL-XL) 50 mg 24 hr tablet, Take 1 tablet (50 mg total) by mouth daily, Disp: 30 tablet, Rfl: 5    pregabalin (LYRICA) 75 mg capsule, Take 75 mg by mouth 2 (two) times a day  , Disp: , Rfl:     ALPRAZolam (XANAX) 1 mg tablet, Take 1 mg by mouth daily at bedtime (Patient not taking: Reported on 4/30/2022 ), Disp: , Rfl:     furosemide (LASIX) 20 mg tablet, Take one two days per week for edema (Patient not taking: Reported on 4/30/2022 ), Disp: 15 tablet, Rfl: 5    senna-docusate sodium (SENOKOT S) 8 6-50 mg per tablet, Take 1 tablet by mouth 2 (two) times a day as needed, Disp: , Rfl:     traMADol (ULTRAM) 50 mg tablet, Take 50 mg by mouth every 6 (six) hours as needed   (Patient not taking: Reported on 4/30/2022 ), Disp: , Rfl:     UNKNOWN TO PATIENT, , Disp: , Rfl:     zolpidem (AMBIEN) 5 mg tablet, Take 5 mg by mouth daily at bedtime as needed for sleep  (Patient not taking: Reported on 4/30/2022 ), Disp: , Rfl:     Current Allergies     Allergies as of 04/30/2022 - Reviewed 04/30/2022   Allergen Reaction Noted    Benadryl [diphenhydramine] Other (See Comments) 05/03/2016    Prednisone Palpitations 10/29/2019          The following portions of the patient's history were reviewed and updated as appropriate: allergies, current medications, past family history, past medical history, past social history, past surgical history and problem list    Past Medical History:   Diagnosis Date    Anxiety     Arthritis     Athscl heart disease of native coronary artery w/o ang pctrs     Atrial fibrillation ([address] ) 2012 and 2016    CPAP (continuous positive airway pressure) dependence     Diverticulitis     Edema     Legs and hands    Essential tremor     Hard of hearing     Hx of cardiovascular stress test 04/28/2016    EF0 50 (50%) normal LVSF   No evidence for prior ischemia or MI    Hx of echocardiogram 05/18/2016    EF0 55 (55%) severely technically limited suboptimal study  No significant valvular insufficiency or stenosis   / EF0 60 (60%) Trace MR and TR 6/22/17  / EF0 65 (65%) technically difficult study  no hemodynamically significant valve disease identified  11/30/17      Hypercholesteremia     Hypertension     Mixed hyperlipidemia     Morbid obesity ([facility] [place] [address] )     Obesity     Panic attacks     Rash     On extremeties and chest  Chronic, recurrent    Sleep apnea     Varicose veins of left lower extremity     Bilateral     Past Surgical History:   Procedure Laterality Date    ABLATION RADIO FREQUENCY ATRIAL (MAZE/CRYOABLATION)  02/08/2018    Cryoablation lesion summary, 10 applications were delivered      CARDIAC CATHETERIZATION  11/2012    No sig CAD  6/26/17    COLONOSCOPY      JOINT REPLACEMENT Bilateral     knees    NC TOTAL KNEE ARTHROPLASTY Bilateral 6/1/2016    Procedure: ARTHROPLASTY KNEE TOTAL BILATERAL;  Surgeon: Marrianne Bernheim, MD;  Location: [facility];  Service: Orthopedics    SPINE SURGERY  2019    ACDF C6-7     Family History   Problem Relation Age of Onset    Coronary artery disease Family         less than 61 yrs of age   [facility] Heart attack Mother         MI    Heart attack Sister         MI    Heart attack Brother         MI     Social History     Socioeconomic History    Marital status: /Civil Union     Spouse name: Not on file    Number of children: Not on file    Years of education: Not on file    Highest education level: Not on file   Occupational History    Not on file   Tobacco Use    Smoking status: Former Smoker     Packs/day: 1 00     Years: 18 00     Pack years: 18 00     Types: Cigarettes    Smokeless tobacco: Never Used    Tobacco comment: Quit 21 years ago   Vaping Use    Vaping Use: Never used   Substance and Sexual Activity    Alcohol use: Yes     Comment: socially    Drug use: No    Sexual activity: Not on file   Other Topics Concern    Not on file   Social History Narrative  Not on file     Social Determinants of Health     Financial Resource Strain: Not on file   Food Insecurity: Not on file   Transportation Needs: Not on file   Physical Activity: Not on file   Stress: Not on file   Social Connections: Not on file   Intimate Partner Violence: Not on file   Housing Stability: Not on file     Medications have been verified  Objective   /72   Pulse (!) 54   Temp 98 °F (36 7 °C)   Resp 18   SpO2 97%      Physical Exam   Physical Exam  Vitals and nursing note reviewed  Constitutional:       General: He is not in acute distress  Appearance: He is well-developed  HENT:      Head: Normocephalic and atraumatic  Right Ear: Tympanic membrane normal       Left Ear: Tympanic membrane normal       Nose: No mucosal edema  Cardiovascular:      Rate and Rhythm: Normal rate and regular rhythm  Heart sounds: Normal heart sounds  Pulmonary:      Effort: Pulmonary effort is normal  No respiratory distress  Breath sounds: Normal breath sounds  Musculoskeletal:      Comments: There is some mild calf swelling on the left side  There is chronic venous insufficiency changes bilateral lower extremities with varicosities  Patient does have tenderness of the left calf with positive Homans

## 2022-05-25 ENCOUNTER — OFFICE VISIT (OUTPATIENT)
Dept: CARDIOLOGY CLINIC | Facility: CLINIC | Age: 62
End: 2022-05-25
Payer: COMMERCIAL

## 2022-05-25 VITALS
SYSTOLIC BLOOD PRESSURE: 110 MMHG | HEART RATE: 60 BPM | DIASTOLIC BLOOD PRESSURE: 70 MMHG | BODY MASS INDEX: 47.74 KG/M2 | WEIGHT: 315 LBS | HEIGHT: 68 IN

## 2022-05-25 DIAGNOSIS — I71.2 THORACIC AORTIC ANEURYSM WITHOUT RUPTURE (HCC): ICD-10-CM

## 2022-05-25 DIAGNOSIS — E78.5 HYPERLIPIDEMIA, UNSPECIFIED HYPERLIPIDEMIA TYPE: ICD-10-CM

## 2022-05-25 DIAGNOSIS — I25.10 CORONARY ARTERIOSCLEROSIS IN NATIVE ARTERY: Primary | Chronic | ICD-10-CM

## 2022-05-25 DIAGNOSIS — Z98.890 H/O CARDIAC RADIOFREQUENCY ABLATION: ICD-10-CM

## 2022-05-25 DIAGNOSIS — I10 ESSENTIAL (PRIMARY) HYPERTENSION: Chronic | ICD-10-CM

## 2022-05-25 DIAGNOSIS — I48.0 PAROXYSMAL ATRIAL FIBRILLATION (HCC): Chronic | ICD-10-CM

## 2022-05-25 PROCEDURE — 99214 OFFICE O/P EST MOD 30 MIN: CPT | Performed by: INTERNAL MEDICINE

## 2022-05-25 RX ORDER — FAMOTIDINE 20 MG/1
20 TABLET, FILM COATED ORAL DAILY
COMMUNITY
Start: 2022-03-21

## 2022-05-25 NOTE — PATIENT INSTRUCTIONS
Cholesterol and Your Health   AMBULATORY CARE:   Cholesterol  is a waxy, fat-like substance  Your body uses cholesterol to make hormones and new cells, and to protect nerves  Cholesterol is made by your body  It also comes from certain foods you eat, such as meat and dairy products  Your healthcare provider can help you set goals for your cholesterol levels  He or she can help you create a plan to meet your goals  Cholesterol level goals: Your cholesterol level goals depend on your risk for heart disease, your age, and your other health conditions  The following are general guidelines: Total cholesterol  includes low-density lipoprotein (LDL), high-density lipoprotein (HDL), and triglyceride levels  The total cholesterol level should be lower than 200 mg/dL and is best at about 150 mg/dL  LDL cholesterol  is called bad cholesterol  because it forms plaque in your arteries  As plaque builds up, your arteries become narrow, and less blood flows through  When plaque decreases blood flow to your heart, you may have chest pain  If plaque completely blocks an artery that brings blood to your heart, you may have a heart attack  Plaque can break off and form blood clots  Blood clots may block arteries in your brain and cause a stroke  The level should be less than 130 mg/dL and is best at about 100 mg/dL  HDL cholesterol  is called good cholesterol  because it helps remove LDL cholesterol from your arteries  It does this by attaching to LDL cholesterol and carrying it to your liver  Your liver breaks down LDL cholesterol so your body can get rid of it  High levels of HDL cholesterol can help prevent a heart attack and stroke  Low levels of HDL cholesterol can increase your risk for heart disease, heart attack, and stroke  The level should be 60 mg/dL or higher  Triglycerides  are a type of fat that store energy from foods you eat  High levels of triglycerides also cause plaque buildup   This can increase your risk for a heart attack or stroke  If your triglyceride level is high, your LDL cholesterol level may also be high  The level should be less than 150 mg/dL  Any of the following can increase your risk for high cholesterol:   Smoking cigarettes    Being overweight or obese, or not getting enough exercise    Drinking large amounts of alcohol    A medical condition such as hypertension (high blood pressure) or diabetes    Certain genes passed from your parents to you    Age older than 65 years    What you need to know about having your cholesterol levels checked: Adults 21to 39years of age should have their cholesterol levels checked every 4 to 6 years  Adults 45 years or older should have their cholesterol checked every 1 to 2 years  You may need your cholesterol checked more often, or at a younger age, if you have risk factors for heart disease  You may also need to have your cholesterol checked more often if you have other health conditions, such as diabetes  Blood tests are used to check cholesterol levels  Blood tests measure your levels of triglycerides, LDL cholesterol, and HDL cholesterol  How healthy fats affect your cholesterol levels:  Healthy fats, also called unsaturated fats, help lower LDL cholesterol and triglyceride levels  Healthy fats include the following:  Monounsaturated fats  are found in foods such as olive oil, canola oil, avocado, nuts, and olives  Polyunsaturated fats,  such as omega 3 fats, are found in fish, such as salmon, trout, and tuna  They can also be found in plant foods such as flaxseed, walnuts, and soybeans  How unhealthy fats affect your cholesterol levels:  Unhealthy fats increase LDL cholesterol and triglyceride levels  They are found in foods high in cholesterol, saturated fat, and trans fat:  Cholesterol  is found in eggs, dairy, and meat  Saturated fat  is found in butter, cheese, ice cream, whole milk, and coconut oil   Saturated fat is also found in meat, such as sausage, hot dogs, and bologna  Trans fat  is found in liquid oils and is used in fried and baked foods  Foods that contain trans fats include chips, crackers, muffins, sweet rolls, microwave popcorn, and cookies  Treatment  for high cholesterol will also decrease your risk of heart disease, heart attack, and stroke  Treatment may include any of the following:  Lifestyle changes  may include food, exercise, weight loss, and quitting smoking  You may also need to decrease the amount of alcohol you drink  Your healthcare provider will want you to start with lifestyle changes  Other treatment may be added if lifestyle changes are not enough  Your healthcare provider may recommend you work with a team to manage hyperlipidemia  The team may include medical experts such as a dietitian, an exercise or physical therapist, and a behavior therapist  Your family members may be included in helping you create lifestyle changes  Medicines  may be given to lower your LDL cholesterol, triglyceride levels, or total cholesterol level  You may need medicines to lower your cholesterol if any of the following is true:    You have a history of stroke, TIA, unstable angina, or a heart attack  Your LDL cholesterol level is 190 mg/dL or higher  You are age 36 to 76 years, have diabetes or heart disease risk factors, and your LDL cholesterol is 70 mg/dL or higher  Supplements  include fish oil, red yeast rice, and garlic  Fish oil may help lower your triglyceride and LDL cholesterol levels  It may also increase your HDL cholesterol level  Red yeast rice may help decrease your total cholesterol level and LDL cholesterol level  Garlic may help lower your total cholesterol level  Do not take any supplements without talking to your healthcare provider  Food changes you can make to lower your cholesterol levels:  A dietitian can help you create a healthy eating plan   He or she can show you how to read food labels and choose foods low in saturated fat, trans fats, and cholesterol  Decrease the total amount of fat you eat  Choose lean meats, fat-free or 1% fat milk, and low-fat dairy products, such as yogurt and cheese  Try to limit or avoid red meats  Limit or do not eat fried foods or baked goods, such as cookies  Replace unhealthy fats with healthy fats  Cook foods in olive oil or canola oil  Choose soft margarines that are low in saturated fat and trans fat  Seeds, nuts, and avocados are other examples of healthy fats  Eat foods with omega-3 fats  Examples include salmon, tuna, mackerel, walnuts, and flaxseed  Eat fish 2 times per week  Pregnant women should not eat fish that have high levels of mercury, such as shark, swordfish, and jaspal mackerel  Increase the amount of high-fiber foods you eat  High-fiber foods can help lower your LDL cholesterol  Aim to get between 20 and 30 grams of fiber each day  Fruits and vegetables are high in fiber  Eat at least 5 servings each day  Other high-fiber foods are whole-grain or whole-wheat breads, pastas, or cereals, and brown rice  Eat 3 ounces of whole-grain foods each day  Increase fiber slowly  You may have abdominal discomfort, bloating, and gas if you add fiber to your diet too quickly  Eat healthy protein foods  Examples include low-fat dairy products, skinless chicken and turkey, fish, and nuts  Limit foods and drinks that are high in sugar  Your dietitian or healthcare provider can help you create daily limits for high-sugar foods and drinks  The limit may be lower if you have diabetes or another health condition  Limits can also help you lose weight if needed  Lifestyle changes you can make to lower your cholesterol levels:   Maintain a healthy weight  Ask your healthcare provider what a healthy weight is for you  Ask him or her to help you create a weight loss plan if needed   Weight loss can decrease your total cholesterol and triglyceride levels  Weight loss may also help keep your blood pressure at a healthy level  Be physically active throughout the day  Physical activity, such as exercise, can help lower your total cholesterol level and maintain a healthy weight  Physical activity can also help increase your HDL cholesterol level  Work with your healthcare provider to create an program that is right for you  Get at least 30 to 40 minutes of moderate physical activity most days of the week  Examples of exercise include brisk walking, swimming, or biking  Also include strength training at least 2 times each week  Your healthcare providers can help you create a physical activity plan  Do not smoke  Nicotine and other chemicals in cigarettes and cigars can raise your cholesterol levels  Ask your healthcare provider for information if you currently smoke and need help to quit  E-cigarettes or smokeless tobacco still contain nicotine  Talk to your healthcare provider before you use these products  Limit or do not drink alcohol  Alcohol can increase your triglyceride levels  Ask your healthcare provider before you drink alcohol  Ask how much is okay for you to drink in 24 hours or 1 week  Follow up with your doctor as directed:  Write down your questions so you remember to ask them during your visits  © Copyright [employer] 2022 Information is for End User's use only and may not be sold, redistributed or otherwise used for commercial purposes  All illustrations and images included in CareNotes® are the copyrighted property of [employer]  or [facility] Farhana Kyle  The above information is an  only  It is not intended as medical advice for individual conditions or treatments  Talk to your doctor, nurse or pharmacist before following any medical regimen to see if it is safe and effective for you

## 2022-05-25 NOTE — PROGRESS NOTES
Subjective:        Patient ID: Alysia Espinoza is a 64 y o  male  Chief Complaint:  Markel Jefferson is here for routine follow-up, with CT proven coronary atherosclerosis, small thoracic aortic aneurysm, hypertension and chronic atypical chest pain  He feels well, offers no concerning complaints, that is no exertionally mediated chest pains or tightness, no alarming dyspnea, no unusual edema orthopnea or PND, only using his diuretic very rarely, no palpitations presyncope or syncope  The following portions of the patient's history were reviewed and updated as appropriate: allergies, current medications, past family history, past medical history, past social history, past surgical history and problem list   Review of Systems   Constitutional: Negative for chills, diaphoresis, malaise/fatigue and weight gain  HENT: Negative for nosebleeds and stridor  Eyes: Negative for double vision, vision loss in left eye, vision loss in right eye and visual disturbance  Cardiovascular: Negative for chest pain, claudication, cyanosis, dyspnea on exertion, irregular heartbeat, leg swelling, near-syncope, orthopnea, palpitations, paroxysmal nocturnal dyspnea and syncope  Respiratory: Negative for cough, shortness of breath, snoring and wheezing  Endocrine: Negative for polydipsia, polyphagia and polyuria  Hematologic/Lymphatic: Negative for bleeding problem  Does not bruise/bleed easily  Skin: Negative for flushing and rash  Musculoskeletal: Negative for falls and myalgias  Gastrointestinal: Negative for abdominal pain, heartburn, hematemesis, hematochezia, melena and nausea  Genitourinary: Negative for hematuria  Neurological: Negative for brief paralysis, dizziness, focal weakness, headaches, light-headedness, loss of balance and vertigo  Psychiatric/Behavioral: Negative for altered mental status and substance abuse  Allergic/Immunologic: Negative for hives            Objective:      /70   Pulse 60  5' 8" (1 727 m)   Wt (!) 148 kg (327 lb)   BMI 49 72 kg/m²   Physical Exam  Constitutional:       General: He is not in acute distress  Appearance: He is well-developed  He is not diaphoretic  HENT:      Head: Normocephalic and atraumatic  Eyes:      General: No scleral icterus  Pupils: Pupils are equal, round, and reactive to light  Neck:      Thyroid: No thyromegaly  Vascular: No carotid bruit or JVD  Cardiovascular:      Rate and Rhythm: Normal rate and regular rhythm  Heart sounds: Normal heart sounds  No murmur heard  No friction rub  No gallop  Pulmonary:      Effort: Pulmonary effort is normal  No respiratory distress  Breath sounds: Normal breath sounds  No stridor  No wheezing or rales  Abdominal:      General: Bowel sounds are normal  There is no distension  Palpations: Abdomen is soft  There is no mass  Tenderness: There is no abdominal tenderness  Musculoskeletal:         General: No deformity  Normal range of motion  Cervical back: Normal range of motion and neck supple  Right lower leg: No edema  Left lower leg: No edema  Skin:     General: Skin is warm and dry  Coloration: Skin is not pale  Findings: No erythema  Neurological:      Mental Status: He is alert and oriented to person, place, and time  Coordination: Coordination normal    Psychiatric:         Mood and Affect: Mood normal          Behavior: Behavior normal          Thought Content: Thought content normal          Judgment: Judgment normal          Lab Review:   No visits with results within 2 Month(s) from this visit     Latest known visit with results is:   Appointment on 11/01/2021   Component Date Value    WBC 11/01/2021 7 10     RBC 11/01/2021 5 38     Hemoglobin 11/01/2021 15 5     Hematocrit 11/01/2021 47 0     MCV 11/01/2021 87     MCH 11/01/2021 28 8     MCHC 11/01/2021 33 0     RDW 11/01/2021 14 4     Platelets 25/02/8714 193     MPV 11/01/2021 10 0     CRP 11/01/2021 5 5 (A)    Sodium 11/01/2021 137     Potassium 11/01/2021 4 4     Chloride 11/01/2021 106     CO2 11/01/2021 27     ANION GAP 11/01/2021 4     BUN 11/01/2021 14     Creatinine 11/01/2021 0 84     Glucose 11/01/2021 96     Calcium 11/01/2021 8 9     eGFR 11/01/2021 95      No results found  Assessment:       1  Coronary arteriosclerosis in native artery     2  Essential (primary) hypertension     3  Paroxysmal atrial fibrillation (HCC)     4  H/O cardiac radiofrequency ablation     5  Hyperlipidemia, unspecified hyperlipidemia type     6  Thoracic aortic aneurysm without rupture [facility])          Plan:       Suzette Tsang has no signs or symptoms reminiscent of angina heart failure nor electrical instability  Nor does he have any symptoms reminiscent of an alarming thoracic aortic aneurysm  Due for follow-up on pulmonary nodules and his thoracic aorta this November, in light of the contrast shortage nationally will hold off on ordering that until after my next visit with him this fall  No evidence recurrent AFib post ablation  Blood pressure well controlled, continue metoprolol  Lipids well controlled on current dose atorvastatin  Continue same  Advised he continue with aspirin therapy in light of radiologically proven coronary atherosclerosis  Continue p r n  Diuretic use  I ordered no acute cardiac testing, changed no medications, plan to see him back in November, asked to call me sooner with any concerning potential cardiac symptoms in the meantime

## 2022-08-31 DIAGNOSIS — E78.5 DYSLIPIDEMIA: ICD-10-CM

## 2022-08-31 RX ORDER — ATORVASTATIN CALCIUM 10 MG/1
10 TABLET, FILM COATED ORAL DAILY
Qty: 30 TABLET | Refills: 5 | Status: SHIPPED | OUTPATIENT
Start: 2022-08-31

## 2022-08-31 NOTE — TELEPHONE ENCOUNTER
Saadia YANES  [facility]  Metoprolol succ  1 tab daily   30 with 5 refills     Lipitor 10 mg  1 tab daily  30 with 5 refills     Dr Kristen Landers

## 2022-11-04 ENCOUNTER — OFFICE VISIT (OUTPATIENT)
Dept: URGENT CARE | Facility: CLINIC | Age: 62
End: 2022-11-04

## 2022-11-04 ENCOUNTER — APPOINTMENT (OUTPATIENT)
Dept: RADIOLOGY | Facility: CLINIC | Age: 62
End: 2022-11-04

## 2022-11-04 VITALS
TEMPERATURE: 97.4 F | OXYGEN SATURATION: 97 % | SYSTOLIC BLOOD PRESSURE: 123 MMHG | HEART RATE: 60 BPM | HEIGHT: 67 IN | BODY MASS INDEX: 49.44 KG/M2 | WEIGHT: 315 LBS | RESPIRATION RATE: 22 BRPM | DIASTOLIC BLOOD PRESSURE: 70 MMHG

## 2022-11-04 DIAGNOSIS — Z87.19 HISTORY OF BARRETT'S ESOPHAGUS: ICD-10-CM

## 2022-11-04 DIAGNOSIS — R06.2 WHEEZING: ICD-10-CM

## 2022-11-04 DIAGNOSIS — R06.2 WHEEZING: Primary | ICD-10-CM

## 2022-11-04 DIAGNOSIS — J98.9 RESPIRATORY ILLNESS: ICD-10-CM

## 2022-11-04 RX ORDER — OMEPRAZOLE 20 MG/1
CAPSULE, DELAYED RELEASE ORAL
COMMUNITY
Start: 2022-10-12

## 2022-11-04 RX ORDER — ALBUTEROL SULFATE 90 UG/1
2 AEROSOL, METERED RESPIRATORY (INHALATION) EVERY 6 HOURS PRN
Qty: 8.5 G | Refills: 0 | Status: SHIPPED | OUTPATIENT
Start: 2022-11-04

## 2022-11-04 RX ORDER — METHYLPREDNISOLONE 4 MG/1
TABLET ORAL
Qty: 1 EACH | Refills: 0 | Status: SHIPPED | OUTPATIENT
Start: 2022-11-04

## 2022-11-04 RX ORDER — OMEPRAZOLE 20 MG/1
CAPSULE, DELAYED RELEASE ORAL
COMMUNITY

## 2022-11-04 RX ORDER — ALBUTEROL SULFATE 90 UG/1
2 AEROSOL, METERED RESPIRATORY (INHALATION) EVERY 6 HOURS PRN
Qty: 8.5 G | Refills: 0 | Status: SHIPPED | OUTPATIENT
Start: 2022-11-04 | End: 2022-11-04 | Stop reason: SDUPTHER

## 2022-11-04 RX ORDER — METHYLPREDNISOLONE 4 MG/1
TABLET ORAL
Qty: 1 EACH | Refills: 0 | Status: SHIPPED | OUTPATIENT
Start: 2022-11-04 | End: 2022-11-04 | Stop reason: SDUPTHER

## 2022-11-04 NOTE — PROGRESS NOTES
[address]        NAME: Lawrence San is a 58 y o  male  : 1960    MRN: [id number]  DATE: 2022  TIME: 2:02 PM    Assessment and Plan   Wheezing [R06 2]  1  Wheezing  XR chest pa & lateral    methylPREDNISolone 4 MG tablet therapy pack    albuterol (ProAir HFA) 90 mcg/act inhaler    DISCONTINUED: methylPREDNISolone 4 MG tablet therapy pack    DISCONTINUED: albuterol (ProAir HFA) 90 mcg/act inhaler   2  Respiratory illness  methylPREDNISolone 4 MG tablet therapy pack    albuterol (ProAir HFA) 90 mcg/act inhaler    DISCONTINUED: methylPREDNISolone 4 MG tablet therapy pack    DISCONTINUED: albuterol (ProAir HFA) 90 mcg/act inhaler   3  History of Calderon's esophagus           Patient Instructions       Follow up with PCP in 3-5 days  Proceed to  ER if symptoms worsen  Chief Complaint     Chief Complaint   Patient presents with   • Wheezing     Chest congestion for 5 days with wheezing, not improved         History of Present Illness       This is a 58year old male who states that he has had wheezing x 5 days along with chest tightness  He denies any lung disease such as COPD, emphysema or asthma  He states he feels good other than the wheezing  Denies fevers, chills, n/v/d  He states he did a covid test this am and was negative  He states that his friend who was a nurse here told him he need steroids  He hasn't taken his lasix in "months, because if makes me go to the bathroom"  Denies weight gain  Denies hx of CHF - states only takes if for swelling in his legs  He was taking mucinex and robitussin for his symptoms w/o relief  Pt states he is able to take prednisone - he states he only gets tachycardia and palpations if he is on it for a long period of time  He states last shoulder injection was 1 yr ago  Review of Systems   Review of Systems   Constitutional: Negative  HENT: Negative  Eyes: Negative      Respiratory: Positive for chest tightness and wheezing  Cardiovascular: Negative  Gastrointestinal: Negative  Endocrine: Negative  Genitourinary: Negative  Musculoskeletal: Negative  Allergic/Immunologic: Negative  Neurological: Negative  Hematological: Negative  Psychiatric/Behavioral: Negative  Current Medications       Current Outpatient Medications:   •  albuterol (ProAir HFA) 90 mcg/act inhaler, Inhale 2 puffs every 6 (six) hours as needed for wheezing or shortness of breath, Disp: 8 5 g, Rfl: 0  •  ALPRAZolam (XANAX) 1 mg tablet, Take 1 mg by mouth daily at bedtime, Disp: , Rfl:   •  aspirin (ECOTRIN) 325 mg EC tablet, Take 325 mg by mouth, Disp: , Rfl:   •  atorvastatin (LIPITOR) 10 mg tablet, Take 1 tablet (10 mg total) by mouth daily, Disp: 30 tablet, Rfl: 5  •  busPIRone (BUSPAR) 5 mg tablet, take 2 tablets by oral route in the morning and 1 tablet in the evening , Disp: , Rfl:   •  famotidine (PEPCID) 20 mg tablet, Take 20 mg by mouth daily, Disp: , Rfl:   •  FLUoxetine (PROzac) 20 mg capsule, Take 40 mg by mouth daily In the morning, Disp: , Rfl:   •  furosemide (LASIX) 20 mg tablet, Take one two days per week for edema (Patient taking differently: Take one two days per week for edema as needed), Disp: 15 tablet, Rfl: 5  •  methylPREDNISolone 4 MG tablet therapy pack, Use as directed on package, Disp: 1 each, Rfl: 0  •  metoprolol succinate (TOPROL-XL) 50 mg 24 hr tablet, TAKE 1 TABLET DAILY  , Disp: 30 tablet, Rfl: 5  •  omeprazole (PriLOSEC) 20 mg delayed release capsule, omeprazole 20 mg capsule,delayed release, Disp: , Rfl:   •  omeprazole (PriLOSEC) 20 mg delayed release capsule, , Disp: , Rfl:   •  pregabalin (LYRICA) 150 mg capsule, Take 75 mg by mouth 2 (two) times a day  , Disp: , Rfl:   •  busPIRone (BUSPAR) 5 mg tablet, take 2 tablets in the morning and 1 tablet at night (Patient not taking: No sig reported), Disp: , Rfl:   •  senna-docusate sodium (SENOKOT S) 8 6-50 mg per tablet, Take 1 tablet by mouth 2 (two) times a day as needed, Disp: , Rfl:   •  traMADol (ULTRAM) 50 mg tablet, Take 50 mg by mouth every 6 (six) hours as needed   (Patient not taking: No sig reported), Disp: , Rfl:   •  UNKNOWN TO PATIENT, , Disp: , Rfl:   •  zolpidem (AMBIEN) 5 mg tablet, Take 5 mg by mouth daily at bedtime as needed for sleep  (Patient not taking: No sig reported), Disp: , Rfl:     Current Allergies     Allergies as of 11/04/2022 - Reviewed 11/04/2022   Allergen Reaction Noted   • Diphenhydramine Other (See Comments) and Palpitations 11/28/2012   • Prednisone Palpitations 10/29/2019            The following portions of the patient's history were reviewed and updated as appropriate: allergies, current medications, past family history, past medical history, past social history, past surgical history and problem list      Past Medical History:   Diagnosis Date   • Anxiety    • Arthritis    • Athscl heart disease of native coronary artery w/o ang pctrs    • Atrial fibrillation ([facility] [address] ) 2012 and 2016   • CPAP (continuous positive airway pressure) dependence    • Diverticulitis    • Edema     Legs and hands   • Essential tremor    • Hard of hearing    • Hx of cardiovascular stress test 04/28/2016    EF0 50 (50%) normal LVSF  No evidence for prior ischemia or MI   • Hx of echocardiogram 05/18/2016    EF0 55 (55%) severely technically limited suboptimal study  No significant valvular insufficiency or stenosis   / EF0 60 (60%) Trace MR and TR 6/22/17  / EF0 65 (65%) technically difficult study  no hemodynamically significant valve disease identified   11/30/17     • Hypercholesteremia    • Hypertension    • Mixed hyperlipidemia    • Morbid obesity ([facility] [address] )    • Obesity    • Panic attacks    • Rash     On extremeties and chest  Chronic, recurrent   • Sleep apnea    • Varicose veins of left lower extremity     Bilateral       Past Surgical History:   Procedure Laterality Date   • ABLATION RADIO FREQUENCY ATRIAL (MAZE/CRYOABLATION)  02/08/2018 Cryoablation lesion summary, 10 applications were delivered  • CARDIAC CATHETERIZATION  11/2012    No sig CAD  6/26/17   • COLONOSCOPY     • JOINT REPLACEMENT Bilateral     knees   • PA TOTAL KNEE ARTHROPLASTY Bilateral 6/1/2016    Procedure: ARTHROPLASTY KNEE TOTAL BILATERAL;  Surgeon: Gaston Segundo MD;  Location: [facility];  Service: Orthopedics   • SPINE SURGERY  2019    ACDF C6-7       Family History   Problem Relation Age of Onset   • Coronary artery disease Family         less than 61 yrs of age   • Heart attack Mother         MI   • Heart attack Sister         MI   • Heart attack Brother         MI         Medications have been verified  Objective   /70   Pulse 60   Temp (!) 97 4 °F (36 3 °C)   Resp 22   Ht 5' 7" (1 702 m)   Wt (!) 149 kg (329 lb 6 4 oz)   SpO2 97%   BMI 51 59 kg/m²   No LMP for male patient  Physical Exam     Physical Exam  Vitals and nursing note reviewed  Constitutional:       General: He is not in acute distress  Appearance: Normal appearance  He is obese  He is not ill-appearing, toxic-appearing or diaphoretic  HENT:      Head: Normocephalic and atraumatic  Right Ear: Tympanic membrane and ear canal normal       Left Ear: Tympanic membrane and ear canal normal       Nose: Nose normal  No congestion or rhinorrhea  Mouth/Throat:      Mouth: Mucous membranes are moist       Pharynx: Oropharynx is clear  No oropharyngeal exudate or posterior oropharyngeal erythema  Eyes:      Extraocular Movements: Extraocular movements intact  Cardiovascular:      Rate and Rhythm: Normal rate and regular rhythm  Pulses: Normal pulses  Heart sounds: Normal heart sounds  Comments: +1-2 non pitting edema (morbid obesity)   Pulmonary:      Effort: Pulmonary effort is normal  No respiratory distress  Breath sounds: No stridor  Wheezing present  No rhonchi or rales        Comments: Wheezes I/E heard in right upper and lower lobe   Audible upper airway(trachea ) wheezing  Chest:      Chest wall: No tenderness  Abdominal:      General: There is no distension  Palpations: Abdomen is soft  Tenderness: There is no abdominal tenderness  Musculoskeletal:         General: Normal range of motion  Cervical back: Normal range of motion and neck supple  Skin:     General: Skin is warm and dry  Capillary Refill: Capillary refill takes less than 2 seconds  Neurological:      General: No focal deficit present  Mental Status: He is alert and oriented to person, place, and time  Psychiatric:         Mood and Affect: Mood normal          Behavior: Behavior normal          Thought Content: Thought content normal          Judgment: Judgment normal                  Preliminary reading chest xray  No acute process seen  No obvious signs of CHF or pleural effusion   Waiting on rad read          [address] to cancel albuterol and MDP at this pharmacy per pt request   He has requested paper prescriptions

## 2022-11-04 NOTE — PATIENT INSTRUCTIONS
Your xray was preliminarily read by your provider  A radiologist will read the xray and you will be notified if it is abnormal     You are to take the low dose steroid - stop if having side effects  You may use the albuterol rescue inhaler for wheezing or shortness of breath - this can cause heart palpitations as well - monitor  If symptoms worsen over the weekend you are to go to the ED  You are to see your PCP in 2-3 days    Take your lasix as needed - discuss with your PCP as well

## 2022-11-23 ENCOUNTER — APPOINTMENT (OUTPATIENT)
Dept: LAB | Facility: MEDICAL CENTER | Age: 62
End: 2022-11-23

## 2022-11-23 DIAGNOSIS — R63.5 WEIGHT GAIN: ICD-10-CM

## 2022-11-23 DIAGNOSIS — R73.9 ELEVATED BLOOD SUGAR: ICD-10-CM

## 2022-11-23 DIAGNOSIS — R60.9 EDEMA, UNSPECIFIED TYPE: ICD-10-CM

## 2022-11-23 DIAGNOSIS — E78.5 HYPERLIPIDEMIA, UNSPECIFIED HYPERLIPIDEMIA TYPE: ICD-10-CM

## 2022-11-23 DIAGNOSIS — I49.9 CARDIAC ARRHYTHMIA, UNSPECIFIED CARDIAC ARRHYTHMIA TYPE: ICD-10-CM

## 2022-11-23 DIAGNOSIS — R35.1 NOCTURIA: ICD-10-CM

## 2022-11-23 DIAGNOSIS — I10 HYPERTENSION, UNSPECIFIED TYPE: ICD-10-CM

## 2022-11-23 LAB
ALBUMIN SERPL BCP-MCNC: 3.2 G/DL (ref 3.5–5)
ALP SERPL-CCNC: 61 U/L (ref 46–116)
ALT SERPL W P-5'-P-CCNC: 34 U/L (ref 12–78)
ANION GAP SERPL CALCULATED.3IONS-SCNC: 4 MMOL/L (ref 4–13)
AST SERPL W P-5'-P-CCNC: 24 U/L (ref 5–45)
BASOPHILS # BLD AUTO: 0.03 THOUSANDS/ÂΜL (ref 0–0.1)
BASOPHILS NFR BLD AUTO: 1 % (ref 0–1)
BILIRUB SERPL-MCNC: 0.65 MG/DL (ref 0.2–1)
BUN SERPL-MCNC: 13 MG/DL (ref 5–25)
CALCIUM ALBUM COR SERPL-MCNC: 9.6 MG/DL (ref 8.3–10.1)
CALCIUM SERPL-MCNC: 9 MG/DL (ref 8.3–10.1)
CHLORIDE SERPL-SCNC: 106 MMOL/L (ref 96–108)
CHOLEST SERPL-MCNC: 143 MG/DL
CO2 SERPL-SCNC: 28 MMOL/L (ref 21–32)
CREAT SERPL-MCNC: 0.9 MG/DL (ref 0.6–1.3)
EOSINOPHIL # BLD AUTO: 0.24 THOUSAND/ÂΜL (ref 0–0.61)
EOSINOPHIL NFR BLD AUTO: 4 % (ref 0–6)
ERYTHROCYTE [DISTWIDTH] IN BLOOD BY AUTOMATED COUNT: 14.4 % (ref 11.6–15.1)
EST. AVERAGE GLUCOSE BLD GHB EST-MCNC: 105 MG/DL
GFR SERPL CREATININE-BSD FRML MDRD: 91 ML/MIN/1.73SQ M
GLUCOSE P FAST SERPL-MCNC: 100 MG/DL (ref 65–99)
HBA1C MFR BLD: 5.3 %
HCT VFR BLD AUTO: 44.8 % (ref 36.5–49.3)
HDLC SERPL-MCNC: 45 MG/DL
HGB BLD-MCNC: 14.8 G/DL (ref 12–17)
IMM GRANULOCYTES # BLD AUTO: 0.02 THOUSAND/UL (ref 0–0.2)
IMM GRANULOCYTES NFR BLD AUTO: 0 % (ref 0–2)
LDLC SERPL CALC-MCNC: 82 MG/DL (ref 0–100)
LYMPHOCYTES # BLD AUTO: 0.94 THOUSANDS/ÂΜL (ref 0.6–4.47)
LYMPHOCYTES NFR BLD AUTO: 17 % (ref 14–44)
MCH RBC QN AUTO: 28.8 PG (ref 26.8–34.3)
MCHC RBC AUTO-ENTMCNC: 33 G/DL (ref 31.4–37.4)
MCV RBC AUTO: 87 FL (ref 82–98)
MONOCYTES # BLD AUTO: 0.44 THOUSAND/ÂΜL (ref 0.17–1.22)
MONOCYTES NFR BLD AUTO: 8 % (ref 4–12)
NEUTROPHILS # BLD AUTO: 3.9 THOUSANDS/ÂΜL (ref 1.85–7.62)
NEUTS SEG NFR BLD AUTO: 70 % (ref 43–75)
NONHDLC SERPL-MCNC: 98 MG/DL
NRBC BLD AUTO-RTO: 0 /100 WBCS
PLATELET # BLD AUTO: 179 THOUSANDS/UL (ref 149–390)
PMV BLD AUTO: 9.9 FL (ref 8.9–12.7)
POTASSIUM SERPL-SCNC: 4.4 MMOL/L (ref 3.5–5.3)
PROT SERPL-MCNC: 7.3 G/DL (ref 6.4–8.4)
PSA SERPL-MCNC: 0.6 NG/ML (ref 0–4)
RBC # BLD AUTO: 5.14 MILLION/UL (ref 3.88–5.62)
SODIUM SERPL-SCNC: 138 MMOL/L (ref 135–147)
T4 FREE SERPL-MCNC: 0.77 NG/DL (ref 0.76–1.46)
TRIGL SERPL-MCNC: 82 MG/DL
TSH SERPL DL<=0.05 MIU/L-ACNC: 2.45 UIU/ML (ref 0.45–4.5)
WBC # BLD AUTO: 5.57 THOUSAND/UL (ref 4.31–10.16)

## 2022-12-07 ENCOUNTER — OFFICE VISIT (OUTPATIENT)
Dept: CARDIOLOGY CLINIC | Facility: CLINIC | Age: 62
End: 2022-12-07

## 2022-12-07 VITALS
BODY MASS INDEX: 49.44 KG/M2 | DIASTOLIC BLOOD PRESSURE: 80 MMHG | WEIGHT: 315 LBS | SYSTOLIC BLOOD PRESSURE: 130 MMHG | HEART RATE: 59 BPM | HEIGHT: 67 IN

## 2022-12-07 DIAGNOSIS — I71.21 ANEURYSM OF ASCENDING AORTA WITHOUT RUPTURE: ICD-10-CM

## 2022-12-07 DIAGNOSIS — I10 ESSENTIAL (PRIMARY) HYPERTENSION: Chronic | ICD-10-CM

## 2022-12-07 DIAGNOSIS — I25.10 CORONARY ARTERIOSCLEROSIS IN NATIVE ARTERY: Chronic | ICD-10-CM

## 2022-12-07 DIAGNOSIS — I48.0 PAROXYSMAL ATRIAL FIBRILLATION (HCC): Primary | ICD-10-CM

## 2022-12-07 DIAGNOSIS — R91.1 PULMONARY NODULE: ICD-10-CM

## 2022-12-07 NOTE — PROGRESS NOTES
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 no cardiac symptoms doing so  The following portions of the patient's history were reviewed and updated as appropriate: allergies, current medications, past family history, past medical history, past social history, past surgical history and problem list   Review of Systems   Constitutional: Negative for chills, diaphoresis, malaise/fatigue and weight gain  HENT: Negative for nosebleeds and stridor  Eyes: Negative for double vision, vision loss in left eye, vision loss in right eye and visual disturbance  Cardiovascular: Negative for chest pain, claudication, cyanosis, dyspnea on exertion, irregular heartbeat, leg swelling, near-syncope, orthopnea, palpitations, paroxysmal nocturnal dyspnea and syncope  Respiratory: Negative for cough, shortness of breath, snoring and wheezing  Endocrine: Negative for polydipsia, polyphagia and polyuria  Hematologic/Lymphatic: Negative for bleeding problem  Does not bruise/bleed easily  Skin: Negative for flushing and rash  Musculoskeletal: Negative for falls and myalgias  Gastrointestinal: Negative for abdominal pain, heartburn, hematemesis, hematochezia, melena and nausea  Genitourinary: Negative for hematuria  Neurological: Negative for brief paralysis, dizziness, focal weakness, headaches, light-headedness, loss of balance and vertigo  Psychiatric/Behavioral: Negative for altered mental status and substance abuse  Allergic/Immunologic: Negative for hives            Objective:      /80   Pulse 59   Ht 5' 7" (1 702 m)   Wt (!) 151 kg (333 lb)   BMI 52 16 kg/m²   Physical Exam  Constitutional:       General: He is not in acute distress  Appearance: He is well-developed  He is not diaphoretic  HENT:      Head: Normocephalic and atraumatic  Eyes:      General: No scleral icterus  Pupils: Pupils are equal, round, and reactive to light  Neck:      Thyroid: No thyromegaly  Vascular: No carotid bruit or JVD  Cardiovascular:      Rate and Rhythm: Normal rate and regular rhythm  Heart sounds: Normal heart sounds  No murmur heard  No friction rub  No gallop  Pulmonary:      Effort: Pulmonary effort is normal  No respiratory distress  Breath sounds: Normal breath sounds  No stridor  No wheezing or rales  Abdominal:      General: Bowel sounds are normal  There is no distension  Palpations: Abdomen is soft  There is no mass  Tenderness: There is no abdominal tenderness  Musculoskeletal:         General: No deformity  Normal range of motion  Cervical back: Normal range of motion and neck supple  Right lower leg: No edema  Left lower leg: No edema  Skin:     General: Skin is warm and dry  Coloration: Skin is not pale  Findings: No erythema  Neurological:      Mental Status: He is alert and oriented to person, place, and time  Coordination: Coordination normal    Psychiatric:         Mood and Affect: Mood normal          Behavior: Behavior normal          Thought Content:  Thought content normal          Judgment: Judgment normal          Lab Review:   Appointment on 11/23/2022   Component Date Value   • WBC 11/23/2022 5 57    • RBC 11/23/2022 5 14    • Hemoglobin 11/23/2022 14 8    • Hematocrit 11/23/2022 44 8    • MCV 11/23/2022 87    • MCH 11/23/2022 28 8    • MCHC 11/23/2022 33 0    • RDW 11/23/2022 14 4    • MPV 11/23/2022 9 9    • Platelets 77/73/4323 179    • nRBC 11/23/2022 0    • Neutrophils Relative 11/23/2022 70    • Immat GRANS % 11/23/2022 0    • Lymphocytes Relative 11/23/2022 17    • Monocytes Relative 11/23/2022 8    • Eosinophils Relative 11/23/2022 4    • Basophils Relative 11/23/2022 1    • Neutrophils Absolute 11/23/2022 3 90    • Immature Grans Absolute 11/23/2022 0 02    • Lymphocytes Absolute 11/23/2022 0 94    • Monocytes Absolute 11/23/2022 0 44    • Eosinophils Absolute 11/23/2022 0 24    • Basophils Absolute 11/23/2022 0 03    • Sodium 11/23/2022 138    • Potassium 11/23/2022 4 4    • Chloride 11/23/2022 106    • CO2 11/23/2022 28    • ANION GAP 11/23/2022 4    • BUN 11/23/2022 13    • Creatinine 11/23/2022 0 90    • Glucose, Fasting 11/23/2022 100 (H)    • Calcium 11/23/2022 9 0    • Corrected Calcium 11/23/2022 9 6    • AST 11/23/2022 24    • ALT 11/23/2022 34    • Alkaline Phosphatase 11/23/2022 61    • Total Protein 11/23/2022 7 3    • Albumin 11/23/2022 3 2 (L)    • Total Bilirubin 11/23/2022 0 65    • eGFR 11/23/2022 91    • Hemoglobin A1C 11/23/2022 5 3    • EAG 11/23/2022 105    • Cholesterol 11/23/2022 143    • Triglycerides 11/23/2022 82    • HDL, Direct 11/23/2022 45    • LDL Calculated 11/23/2022 82    • Non-HDL-Chol (CHOL-HDL) 11/23/2022 98    • PSA 11/23/2022 0 6    • TSH 3RD GENERATON 11/23/2022 2 450    • Free T4 11/23/2022 0 77      No results found  Assessment:       1  Paroxysmal atrial fibrillation (HCC)  POCT ECG      2  Aneurysm of ascending aorta without rupture  CTA chest wo w contrast      3  Pulmonary nodule  CTA chest wo w contrast      4  Essential (primary) hypertension        5  Coronary arteriosclerosis in native artery             Plan:       Henny Denton is without signs or symptoms reminiscent of angina heart failure nor electrical instability  No evidence of recurrent atrial fibrillation status post remote ablation  For pulmonary nodule and thoracic aneurysm follow-up I have ordered a CTA of the chest     We reviewed his cardiac regimen, met with his blood pressure and lipid control, and I recommended no changes today      I will see him back in 6 months, he will call sooner with any concerning potential cardiac symptoms in the meantime

## 2022-12-07 NOTE — PATIENT INSTRUCTIONS
Cholesterol and Your Health   AMBULATORY CARE:   Cholesterol  is a waxy, fat-like substance  Your body uses cholesterol to make hormones and new cells, and to protect nerves  Cholesterol is made by your body  It also comes from certain foods you eat, such as meat and dairy products  Your healthcare provider can help you set goals for your cholesterol levels  He or she can help you create a plan to meet your goals  Cholesterol level goals: Your cholesterol level goals depend on your risk for heart disease, your age, and your other health conditions  The following are general guidelines: Total cholesterol  includes low-density lipoprotein (LDL), high-density lipoprotein (HDL), and triglyceride levels  The total cholesterol level should be lower than 200 mg/dL and is best at about 150 mg/dL  LDL cholesterol  is called bad cholesterol  because it forms plaque in your arteries  As plaque builds up, your arteries become narrow, and less blood flows through  When plaque decreases blood flow to your heart, you may have chest pain  If plaque completely blocks an artery that brings blood to your heart, you may have a heart attack  Plaque can break off and form blood clots  Blood clots may block arteries in your brain and cause a stroke  The level should be less than 130 mg/dL and is best at about 100 mg/dL  HDL cholesterol  is called good cholesterol  because it helps remove LDL cholesterol from your arteries  It does this by attaching to LDL cholesterol and carrying it to your liver  Your liver breaks down LDL cholesterol so your body can get rid of it  High levels of HDL cholesterol can help prevent a heart attack and stroke  Low levels of HDL cholesterol can increase your risk for heart disease, heart attack, and stroke  The level should be 60 mg/dL or higher  Triglycerides  are a type of fat that store energy from foods you eat  High levels of triglycerides also cause plaque buildup   This can increase your risk for a heart attack or stroke  If your triglyceride level is high, your LDL cholesterol level may also be high  The level should be less than 150 mg/dL  Any of the following can increase your risk for high cholesterol:   Smoking cigarettes    Being overweight or obese, or not getting enough exercise    Drinking large amounts of alcohol    A medical condition such as hypertension (high blood pressure) or diabetes    Certain genes passed from your parents to you    Age older than 65 years    What you need to know about having your cholesterol levels checked: Adults 21to 39years of age should have their cholesterol levels checked every 4 to 6 years  Adults 45 years or older should have their cholesterol checked every 1 to 2 years  You may need your cholesterol checked more often, or at a younger age, if you have risk factors for heart disease  You may also need to have your cholesterol checked more often if you have other health conditions, such as diabetes  Blood tests are used to check cholesterol levels  Blood tests measure your levels of triglycerides, LDL cholesterol, and HDL cholesterol  How healthy fats affect your cholesterol levels:  Healthy fats, also called unsaturated fats, help lower LDL cholesterol and triglyceride levels  Healthy fats include the following:  Monounsaturated fats  are found in foods such as olive oil, canola oil, avocado, nuts, and olives  Polyunsaturated fats,  such as omega 3 fats, are found in fish, such as salmon, trout, and tuna  They can also be found in plant foods such as flaxseed, walnuts, and soybeans  How unhealthy fats affect your cholesterol levels:  Unhealthy fats increase LDL cholesterol and triglyceride levels  They are found in foods high in cholesterol, saturated fat, and trans fat:  Cholesterol  is found in eggs, dairy, and meat  Saturated fat  is found in butter, cheese, ice cream, whole milk, and coconut oil   Saturated fat is also found in meat, such as sausage, hot dogs, and bologna  Trans fat  is found in liquid oils and is used in fried and baked foods  Foods that contain trans fats include chips, crackers, muffins, sweet rolls, microwave popcorn, and cookies  Treatment  for high cholesterol will also decrease your risk of heart disease, heart attack, and stroke  Treatment may include any of the following:  Lifestyle changes  may include food, exercise, weight loss, and quitting smoking  You may also need to decrease the amount of alcohol you drink  Your healthcare provider will want you to start with lifestyle changes  Other treatment may be added if lifestyle changes are not enough  Your healthcare provider may recommend you work with a team to manage hyperlipidemia  The team may include medical experts such as a dietitian, an exercise or physical therapist, and a behavior therapist  Your family members may be included in helping you create lifestyle changes  Medicines  may be given to lower your LDL cholesterol, triglyceride levels, or total cholesterol level  You may need medicines to lower your cholesterol if any of the following is true:    You have a history of stroke, TIA, unstable angina, or a heart attack  Your LDL cholesterol level is 190 mg/dL or higher  You are age 36 to 76 years, have diabetes or heart disease risk factors, and your LDL cholesterol is 70 mg/dL or higher  Supplements  include fish oil, red yeast rice, and garlic  Fish oil may help lower your triglyceride and LDL cholesterol levels  It may also increase your HDL cholesterol level  Red yeast rice may help decrease your total cholesterol level and LDL cholesterol level  Garlic may help lower your total cholesterol level  Do not take any supplements without talking to your healthcare provider  Food changes you can make to lower your cholesterol levels:  A dietitian can help you create a healthy eating plan   He or she can show you how to read food labels and choose foods low in saturated fat, trans fats, and cholesterol  Decrease the total amount of fat you eat  Choose lean meats, fat-free or 1% fat milk, and low-fat dairy products, such as yogurt and cheese  Try to limit or avoid red meats  Limit or do not eat fried foods or baked goods, such as cookies  Replace unhealthy fats with healthy fats  Cook foods in olive oil or canola oil  Choose soft margarines that are low in saturated fat and trans fat  Seeds, nuts, and avocados are other examples of healthy fats  Eat foods with omega-3 fats  Examples include salmon, tuna, mackerel, walnuts, and flaxseed  Eat fish 2 times per week  Pregnant women should not eat fish that have high levels of mercury, such as shark, swordfish, and jaspal mackerel  Increase the amount of high-fiber foods you eat  High-fiber foods can help lower your LDL cholesterol  Aim to get between 20 and 30 grams of fiber each day  Fruits and vegetables are high in fiber  Eat at least 5 servings each day  Other high-fiber foods are whole-grain or whole-wheat breads, pastas, or cereals, and brown rice  Eat 3 ounces of whole-grain foods each day  Increase fiber slowly  You may have abdominal discomfort, bloating, and gas if you add fiber to your diet too quickly  Eat healthy protein foods  Examples include low-fat dairy products, skinless chicken and turkey, fish, and nuts  Limit foods and drinks that are high in sugar  Your dietitian or healthcare provider can help you create daily limits for high-sugar foods and drinks  The limit may be lower if you have diabetes or another health condition  Limits can also help you lose weight if needed  Lifestyle changes you can make to lower your cholesterol levels:   Maintain a healthy weight  Ask your healthcare provider what a healthy weight is for you  Ask him or her to help you create a weight loss plan if needed   Weight loss can decrease your total cholesterol and triglyceride levels  Weight loss may also help keep your blood pressure at a healthy level  Be physically active throughout the day  Physical activity, such as exercise, can help lower your total cholesterol level and maintain a healthy weight  Physical activity can also help increase your HDL cholesterol level  Work with your healthcare provider to create an program that is right for you  Get at least 30 to 40 minutes of moderate physical activity most days of the week  Examples of exercise include brisk walking, swimming, or biking  Also include strength training at least 2 times each week  Your healthcare providers can help you create a physical activity plan  Do not smoke  Nicotine and other chemicals in cigarettes and cigars can raise your cholesterol levels  Ask your healthcare provider for information if you currently smoke and need help to quit  E-cigarettes or smokeless tobacco still contain nicotine  Talk to your healthcare provider before you use these products  Limit or do not drink alcohol  Alcohol can increase your triglyceride levels  Ask your healthcare provider before you drink alcohol  Ask how much is okay for you to drink in 24 hours or 1 week  Follow up with your doctor as directed:  Write down your questions so you remember to ask them during your visits  © Copyright [employer] 2022 Information is for End User's use only and may not be sold, redistributed or otherwise used for commercial purposes  All illustrations and images included in CareNotes® are the copyrighted property of [employer]  or [facility] Farhana Kyle  The above information is an  only  It is not intended as medical advice for individual conditions or treatments  Talk to your doctor, nurse or pharmacist before following any medical regimen to see if it is safe and effective for you

## 2022-12-13 ENCOUNTER — HOSPITAL ENCOUNTER (OUTPATIENT)
Dept: CT IMAGING | Facility: HOSPITAL | Age: 62
Discharge: HOME | End: 2022-12-13
Attending: INTERNAL MEDICINE

## 2022-12-13 DIAGNOSIS — R91.1 PULMONARY NODULE: ICD-10-CM

## 2022-12-13 DIAGNOSIS — I71.21 ANEURYSM OF ASCENDING AORTA WITHOUT RUPTURE: ICD-10-CM

## 2022-12-13 RX ADMIN — IOHEXOL 100 ML: 350 INJECTION, SOLUTION INTRAVENOUS at 16:26

## 2023-02-15 ENCOUNTER — APPOINTMENT (OUTPATIENT)
Dept: RADIOLOGY | Facility: CLINIC | Age: 63
End: 2023-02-15

## 2023-02-15 ENCOUNTER — OFFICE VISIT (OUTPATIENT)
Dept: URGENT CARE | Facility: CLINIC | Age: 63
End: 2023-02-15

## 2023-02-15 ENCOUNTER — HOSPITAL ENCOUNTER (EMERGENCY)
Facility: HOSPITAL | Age: 63
Discharge: HOME | End: 2023-02-15
Attending: EMERGENCY MEDICINE

## 2023-02-15 VITALS
DIASTOLIC BLOOD PRESSURE: 74 MMHG | SYSTOLIC BLOOD PRESSURE: 162 MMHG | HEART RATE: 68 BPM | WEIGHT: 315 LBS | OXYGEN SATURATION: 96 % | RESPIRATION RATE: 22 BRPM | TEMPERATURE: 98.1 F | BODY MASS INDEX: 52.14 KG/M2

## 2023-02-15 VITALS
BODY MASS INDEX: 51.69 KG/M2 | DIASTOLIC BLOOD PRESSURE: 80 MMHG | TEMPERATURE: 98.4 F | HEART RATE: 70 BPM | RESPIRATION RATE: 24 BRPM | SYSTOLIC BLOOD PRESSURE: 142 MMHG | WEIGHT: 315 LBS | OXYGEN SATURATION: 96 %

## 2023-02-15 DIAGNOSIS — R06.02 SHORTNESS OF BREATH: ICD-10-CM

## 2023-02-15 DIAGNOSIS — R09.89 PULMONARY VASCULAR CONGESTION: Primary | ICD-10-CM

## 2023-02-15 DIAGNOSIS — J98.01 BRONCHOSPASM: ICD-10-CM

## 2023-02-15 DIAGNOSIS — R06.89 ADVENTITIOUS BREATH SOUNDS: ICD-10-CM

## 2023-02-15 DIAGNOSIS — Z86.79 HISTORY OF ATRIAL FIBRILLATION: ICD-10-CM

## 2023-02-15 DIAGNOSIS — E66.01 MORBID OBESITY (HCC): ICD-10-CM

## 2023-02-15 DIAGNOSIS — J06.9 VIRAL UPPER RESPIRATORY TRACT INFECTION WITH COUGH: ICD-10-CM

## 2023-02-15 DIAGNOSIS — H65.02 ACUTE SEROUS OTITIS MEDIA OF LEFT EAR, RECURRENCE NOT SPECIFIED: ICD-10-CM

## 2023-02-15 DIAGNOSIS — J40 BRONCHITIS: Primary | ICD-10-CM

## 2023-02-15 LAB
2HR DELTA HS TROPONIN: 0 NG/L
ALBUMIN SERPL BCP-MCNC: 4 G/DL (ref 3.5–5)
ALP SERPL-CCNC: 59 U/L (ref 34–104)
ALT SERPL W P-5'-P-CCNC: 25 U/L (ref 7–52)
ANION GAP SERPL CALCULATED.3IONS-SCNC: 9 MMOL/L (ref 4–13)
AST SERPL W P-5'-P-CCNC: 20 U/L (ref 13–39)
BASOPHILS # BLD AUTO: 0.05 THOUSANDS/ÂΜL (ref 0–0.1)
BASOPHILS NFR BLD AUTO: 1 % (ref 0–1)
BILIRUB SERPL-MCNC: 0.69 MG/DL (ref 0.2–1)
BNP SERPL-MCNC: 57 PG/ML (ref 0–100)
BUN SERPL-MCNC: 15 MG/DL (ref 5–25)
CALCIUM SERPL-MCNC: 9.2 MG/DL (ref 8.4–10.2)
CARDIAC TROPONIN I PNL SERPL HS: 6 NG/L
CARDIAC TROPONIN I PNL SERPL HS: 6 NG/L
CHLORIDE SERPL-SCNC: 103 MMOL/L (ref 96–108)
CO2 SERPL-SCNC: 26 MMOL/L (ref 21–32)
CREAT SERPL-MCNC: 0.89 MG/DL (ref 0.6–1.3)
EOSINOPHIL # BLD AUTO: 0.33 THOUSAND/ÂΜL (ref 0–0.61)
EOSINOPHIL NFR BLD AUTO: 4 % (ref 0–6)
ERYTHROCYTE [DISTWIDTH] IN BLOOD BY AUTOMATED COUNT: 14.2 % (ref 11.6–15.1)
FLUAV RNA RESP QL NAA+PROBE: NEGATIVE
FLUBV RNA RESP QL NAA+PROBE: NEGATIVE
GFR SERPL CREATININE-BSD FRML MDRD: 91 ML/MIN/1.73SQ M
GLUCOSE SERPL-MCNC: 84 MG/DL (ref 65–140)
HCT VFR BLD AUTO: 47.8 % (ref 36.5–49.3)
HGB BLD-MCNC: 15.8 G/DL (ref 12–17)
IMM GRANULOCYTES # BLD AUTO: 0.02 THOUSAND/UL (ref 0–0.2)
IMM GRANULOCYTES NFR BLD AUTO: 0 % (ref 0–2)
LYMPHOCYTES # BLD AUTO: 1.3 THOUSANDS/ÂΜL (ref 0.6–4.47)
LYMPHOCYTES NFR BLD AUTO: 17 % (ref 14–44)
MCH RBC QN AUTO: 29 PG (ref 26.8–34.3)
MCHC RBC AUTO-ENTMCNC: 33.1 G/DL (ref 31.4–37.4)
MCV RBC AUTO: 88 FL (ref 82–98)
MONOCYTES # BLD AUTO: 0.68 THOUSAND/ÂΜL (ref 0.17–1.22)
MONOCYTES NFR BLD AUTO: 9 % (ref 4–12)
NEUTROPHILS # BLD AUTO: 5.33 THOUSANDS/ÂΜL (ref 1.85–7.62)
NEUTS SEG NFR BLD AUTO: 69 % (ref 43–75)
NRBC BLD AUTO-RTO: 0 /100 WBCS
PLATELET # BLD AUTO: 201 THOUSANDS/UL (ref 149–390)
PMV BLD AUTO: 9.6 FL (ref 8.9–12.7)
POTASSIUM SERPL-SCNC: 4.2 MMOL/L (ref 3.5–5.3)
PROT SERPL-MCNC: 7.3 G/DL (ref 6.4–8.4)
RBC # BLD AUTO: 5.44 MILLION/UL (ref 3.88–5.62)
RSV RNA RESP QL NAA+PROBE: NEGATIVE
SARS-COV-2 RNA RESP QL NAA+PROBE: NEGATIVE
SODIUM SERPL-SCNC: 138 MMOL/L (ref 135–147)
WBC # BLD AUTO: 7.71 THOUSAND/UL (ref 4.31–10.16)

## 2023-02-15 RX ORDER — SODIUM CHLORIDE FOR INHALATION 0.9 %
3 VIAL, NEBULIZER (ML) INHALATION ONCE
Status: COMPLETED | OUTPATIENT
Start: 2023-02-15 | End: 2023-02-15

## 2023-02-15 RX ORDER — AMOXICILLIN AND CLAVULANATE POTASSIUM 875; 125 MG/1; MG/1
1 TABLET, FILM COATED ORAL ONCE
Status: COMPLETED | OUTPATIENT
Start: 2023-02-15 | End: 2023-02-15

## 2023-02-15 RX ORDER — AMOXICILLIN AND CLAVULANATE POTASSIUM 875; 125 MG/1; MG/1
1 TABLET, FILM COATED ORAL EVERY 12 HOURS
Qty: 14 TABLET | Refills: 0 | Status: SHIPPED | OUTPATIENT
Start: 2023-02-15 | End: 2023-02-22

## 2023-02-15 RX ORDER — DEXAMETHASONE SODIUM PHOSPHATE 4 MG/ML
8 INJECTION, SOLUTION INTRA-ARTICULAR; INTRALESIONAL; INTRAMUSCULAR; INTRAVENOUS; SOFT TISSUE ONCE
Status: DISCONTINUED | OUTPATIENT
Start: 2023-02-15 | End: 2023-02-15 | Stop reason: HOSPADM

## 2023-02-15 RX ORDER — ALBUTEROL SULFATE 90 UG/1
2 AEROSOL, METERED RESPIRATORY (INHALATION) EVERY 4 HOURS PRN
Qty: 8.5 G | Refills: 0 | Status: SHIPPED | OUTPATIENT
Start: 2023-02-15

## 2023-02-15 RX ORDER — BUDESONIDE 180 UG/1
2 AEROSOL, POWDER RESPIRATORY (INHALATION) 2 TIMES DAILY
Qty: 1 EACH | Refills: 0 | Status: SHIPPED | OUTPATIENT
Start: 2023-02-15 | End: 2023-02-22

## 2023-02-15 RX ORDER — IPRATROPIUM BROMIDE AND ALBUTEROL SULFATE 2.5; .5 MG/3ML; MG/3ML
3 SOLUTION RESPIRATORY (INHALATION)
Status: DISCONTINUED | OUTPATIENT
Start: 2023-02-15 | End: 2023-02-15 | Stop reason: HOSPADM

## 2023-02-15 RX ADMIN — IPRATROPIUM BROMIDE AND ALBUTEROL SULFATE 3 ML: 2.5; .5 SOLUTION RESPIRATORY (INHALATION) at 14:11

## 2023-02-15 RX ADMIN — AMOXICILLIN AND CLAVULANATE POTASSIUM 1 TABLET: 875; 125 TABLET, FILM COATED ORAL at 15:27

## 2023-02-15 RX ADMIN — ALBUTEROL SULFATE 10 MG: 2.5 SOLUTION RESPIRATORY (INHALATION) at 15:03

## 2023-02-15 RX ADMIN — ISODIUM CHLORIDE 3 ML: 0.03 SOLUTION RESPIRATORY (INHALATION) at 15:03

## 2023-02-15 NOTE — ED PROVIDER NOTES
History  Chief Complaint   Patient presents with   • Shortness of Breath     Arrives via EMS from urgent care d/t  SOB  and xrzy show vascular congestion  Ppt denies CP      60-year-old male presents emergency room noting multiple week history of shortness of breath and cough  Patient notes that he was sick for a few weeks and then it resolved but then his symptoms have returned  The patient is vaccinated against COVID as well as the flu  Patient denies any chest pain or fever or chills and admits to mild cough production  He went to the urgent care and was promptly sent to the ER for evaluation via ambulance  Patient denies significant history of lung issues  The patient does have a history of an aneurysm which she notes is "on his heart "          Prior to Admission Medications   Prescriptions Last Dose Informant Patient Reported? Taking? ALPRAZolam (XANAX) 1 mg tablet   Yes No   Sig: Take 1 mg by mouth daily at bedtime   FLUoxetine (PROzac) 20 mg capsule   Yes No   Sig: Take 40 mg by mouth daily In the morning   UNKNOWN TO PATIENT   Yes No   albuterol (ProAir HFA) 90 mcg/act inhaler   No No   Sig: Inhale 2 puffs every 6 (six) hours as needed for wheezing or shortness of breath   aspirin (ECOTRIN) 325 mg EC tablet   Yes No   Sig: Take 325 mg by mouth   atorvastatin (LIPITOR) 10 mg tablet   No No   Sig: Take 1 tablet (10 mg total) by mouth daily   busPIRone (BUSPAR) 5 mg tablet   Yes No   Sig: take 2 tablets in the morning and 1 tablet at night   Patient not taking: Reported on 5/25/2022   busPIRone (BUSPAR) 5 mg tablet   Yes No   Sig: take 2 tablets by oral route in the morning and 1 tablet in the evening     famotidine (PEPCID) 20 mg tablet   Yes No   Sig: Take 20 mg by mouth daily   furosemide (LASIX) 20 mg tablet   No No   Sig: Take one two days per week for edema   Patient not taking: Reported on 2/15/2023   methylPREDNISolone 4 MG tablet therapy pack   No No   Sig: Use as directed on package   Patient not taking: Reported on 12/7/2022   metoprolol succinate (TOPROL-XL) 50 mg 24 hr tablet   No No   Sig: TAKE 1 TABLET DAILY  omeprazole (PriLOSEC) 20 mg delayed release capsule   Yes No   Sig: omeprazole 20 mg capsule,delayed release   omeprazole (PriLOSEC) 20 mg delayed release capsule   Yes No   pregabalin (LYRICA) 150 mg capsule   Yes No   Sig: Take 75 mg by mouth 2 (two) times a day     senna-docusate sodium (SENOKOT S) 8 6-50 mg per tablet   Yes No   Sig: Take 1 tablet by mouth 2 (two) times a day as needed   traMADol (ULTRAM) 50 mg tablet   Yes No   Sig: Take 50 mg by mouth every 6 (six) hours as needed   zolpidem (AMBIEN) 5 mg tablet   Yes No   Sig: Take 5 mg by mouth daily at bedtime as needed for sleep      Facility-Administered Medications: None       Past Medical History:   Diagnosis Date   • Anxiety    • Arthritis    • Athscl heart disease of native coronary artery w/o ang pctrs    • Atrial fibrillation ([facility] [address] ) 2012 and 2016   • CPAP (continuous positive airway pressure) dependence    • Diverticulitis    • Edema     Legs and hands   • Essential tremor    • Hard of hearing    • Hx of cardiovascular stress test 04/28/2016    EF0 50 (50%) normal LVSF  No evidence for prior ischemia or MI   • Hx of echocardiogram 05/18/2016    EF0 55 (55%) severely technically limited suboptimal study  No significant valvular insufficiency or stenosis   / EF0 60 (60%) Trace MR and TR 6/22/17  / EF0 65 (65%) technically difficult study  no hemodynamically significant valve disease identified   11/30/17     • Hypercholesteremia    • Hypertension    • Mixed hyperlipidemia    • Morbid obesity ([facility] [address] )    • Obesity    • Panic attacks    • Rash     On extremeties and chest  Chronic, recurrent   • Sleep apnea    • Varicose veins of left lower extremity     Bilateral       Past Surgical History:   Procedure Laterality Date   • ABLATION RADIO FREQUENCY ATRIAL (MAZE/CRYOABLATION)  02/08/2018    Cryoablation lesion summary, 10 applications were delivered  • CARDIAC CATHETERIZATION  11/2012    No sig CAD  6/26/17   • COLONOSCOPY     • JOINT REPLACEMENT Bilateral     knees   • NM ARTHRP KNE CONDYLE&PLATU MEDIAL&LAT COMPARTMENTS Bilateral 6/1/2016    Procedure: ARTHROPLASTY KNEE TOTAL BILATERAL;  Surgeon: Cali Portillo MD;  Location: [facility];  Service: Orthopedics   • SPINE SURGERY  2019    ACDF C6-7       Family History   Problem Relation Age of Onset   • Coronary artery disease Family         less than 61 yrs of age   • Heart attack Mother         MI   • Heart attack Sister         MI   • Heart attack Brother         MI     I have reviewed and agree with the history as documented  E-Cigarette/Vaping   • E-Cigarette Use Never User      E-Cigarette/Vaping Substances   • Nicotine No    • THC No    • CBD No    • Flavoring No    • Other No    • Unknown No      Social History     Tobacco Use   • Smoking status: Former     Packs/day: 1 00     Years: 18 00     Pack years: 18 00     Types: Cigarettes   • Smokeless tobacco: Never   • Tobacco comments:     Quit 21 years ago   Vaping Use   • Vaping Use: Never used   Substance Use Topics   • Alcohol use: Yes     Comment: socially   • Drug use: No       Review of Systems   Constitutional: Negative for chills and fever  HENT: Positive for congestion  Negative for ear pain and sore throat  Eyes: Negative for pain and visual disturbance  Respiratory: Positive for cough, chest tightness and shortness of breath  Cardiovascular: Negative for chest pain and palpitations  Gastrointestinal: Negative for abdominal pain and vomiting  Genitourinary: Negative for dysuria and hematuria  Musculoskeletal: Negative for arthralgias and back pain  Skin: Negative for color change and rash  Neurological: Negative for seizures and syncope  All other systems reviewed and are negative  Physical Exam  Physical Exam  Constitutional:       General: He is not in acute distress       Appearance: Normal appearance  He is normal weight  He is not ill-appearing  HENT:      Head: Normocephalic and atraumatic  Right Ear: External ear normal       Left Ear: External ear normal       Nose: Nose normal       Mouth/Throat:      Mouth: Mucous membranes are moist    Eyes:      Conjunctiva/sclera: Conjunctivae normal       Pupils: Pupils are equal, round, and reactive to light  Cardiovascular:      Rate and Rhythm: Normal rate and regular rhythm  Pulses: Normal pulses  Heart sounds: Normal heart sounds  Pulmonary:      Effort: Pulmonary effort is normal  Tachypnea present  Breath sounds: Wheezing and rhonchi present  Abdominal:      General: Abdomen is flat  There is no distension  Palpations: Abdomen is soft  There is no mass  Musculoskeletal:         General: No swelling, tenderness or deformity  Normal range of motion  Cervical back: Normal range of motion  Right lower leg: Edema present  Left lower leg: Edema present  Comments: Trace lower extremity edema bilaterally   Skin:     General: Skin is warm and dry  Capillary Refill: Capillary refill takes 2 to 3 seconds  Coloration: Skin is not pale  Neurological:      General: No focal deficit present  Mental Status: He is alert and oriented to person, place, and time  Mental status is at baseline     Psychiatric:         Mood and Affect: Mood normal          Vital Signs  ED Triage Vitals [02/15/23 1316]   Temperature Pulse Respirations Blood Pressure SpO2   98 1 °F (36 7 °C) 63 (!) 24 149/74 96 %      Temp Source Heart Rate Source Patient Position - Orthostatic VS BP Location FiO2 (%)   Oral Monitor Lying Right arm --      Pain Score       --           Vitals:    02/15/23 1316 02/15/23 1600 02/15/23 1607   BP: 149/74  162/74   Pulse: 63 63 68   Patient Position - Orthostatic VS: Lying  Lying         Visual Acuity      ED Medications  Medications   ipratropium-albuterol (DUO-NEB) 0 5-2 5 mg/3 mL inhalation solution 3 mL (3 mL Nebulization Given 2/15/23 1411)   dexamethasone (DECADRON) injection 8 mg (8 mg Intramuscular Not Given 2/15/23 1507)   albuterol inhalation solution 10 mg (10 mg Nebulization Given 2/15/23 1503)     And   sodium chloride 0 9 % inhalation solution 3 mL (3 mL Nebulization Given 2/15/23 1503)   amoxicillin-clavulanate (AUGMENTIN) 875-125 mg per tablet 1 tablet (1 tablet Oral Given 2/15/23 1527)       Diagnostic Studies  Results Reviewed     Procedure Component Value Units Date/Time    HS Troponin I 2hr [[id number]]  (Normal) Collected: 02/15/23 1606    Lab Status: Final result Specimen: Blood from Arm, Left Updated: 02/15/23 1641     hs TnI 2hr 6 ng/L      Delta 2hr hsTnI 0 ng/L     HS Troponin I 4hr [[id number]]     Lab Status: No result Specimen: Blood     FLU/RSV/COVID - if FLU/RSV clinically relevant [[id number]]  (Normal) Collected: 02/15/23 1408    Lab Status: Final result Specimen: Nares from Nose Updated: 02/15/23 1458     SARS-CoV-2 Negative     INFLUENZA A PCR Negative     INFLUENZA B PCR Negative     RSV PCR Negative    Narrative:      FOR PEDIATRIC PATIENTS - copy/paste COVID Guidelines URL to browser: [email or web address]    SARS-CoV-2 assay is a Nucleic Acid Amplification assay intended for the  qualitative detection of nucleic acid from SARS-CoV-2 in nasopharyngeal  swabs  Results are for the presumptive identification of SARS-CoV-2 RNA  Positive results are indicative of infection with SARS-CoV-2, the virus  causing COVID-19, but do not rule out bacterial infection or co-infection  with other viruses  Laboratories within the [place] and its  territories are required to report all positive results to the appropriate  public health authorities  Negative results do not preclude SARS-CoV-2  infection and should not be used as the sole basis for treatment or other  patient management decisions   Negative results must be combined with  clinical observations, patient history, and epidemiological information  This test has not been FDA cleared or approved  This test has been authorized by FDA under an Emergency Use Authorization  (EUA)  This test is only authorized for the duration of time the  declaration that circumstances exist justifying the authorization of the  emergency use of an in vitro diagnostic tests for detection of SARS-CoV-2  virus and/or diagnosis of COVID-19 infection under section 564(b)(1) of  the Act, 21 U  S C  237XQU-3(Y)(2), unless the authorization is terminated  or revoked sooner  The test has been validated but independent review by FDA  and CLIA is pending  Test performed using [employer] GeneXpert: This RT-PCR assay targets N2,  a region unique to SARS-CoV-2  A conserved region in the E-gene was chosen  for pan-Sarbecovirus detection which includes SARS-CoV-2  According to CMS-2020-01-R, this platform meets the definition of high-throughput technology      B-Type Natriuretic Peptide(BNP) [[id number]]  (Normal) Collected: 02/15/23 1408    Lab Status: Final result Specimen: Blood from Arm, Right Updated: 02/15/23 1448     BNP 57 pg/mL     HS Troponin 0hr (reflex protocol) [[id number]]  (Normal) Collected: 02/15/23 1408    Lab Status: Final result Specimen: Blood from Arm, Right Updated: 02/15/23 1442     hs TnI 0hr 6 ng/L     Comprehensive metabolic panel [[id number]] Collected: 02/15/23 1408    Lab Status: Final result Specimen: Blood from Arm, Right Updated: 02/15/23 1435     Sodium 138 mmol/L      Potassium 4 2 mmol/L      Chloride 103 mmol/L      CO2 26 mmol/L      ANION GAP 9 mmol/L      BUN 15 mg/dL      Creatinine 0 89 mg/dL      Glucose 84 mg/dL      Calcium 9 2 mg/dL      AST 20 U/L      ALT 25 U/L      Alkaline Phosphatase 59 U/L      Total Protein 7 3 g/dL      Albumin 4 0 g/dL      Total Bilirubin 0 69 mg/dL      eGFR 91 ml/min/1 73sq m     Narrative:      [place] guidelines for Chronic Kidney Disease (CKD):   •  Stage 1 with normal or high GFR (GFR > 90 mL/min/1 73 square meters)  •  Stage 2 Mild CKD (GFR = 60-89 mL/min/1 73 square meters)  •  Stage 3A Moderate CKD (GFR = 45-59 mL/min/1 73 square meters)  •  Stage 3B Moderate CKD (GFR = 30-44 mL/min/1 73 square meters)  •  Stage 4 Severe CKD (GFR = 15-29 mL/min/1 73 square meters)  •  Stage 5 End Stage CKD (GFR <15 mL/min/1 73 square meters)  Note: GFR calculation is accurate only with a steady state creatinine    CBC and differential [[id number]] Collected: 02/15/23 1408    Lab Status: Final result Specimen: Blood from Arm, Right Updated: 02/15/23 1421     WBC 7 71 Thousand/uL      RBC 5 44 Million/uL      Hemoglobin 15 8 g/dL      Hematocrit 47 8 %      MCV 88 fL      MCH 29 0 pg      MCHC 33 1 g/dL      RDW 14 2 %      MPV 9 6 fL      Platelets 129 Thousands/uL      nRBC 0 /100 WBCs      Neutrophils Relative 69 %      Immat GRANS % 0 %      Lymphocytes Relative 17 %      Monocytes Relative 9 %      Eosinophils Relative 4 %      Basophils Relative 1 %      Neutrophils Absolute 5 33 Thousands/µL      Immature Grans Absolute 0 02 Thousand/uL      Lymphocytes Absolute 1 30 Thousands/µL      Monocytes Absolute 0 68 Thousand/µL      Eosinophils Absolute 0 33 Thousand/µL      Basophils Absolute 0 05 Thousands/µL                  No orders to display              Procedures  ECG 12 Lead Documentation Only    Date/Time: 2/15/2023 2:59 PM  Performed by: Rica Donato DO  Authorized by: Rica Donato DO     ECG reviewed by me, the ED Provider: yes    Patient location:  ED  Comments:      EKG shows a sinus rhythm at 64 beats a minute with a normal axis  There is no definitive acute ST or T wave changes appreciated             ED Course  ED Course as of 02/15/23 1650   Wed Feb 15, 2023   1404 Chest x-ray reviewed from urgent care    No acute pulmonary pathology   1520 Patient refusing steroid treatment in the emergency department noting that he gets A-fib every time he gets an injectable or oral steroid  Medical Decision Making  44-year-old male presents to the emergency department after being seen by urgent care with shortness of breath described as wheezing and cough  Patient notes that he was sick for a few weeks which improved for a week or 2 and then got worse again  Patient notes he has been working in basement with mold and he tends to have allergic reactions and bronchospasm to mold  Chest x-ray was done at the urgent care was reviewed and found to have no acute pulmonary pathology  The patient had 2 negative troponin tests and a nonacute appearing EKG while in the emergency department and has not been hypoxic  The patient has refused IV, IM and oral steroids due to allergic reaction which is noted to cause the patient to go in atrial fibrillation  Patient notes he has been on inhaled steroids though without issue  The patient will be discharged on Pulmicort as well as albuterol  A nebulizer was dispensed to the patient on discharge  Patient be placed on antibiotics as his symptoms are beyond the acute phase and are more consistent with chronic bronchitis and bronchospasm  Patient to follow-up with his family doctor in a few days for repeat evaluation and to return to the ER if worse  Bronchitis: acute illness or injury  Bronchospasm: acute illness or injury  Amount and/or Complexity of Data Reviewed  Labs: ordered  Radiology: ordered  ECG/medicine tests: ordered  Risk  Prescription drug management            Disposition  Final diagnoses:   Bronchitis   Bronchospasm     Time reflects when diagnosis was documented in both MDM as applicable and the Disposition within this note     Time User Action Codes Description Comment    2/15/2023  4:28 PM Sammy Alvarado Add [J40] Bronchitis     2/15/2023  4:28 PM Laura Conklin Add [J98 01] Bronchospasm       ED Disposition     ED Disposition   Discharge    Condition   Stable    Date/Time   Wed Feb 15, 2023  4:48 PM    Comment   [facility] discharge to home/self care  Follow-up Information     Follow up With Specialties Details Why Starr N DO [facility] Internal Medicine On 2/17/2023  [address] [address]  [phone]            Patient's Medications   Discharge Prescriptions    ALBUTEROL (5 MG/ML) 0 5 % NEBULIZER SOLUTION    Take 0 5 mL (2 5 mg total) by nebulization every 6 (six) hours as needed for wheezing       Start Date: 2/15/2023 End Date: 3/17/2023       Order Dose: 2 5 mg       Quantity: 20 mL    Refills: 0    ALBUTEROL (PROAIR HFA) 90 MCG/ACT INHALER    Inhale 2 puffs every 4 (four) hours as needed for wheezing       Start Date: 2/15/2023 End Date: --       Order Dose: 2 puffs       Quantity: 8 5 g    Refills: 0    AMOXICILLIN-CLAVULANATE (AUGMENTIN) 875-125 MG PER TABLET    Take 1 tablet by mouth every 12 (twelve) hours for 7 days       Start Date: 2/15/2023 End Date: 2/22/2023       Order Dose: 1 tablet       Quantity: 14 tablet    Refills: 0    BUDESONIDE (PULMICORT FLEXHALER) 180 MCG/ACT INHALER    Inhale 2 puffs 2 (two) times a day for 7 days Rinse mouth after use  Start Date: 2/15/2023 End Date: 2/22/2023       Order Dose: 2 puffs       Quantity: 1 each    Refills: 0       No discharge procedures on file      PDMP Review     None          ED Provider  Electronically Signed by           Iam Carpenter DO  02/15/23 1049

## 2023-02-15 NOTE — DISCHARGE INSTRUCTIONS
Increase fluids  Use Mucinex and Sudafed as discussed for symptom control  Use Augmentin 1 pill twice a day to treat infection  Take a yogurt or probiotic daily while on Augmentin  Use albuterol, 2 puffs or 1 nebulizer treatment every 6 hours for a week, whether you feel you needed or not  After a week, you can use it as needed  Use Pulmicort inhaler 2 puffs twice a day  Rinse your mouth out after using this medicine  Recheck with your family doctor in 2 to 4 days and return to the ER with new or concerning issues

## 2023-02-15 NOTE — PATIENT INSTRUCTIONS
You are being sent to the [facility] ED for higher level of care, eval and treatment  Your chest xray appears to show vascular congestion - you may need lasix treatment  Follow up with your PCP after the ED visit

## 2023-02-15 NOTE — PROGRESS NOTES
[address]        NAME: Bertha Berry is a 58 y o  male  : 1960    MRN: [id number]  DATE: February 15, 2023  TIME: 4:09 PM    Assessment and Plan   Pulmonary vascular congestion [R09 89]  1  Pulmonary vascular congestion  ECG 12 lead      2  Acute serous otitis media of left ear, recurrence not specified        3  Viral upper respiratory tract infection with cough        4  Adventitious breath sounds  XR chest pa & lateral      5  Shortness of breath  XR chest pa & lateral      6  Morbid obesity ([address] )        7  History of atrial fibrillation  ECG 12 lead            Patient Instructions       Follow up with PCP in 3-5 days  Proceed to  ER if symptoms worsen  You are being sent to the [facility] ED for higher level of care, eval and treatment  Your chest xray appears to show vascular congestion - you may need lasix treatment  Follow up with your PCP after the ED visit       Chief Complaint     Chief Complaint   Patient presents with   • Shortness of Breath         History of Present Illness       This is a 58year old male who states has been ill x 6 weeks with cough, congestion, left ear pain, wheezing, SOB, difficulty breathing  He has denied calling his doctor for this  He state that he had a cardiac ablation with no problems since hx of Afib, states on aspirin  He states that he is to take lasix on a prn basis for leg swelling  He states he felt that he hasn't had any leg swelling and that he hasn't taken his lasix for at least 6 weeks or more  He has been taking mucinex DM then this am switched to plain mucinex  Hx of LUNA, Coronary disease and venous insufficiency  Denies fevers, chills, n/v/   + diarrhea this am but is normal for pt  Review of Systems   Review of Systems   Constitutional: Negative  HENT: Positive for congestion and ear pain  Eyes: Negative  Respiratory: Positive for shortness of breath and wheezing  Cardiovascular: Negative      Gastrointestinal: Positive for diarrhea  Endocrine: Negative  Genitourinary: Negative  Musculoskeletal: Negative  Skin: Negative  Allergic/Immunologic: Negative  Neurological: Negative  Hematological: Negative  Psychiatric/Behavioral: Negative  Current Medications     No current facility-administered medications for this visit  Current Outpatient Medications:   •  albuterol (ProAir HFA) 90 mcg/act inhaler, Inhale 2 puffs every 6 (six) hours as needed for wheezing or shortness of breath, Disp: 8 5 g, Rfl: 0  •  ALPRAZolam (XANAX) 1 mg tablet, Take 1 mg by mouth daily at bedtime, Disp: , Rfl:   •  aspirin (ECOTRIN) 325 mg EC tablet, Take 325 mg by mouth, Disp: , Rfl:   •  atorvastatin (LIPITOR) 10 mg tablet, Take 1 tablet (10 mg total) by mouth daily, Disp: 30 tablet, Rfl: 5  •  busPIRone (BUSPAR) 5 mg tablet, take 2 tablets by oral route in the morning and 1 tablet in the evening , Disp: , Rfl:   •  famotidine (PEPCID) 20 mg tablet, Take 20 mg by mouth daily, Disp: , Rfl:   •  FLUoxetine (PROzac) 20 mg capsule, Take 40 mg by mouth daily In the morning, Disp: , Rfl:   •  metoprolol succinate (TOPROL-XL) 50 mg 24 hr tablet, TAKE 1 TABLET DAILY  , Disp: 30 tablet, Rfl: 5  •  omeprazole (PriLOSEC) 20 mg delayed release capsule, omeprazole 20 mg capsule,delayed release, Disp: , Rfl:   •  omeprazole (PriLOSEC) 20 mg delayed release capsule, , Disp: , Rfl:   •  pregabalin (LYRICA) 150 mg capsule, Take 75 mg by mouth 2 (two) times a day  , Disp: , Rfl:   •  traMADol (ULTRAM) 50 mg tablet, Take 50 mg by mouth every 6 (six) hours as needed, Disp: , Rfl:   •  zolpidem (AMBIEN) 5 mg tablet, Take 5 mg by mouth daily at bedtime as needed for sleep, Disp: , Rfl:   •  busPIRone (BUSPAR) 5 mg tablet, take 2 tablets in the morning and 1 tablet at night (Patient not taking: Reported on 5/25/2022), Disp: , Rfl:   •  furosemide (LASIX) 20 mg tablet, Take one two days per week for edema (Patient not taking: Reported on 2/15/2023), Disp: 15 tablet, Rfl: 5  •  methylPREDNISolone 4 MG tablet therapy pack, Use as directed on package (Patient not taking: Reported on 12/7/2022), Disp: 1 each, Rfl: 0  •  senna-docusate sodium (SENOKOT S) 8 6-50 mg per tablet, Take 1 tablet by mouth 2 (two) times a day as needed, Disp: , Rfl:   •  UNKNOWN TO PATIENT, , Disp: , Rfl:     Facility-Administered Medications Ordered in Other Visits:   •  dexamethasone (DECADRON) injection 8 mg, 8 mg, Intramuscular, Once, Jose Anderson DO  •  ipratropium-albuterol (DUO-NEB) 0 5-2 5 mg/3 mL inhalation solution 3 mL, 3 mL, Nebulization, Q6H, Sabrina Ling Jr, DO, 3 mL at 02/15/23 1411    Current Allergies     Allergies as of 02/15/2023 - Reviewed 02/15/2023   Allergen Reaction Noted   • Diphenhydramine Other (See Comments) and Palpitations 11/28/2012   • Prednisone Palpitations and Other (See Comments) 10/29/2019            The following portions of the patient's history were reviewed and updated as appropriate: allergies, current medications, past family history, past medical history, past social history, past surgical history and problem list      Past Medical History:   Diagnosis Date   • Anxiety    • Arthritis    • Athscl heart disease of native coronary artery w/o ang pctrs    • Atrial fibrillation ([facility] [address] ) 2012 and 2016   • CPAP (continuous positive airway pressure) dependence    • Diverticulitis    • Edema     Legs and hands   • Essential tremor    • Hard of hearing    • Hx of cardiovascular stress test 04/28/2016    EF0 50 (50%) normal LVSF  No evidence for prior ischemia or MI   • Hx of echocardiogram 05/18/2016    EF0 55 (55%) severely technically limited suboptimal study  No significant valvular insufficiency or stenosis   / EF0 60 (60%) Trace MR and TR 6/22/17  / EF0 65 (65%) technically difficult study  no hemodynamically significant valve disease identified   11/30/17     • Hypercholesteremia    • Hypertension    • Mixed hyperlipidemia • Morbid obesity ([facility] [address] )    • Obesity    • Panic attacks    • Rash     On extremeties and chest  Chronic, recurrent   • Sleep apnea    • Varicose veins of left lower extremity     Bilateral       Past Surgical History:   Procedure Laterality Date   • ABLATION RADIO FREQUENCY ATRIAL (MAZE/CRYOABLATION)  02/08/2018    Cryoablation lesion summary, 10 applications were delivered  • CARDIAC CATHETERIZATION  11/2012    No sig CAD  6/26/17   • COLONOSCOPY     • JOINT REPLACEMENT Bilateral     knees   • [place] ARTHRP KNE CONDYLE&PLATU MEDIAL&LAT COMPARTMENTS Bilateral 6/1/2016    Procedure: ARTHROPLASTY KNEE TOTAL BILATERAL;  Surgeon: Artie Fry MD;  Location: [facility];  Service: Orthopedics   • SPINE SURGERY  2019    ACDF C6-7       Family History   Problem Relation Age of Onset   • Coronary artery disease Family         less than 61 yrs of age   • Heart attack Mother         MI   • Heart attack Sister         MI   • Heart attack Brother         MI         Medications have been verified  Objective   /80   Pulse 70   Temp 98 4 °F (36 9 °C)   Resp (!) 24   Wt (!) 150 kg (330 lb)   SpO2 96%   BMI 51 69 kg/m²   No LMP for male patient  Physical Exam     Physical Exam  Vitals and nursing note reviewed  Constitutional:       General: He is not in acute distress  Appearance: He is well-developed  He is obese  He is not ill-appearing, toxic-appearing or diaphoretic  Comments: Not in acute distress however has upper airway wet wheezing  HENT:      Head: Normocephalic and atraumatic  Mouth/Throat:      Mouth: Mucous membranes are moist       Pharynx: Oropharynx is clear  Eyes:      Extraocular Movements: Extraocular movements intact  Pupils: Pupils are equal, round, and reactive to light  Cardiovascular:      Rate and Rhythm: Normal rate and regular rhythm  Comments: No JVD noted   Pulmonary:      Effort: Tachypnea present        Breath sounds: Examination of the right-upper field reveals wheezing and rales  Examination of the left-upper field reveals wheezing, rhonchi and rales  Examination of the right-middle field reveals wheezing, rhonchi and rales  Examination of the left-middle field reveals wheezing, rhonchi and rales  Examination of the right-lower field reveals wheezing, rhonchi and rales  Examination of the left-lower field reveals wheezing, rhonchi and rales  Wheezing, rhonchi and rales present  Musculoskeletal:      Cervical back: Normal range of motion and neck supple  Right lower le+ Pitting Edema present  Left lower le+ Pitting Edema present  Skin:     General: Skin is warm and dry  Capillary Refill: Capillary refill takes less than 2 seconds  Neurological:      General: No focal deficit present  Mental Status: He is alert and oriented to person, place, and time  Psychiatric:         Mood and Affect: Mood normal          Behavior: Behavior normal          preliminary reading CXR  + vascular congestion  Waiting on rad read       EKG NSR non specific ST abnormality  No stemi    ,        Pt being sent [facility] ED for higher level of care and evaluation  /80   Pulse 70   Temp 98 4 °F (36 9 °C)   Resp (!) 24   Wt (!) 150 kg (330 lb)   SpO2 96%   BMI 51 69 kg/m²      VSS although respers are slightly labored with wet upper   DDX:  Pneumonia, CHF, vascular congestion, fluid overload, viral vs bacterial bronchitis, viral URI, RSV, Covid  Pt agrees to go to the ED via EMS

## 2023-02-15 NOTE — ED NOTES
Aerosol delivery system/respironics  will send home w/ pt   Pt and family education given , verbalized understanding  Marie Elias  02/15/23 3673

## 2023-02-16 LAB
ATRIAL RATE: 64 BPM
ATRIAL RATE: 66 BPM
P AXIS: 12 DEGREES
P AXIS: 46 DEGREES
PR INTERVAL: 142 MS
PR INTERVAL: 146 MS
QRS AXIS: 27 DEGREES
QRS AXIS: 41 DEGREES
QRSD INTERVAL: 102 MS
QRSD INTERVAL: 104 MS
QT INTERVAL: 420 MS
QT INTERVAL: 430 MS
QTC INTERVAL: 433 MS
QTC INTERVAL: 450 MS
T WAVE AXIS: 62 DEGREES
T WAVE AXIS: 71 DEGREES
VENTRICULAR RATE: 64 BPM
VENTRICULAR RATE: 66 BPM

## 2023-03-17 NOTE — TELEPHONE ENCOUNTER
sw pt he stated he is having a lot of other health issues at this time he doesn't wish to schedule will call back if he changes his mind 165.1

## 2023-06-29 ENCOUNTER — HOSPITAL ENCOUNTER (OUTPATIENT)
Dept: RADIOLOGY | Facility: HOSPITAL | Age: 63
End: 2023-06-29
Attending: OTOLARYNGOLOGY
Payer: COMMERCIAL

## 2023-06-29 DIAGNOSIS — R13.14 PHARYNGOESOPHAGEAL DYSPHAGIA: ICD-10-CM

## 2023-06-29 PROCEDURE — 92611 MOTION FLUOROSCOPY/SWALLOW: CPT

## 2023-06-29 PROCEDURE — 74230 X-RAY XM SWLNG FUNCJ C+: CPT

## 2024-04-25 NOTE — RESPIRATORY THERAPY NOTE
Chief Complaint  Establish Care, Fatigue, Slow Heart Rate, Hypertension, and Dizziness      History of Present Illness  Gael Perales presents to [facility] CARDIOLOGY  Patient is a 57-year-old with a previous history of hypertension who after developing COVID and flu in February developed issues with sinus bradycardia with his heart rate in the 40s or 50s which did seem like he did not very much during the day the patient also had increased fatigability problems.  He was decreased and stopped on his metoprolol at that time and placed on Norvasc the patient has noted some dizziness spells intermittently as well 1 which occurred while he was standing up on a ladder.  He has not had any nargis syncope.  He has had longstanding exertional dyspnea symptoms which previously have been attributed to exercise-induced asthma.  He has not had increased PND orthopnea lower extremity edema.  Since stopping his metoprolol he is felt that his heartbeat has been more erratic and sometimes fast at times.      Past Medical History:   Diagnosis Date    Hypertension          Current Outpatient Medications:     albuterol sulfate  (90 Base) MCG/ACT inhaler, Inhale 2 puffs Every 4 (Four) Hours As Needed for Wheezing., Disp: , Rfl:     amLODIPine (NORVASC) 5 MG tablet, Take 1 tablet by mouth Daily., Disp: , Rfl:     aspirin 81 MG EC tablet, Take 1 tablet by mouth Daily., Disp: , Rfl:     busPIRone (BUSPAR) 15 MG tablet, Take 1 tablet by mouth Every 12 (Twelve) Hours., Disp: , Rfl:     fluticasone (FLONASE) 50 MCG/ACT nasal spray, 2 sprays into the nostril(s) as directed by provider Daily., Disp: , Rfl:     lamoTRIgine (LaMICtal) 25 MG tablet, Take 1 tablet by mouth Daily., Disp: , Rfl:     losartan (COZAAR) 100 MG tablet, Take 1 tablet by mouth Daily. for high blood pressure, Disp: , Rfl:     multivitamin with minerals (CENTRUM SILVER 50+MEN PO), Take 1 tablet by mouth Daily., Disp: , Rfl:     traZODone  RT Protocol Note  Dayna Olivarez 61 y o  male MRN: [id number]  Unit/Bed#: -02 Encounter: [id number]    Assessment    Active Problems:    Coronary arteriosclerosis in native artery    Sleep apnea    Obesity    Mixed hyperlipidemia    Atrial fibrillation ([facility] [address] )    Essential (primary) hypertension    Chest pain      Home Pulmonary Medications:  albuteral MDI Q6 hr PRN, not using       Past Medical History:   Diagnosis Date    Anxiety     Arthritis     Athscl heart disease of native coronary artery w/o ang pctrs     Atrial fibrillation ([facility] [address] ) 2012 and 2016    CPAP (continuous positive airway pressure) dependence     Diverticulitis     Edema     Legs and hands    Essential tremor     Hard of hearing     Hx of cardiovascular stress test 04/28/2016    EF0 50 (50%) normal LVSF  No evidence for prior ischemia or MI    Hx of echocardiogram 05/18/2016    EF0 55 (55%) severely technically limited suboptimal study  No significant valvular insufficiency or stenosis   / EF0 60 (60%) Trace MR and TR 6/22/17  / EF0 65 (65%) technically difficult study  no hemodynamically significant valve disease identified   11/30/17      Hypercholesteremia     Hypertension     Mixed hyperlipidemia     Morbid obesity (HCC)     Obesity     Panic attacks     Rash     On extremeties and chest  Chronic, recurrent    Sleep apnea     Varicose veins of left lower extremity     Bilateral     Social History     Socioeconomic History    Marital status: /Civil Union     Spouse name: None    Number of children: None    Years of education: None    Highest education level: None   Occupational History    None   Social Needs    Financial resource strain: None    Food insecurity:     Worry: None     Inability: None    Transportation needs:     Medical: None     Non-medical: None   Tobacco Use    Smoking status: Former Smoker     Packs/day: 1 00     Years: 18 00     Pack years: 18 00     Types: Cigarettes    Smokeless tobacco: "(DESYREL) 50 MG tablet, Take 1 tablet by mouth Daily., Disp: , Rfl:     There are no discontinued medications.  No Known Allergies     Social History     Tobacco Use    Smoking status: Never    Smokeless tobacco: Never       No family history on file.     Objective     /84   Pulse 67   Ht 71 cm (27.95\")   Wt 106 kg (233 lb)   .65 kg/m²       Physical Exam    General Appearance:   no acute distress  Alert and oriented x3  HENT:   lips not cyanotic  Atraumatic  Neck:  No jvd   supple  Respiratory:  no respiratory distress  normal breath sounds  no rales  Cardiovascular:  Regular rate and rhythm  no S3, no S4   no murmur  no rub  Extremities  No cyanosis  lower extremity edema: none    Skin:   warm, dry  No rashes    Result Review :     No results found for: \"PROBNP\"       No results found for: \"TSH\"   No results found for: \"FREET4\"   No results found for: \"DDIMERQUANT\"  No results found for: \"MG\"   No results found for: \"DIGOXIN\"   No results found for: \"TROPONINT\"   No results found for: \"POCTROP\"(                                  No results found for this or any previous visit.             The ASCVD Risk score (Ruel DK, et al., 2019) failed to calculate for the following reasons:    Cannot find a previous HDL lab    Cannot find a previous total cholesterol lab     Diagnoses and all orders for this visit:    1. Bradycardia (Primary)  Assessment & Plan:  Patient with baseline normal EKG it appears that his bradycardic issues are resolved after discontinuing his beta-blocker will get a 48-hour monitor to monitor for trends as well as an echocardiogram to make sure no structural issues unclear if some of these issues may have been just postviral in nature do not feel that his symptoms are primarily related to any rate related issues of bradycardia would avoid beta-blockers.  Will see what his heart rate trend looks like on the Holter monitor results    Orders:  -     Holter Monitor - 48 Hour; " Never Used    Tobacco comment: Quit 21 years ago   Substance and Sexual Activity    Alcohol use: Yes     Comment: socially    Drug use: No    Sexual activity: None   Lifestyle    Physical activity:     Days per week: None     Minutes per session: None    Stress: None   Relationships    Social connections:     Talks on phone: None     Gets together: None     Attends [employer] service: None     Active member of club or organization: None     Attends meetings of clubs or organizations: None     Relationship status: None    Intimate partner violence:     Fear of current or ex partner: None     Emotionally abused: None     Physically abused: None     Forced sexual activity: None   Other Topics Concern    None   Social History Narrative    None       Subjective         Objective    Physical Exam:        Vitals:  Blood pressure 153/78, pulse 62, temperature 98 5 °F (36 9 °C), resp  rate (!) 25, height 5' 8" (1 727 m), weight (!) 154 kg (340 lb), SpO2 98 %  Imaging and other studies: I have personally reviewed pertinent reports  Plan  Home therapy   RR elevated pt with #8 of 10 chest pain   SpO2 98% STEPHEN Albuteral MDI Q6 PRN Future  -     Adult Transthoracic Echo Complete W/ Cont if Necessary Per Protocol; Future    2. Dizziness  Assessment & Plan:  Patient with intermittent dizzy spells a lot of times occurring when he is standing today in the office he was orthostatic with a 22 mm drop in his systolic blood pressure and mildly dizzy in nature recommended that he titrate down his Norvasc to 2.5 a day and see if this improves the issues.  Would also encourage fluid hydration and consideration of compression stockings as ways to better combat dizziness spells as well.      3. Primary hypertension            Follow Up     No follow-ups on file.          Patient was given instructions and counseling regarding his condition or for health maintenance advice. Please see specific information pulled into the AVS if appropriate.
